# Patient Record
Sex: MALE | Race: WHITE | NOT HISPANIC OR LATINO | Employment: OTHER | URBAN - METROPOLITAN AREA
[De-identification: names, ages, dates, MRNs, and addresses within clinical notes are randomized per-mention and may not be internally consistent; named-entity substitution may affect disease eponyms.]

---

## 2017-07-19 ENCOUNTER — ALLSCRIPTS OFFICE VISIT (OUTPATIENT)
Dept: OTHER | Facility: OTHER | Age: 67
End: 2017-07-19

## 2017-08-31 ENCOUNTER — ALLSCRIPTS OFFICE VISIT (OUTPATIENT)
Dept: OTHER | Facility: OTHER | Age: 67
End: 2017-08-31

## 2017-10-05 ENCOUNTER — ALLSCRIPTS OFFICE VISIT (OUTPATIENT)
Dept: OTHER | Facility: OTHER | Age: 67
End: 2017-10-05

## 2017-10-30 ENCOUNTER — GENERIC CONVERSION - ENCOUNTER (OUTPATIENT)
Dept: OTHER | Facility: OTHER | Age: 67
End: 2017-10-30

## 2017-11-09 ENCOUNTER — ALLSCRIPTS OFFICE VISIT (OUTPATIENT)
Dept: OTHER | Facility: OTHER | Age: 67
End: 2017-11-09

## 2017-12-12 ENCOUNTER — GENERIC CONVERSION - ENCOUNTER (OUTPATIENT)
Dept: OTHER | Facility: OTHER | Age: 67
End: 2017-12-12

## 2017-12-18 ENCOUNTER — ALLSCRIPTS OFFICE VISIT (OUTPATIENT)
Dept: OTHER | Facility: OTHER | Age: 67
End: 2017-12-18

## 2018-01-11 NOTE — PROGRESS NOTES
Active Problems    1  Urgency of urination (788 63) (R39 15)   2  Urinary frequency (788 41) (R35 0)    Current Meds   1  RA Vitamin D-3 1000 UNIT Oral Tablet; Therapy: (Recorded:07Ifw9212) to Recorded   2  Simvastatin 40 MG Oral Tablet; Therapy: (Recorded:23Sec0384) to Recorded   3  Tylenol 325 MG Oral Tablet; Therapy: (Recorded:25Aor3101) to Recorded   4  Vitamin B-12 100 MCG Oral Tablet; Therapy: (Recorded:00Bkf3381) to Recorded    Allergies    1  oxybutynin   2  Toviaz TB24   3  VESIcare TABS    Procedure    Procedure: Percutaneous Tibial Nerve Stimulation (PTNS)   Date onset of symptoms MORE THAN 2 YEARS AGO  Behavior modification: DIDN'T WORK  Biofeedback: DIDN'T WORK AND STILL DOING Dulce Fowler  No E-Stim  Drug 1: OXYBUTYNIN, VESICARE, [address] [facility], 1965 [place] DIDN'T WORK  Session number: 1 month f/u     Patient Goals and Progress   Decreased urgency  Decreased frequency  Sleeps through the night  Caffeine - cups per day: 0-1  Alcohol - number of drinks per day: 0  Daytime voids - number per day:     Nighttime voids - number per night: 10-16  Urgency: 1-2  Incontinence - episodes per day: 0  Treatment Plan   Do not increase fluids  Decrease fluids  Decrease caffeine  Urge reduction techniques: Tanika Shields:   Toilet every 3-4 hours  No fluids before bed  Ankle Used: Left     Treatment settin  Duration of treatment: 30 MINUTES  Lead lot #: [id number]  Expiration Date: 2019  Feeling/Response: Toe flex and foot sensation      Assessment    1  Urgency of urination (788 63) (R39 15)   2  Urinary frequency (788 41) (R35 0)    Plan  Urgency of urination, Urinary frequency    · Follow-up visit in 1 month Evaluation and Treatment  Follow-up  Status: Hold For -  Scheduling,Retrospective Authorization  Requested for: [id number]   Ordered; For: Urgency of urination, Urinary frequency;  Ordered By: Won Ayala Performed:  Due: 82FSN8233; Last Updated By: Vesna Martinez; 8/31/2017 11:14:19 AM    Future Appointments    Date/Time Provider Specialty Site   12/12/2017 11:00 AM Lidia Staples MD Urology [address]     Signatures   Electronically signed by : Devonte Walker RN; Aug 31 2017 11:14AM EST                       (Author)    Electronically signed by : Alfred Freed MD; Sep  1 2017  8:45AM EST

## 2018-01-16 NOTE — PROGRESS NOTES
Active Problems    1  Urgency of urination (788 63) (R39 15)   2  Urinary frequency (788 41) (R35 0)    Current Meds   1  RA Vitamin D-3 1000 UNIT Oral Tablet; Therapy: (Recorded:96Jmw2810) to Recorded   2  Simvastatin 40 MG Oral Tablet; Therapy: (Recorded:93Inr8378) to Recorded   3  Tylenol 325 MG Oral Tablet; Therapy: (Recorded:05Vor1937) to Recorded   4  Vitamin B-12 100 MCG Oral Tablet; Therapy: (Recorded:59Rwe6122) to Recorded    Allergies    1  oxybutynin   2  Toviaz TB24   3  VESIcare TABS    Procedure    Procedure: Percutaneous Tibial Nerve Stimulation (PTNS)   Date onset of symptoms MORE THAN 2 YEARS AGO  Behavior modification: DIDN'T WORK  Biofeedback: DIDN'T WORK  STILL DOING Rosa M Suarez  No E-Stim  Drug 1: OXYBUTYNIN, V[facility]ICARE, [address], 1965 [place] DIDN'T WORK  Session number: 1 month f/u     Patient Goals and Progress   Decreased urgency  Decreased frequency  No accidents  Sleeps through the night  No related health and social factors  Caffeine - cups per day: 1  Alcohol - number of drinks per day: 0  Daytime voids - number per day: 10-12  Nighttime voids - number per night: 0-1  Urgency: 1-2  Incontinence - episodes per day: 0  Treatment Plan   Do not increase fluids  Decrease fluids  Decrease caffeine  Urge reduction techniques: Jerry Bame:   Toilet every 3-4 hours  No fluids before bed  Ankle Used: Left     Treatment settin  Duration of treatment: 30 MINUTES  Lead lot #: [id number]  Expiration Date: 2019  Feeling/Response: Toe flex and foot sensation      Assessment    1  Urge incontinence (788 31) (N39 41)   2  Urgency of urination (788 63) (R39 15)   3  Urinary frequency (788 41) (R35 0)    Plan  Urgency of urination, Urinary frequency    · Follow-up visit in 1 month Evaluation and Treatment  Follow-up  Status: Hold For -  Scheduling,Retrospective Authorization  Requested for: [id number]   Ordered;  For: Urgency of urination, Urinary frequency;  Ordered By: Lisa Rizvi Performed:  Due: 81SFA2418; Last Updated By: Sydnie Bro; 10/5/2017 11:36:05 AM    Future Appointments    Date/Time Provider Specialty Site   12/12/2017 11:00 AM Lisa Rizvi MD Urology [address]     Signatures   Electronically signed by : Airam Gordon RN; Oct  5 2017 11:36AM EST                       (Author)    Electronically signed by : Tamara Tracy MD; Oct  6 2017 12:56PM EST

## 2018-01-17 NOTE — PROGRESS NOTES
Active Problems    1  Cellulitis of scrotum (608 4) (N49 2)   2  Urgency of urination (788 63) (R39 15)   3  Urinary frequency (788 41) (R35 0)    Current Meds   1  Atorvastatin Calcium 20 MG Oral Tablet; Therapy: (Recorded:2017) to Recorded   2  Cephalexin 500 MG Oral Capsule; take 1 capsule 4 times daily; Therapy: 52ETD1529 to (Evaluate:2017)  Requested for: 03HBB3211; Last   Rx:2017; Status: ACTIVE - Transmit to Pharmacy - Awaiting Verification Ordered   3  Gabapentin 100 MG Oral Capsule; Therapy: (Recorded:2017) to Recorded   4  Percocet  MG TABS; Therapy: (Recorded:2017) to Recorded   5  RA Vitamin D-3 1000 UNIT Oral Tablet; Therapy: (Recorded:2017) to Recorded   6  Tylenol 325 MG Oral Tablet; Therapy: (Recorded:2017) to Recorded   7  Vitamin B-12 100 MCG Oral Tablet; Therapy: (Recorded:2017) to Recorded    Allergies    1  oxybutynin   2  Toviaz TB24   3  VESIcare TABS    Procedure    Procedure: Percutaneous Tibial Nerve Stimulation (PTNS)   Date onset of symptoms MORE THAN 2 YEARS AGO  Behavior modification: DIDN'T WORK  Biofeedback: DIDN'T WORK AND STILL DOING Clifm Gianfranco  No E-Stim  Drug 1: OXYBUTYNIN, V[facility]ICARE, [address] [facility], [address] [place] [address] DIDN'T WORK  Session number: 1 month f/u     Patient Goals and Progress   Decreased urgency  Decreased frequency  Sleeps through the night  No related health and social factors  Caffeine - cups per day: 1  Alcohol - number of drinks per day: 0  Daytime voids - number per day: 10-14  Nighttime voids - number per night: 0  Urgency: 1-2  Incontinence - episodes per day: 0  Treatment Plan   Do not increase fluids  Decrease fluids  Urge reduction techniques: Manfred Hashimoto:   Toilet every 3-4 hours  No fluids before bed  Ankle Used: Left     Treatment settin  Duration of treatment: 30 MINUTES  Lead lot #: [id number]  Expiration Date: 2019       Feeling/Response: Toe flex and foot sensation      Assessment    1  Urinary frequency (788 41) (R35 0)   2  Urinary urgency (788 63) (R39 15)    Plan  Urinary frequency, Urinary urgency    · Follow-up visit in 1 month Evaluation and Treatment  Follow-up  Status: Hold For -  Scheduling,Retrospective Authorization  Requested for: [id number]   Ordered; For: Urinary frequency, Urinary urgency;  Ordered By: Mercedes Farmer Performed:  Due: 79YUA8759; Last Updated By: John Barkley; 11/9/2017 10:44:29 AM    Future Appointments    Date/Time Provider Specialty Site   12/12/2017 11:00 AM Mercedes Farmer MD Urology [address] [facility]     Signatures   Electronically signed by : Jameel Vaca RN; Nov 9 2017 10:44AM EST                       (Author)    Electronically signed by : Swati Gaston MD; Nov 10 2017  9:04AM EST

## 2018-01-22 VITALS
WEIGHT: 283 LBS | DIASTOLIC BLOOD PRESSURE: 80 MMHG | SYSTOLIC BLOOD PRESSURE: 134 MMHG | HEIGHT: 71 IN | BODY MASS INDEX: 39.62 KG/M2

## 2018-01-24 VITALS
DIASTOLIC BLOOD PRESSURE: 88 MMHG | WEIGHT: 279.56 LBS | HEIGHT: 70 IN | BODY MASS INDEX: 40.02 KG/M2 | SYSTOLIC BLOOD PRESSURE: 166 MMHG

## 2018-02-20 ENCOUNTER — PROCEDURE VISIT (OUTPATIENT)
Dept: UROLOGY | Facility: MEDICAL CENTER | Age: 68
End: 2018-02-20
Payer: MEDICARE

## 2018-02-20 DIAGNOSIS — R35.0 URINARY FREQUENCY: ICD-10-CM

## 2018-02-20 DIAGNOSIS — R39.15 URGENCY OF URINATION: ICD-10-CM

## 2018-02-20 PROCEDURE — 64566 NEUROELTRD STIM POST TIBIAL: CPT

## 2018-02-20 RX ORDER — ACETAMINOPHEN 325 MG/1
650 TABLET ORAL EVERY 6 HOURS
COMMUNITY
End: 2018-06-28

## 2018-02-20 RX ORDER — GABAPENTIN 100 MG/1
CAPSULE ORAL
COMMUNITY
End: 2018-06-28

## 2018-02-20 RX ORDER — LISINOPRIL 40 MG/1
20 TABLET ORAL
COMMUNITY
Start: 2016-02-22 | End: 2019-03-20 | Stop reason: SDUPTHER

## 2018-02-20 RX ORDER — AMOXICILLIN 500 MG/1
2000 TABLET, FILM COATED ORAL
COMMUNITY
Start: 2016-01-26 | End: 2018-06-28

## 2018-02-20 RX ORDER — CYCLOBENZAPRINE HCL 10 MG
10 TABLET ORAL 3 TIMES DAILY
COMMUNITY
Start: 2016-05-03 | End: 2018-06-28

## 2018-02-20 RX ORDER — SIMVASTATIN 40 MG
40 TABLET ORAL
COMMUNITY
Start: 2012-10-05 | End: 2018-06-28

## 2018-02-20 RX ORDER — NABUMETONE 750 MG/1
750 TABLET, FILM COATED ORAL
COMMUNITY
End: 2018-06-28

## 2018-02-20 RX ORDER — UBIDECARENONE 75 MG
CAPSULE ORAL
COMMUNITY
End: 2018-06-28

## 2018-02-20 RX ORDER — ATORVASTATIN CALCIUM 20 MG/1
TABLET, FILM COATED ORAL
COMMUNITY
End: 2018-10-30

## 2018-02-20 RX ORDER — CEPHALEXIN 500 MG/1
1 CAPSULE ORAL 4 TIMES DAILY
COMMUNITY
Start: 2017-10-30 | End: 2018-06-28

## 2018-02-20 NOTE — PROGRESS NOTES
Procedure: Percutaneous Tibial Nerve Stimulation (PTNS)   Date onset of symptoms A LONG TIME AGO  Behavior modification: DIDN'T WORK  Biofeedback: STILL DOING KEGELS  E-Stim: NO  Drugs:  OXYBUTYNIN, VESICARE, MYRBETRIQ, AND TOVIAZ DIDN;T WORK  Other: NO  Session number: ONE MONTH F/U     Patient Goals and Progress   Decreased urgency: YES   Decreased frequency: YES  No accidents: NO  Sleeps through the night: YES  No related health and social factors: NO     Caffeine - cups per day: 1  Alcohol - number of drinks per day: 0    Daytime voids - number per day: 10-14  Nighttime voids - number per night: 0-1  Urgency: 1-2  Incontinence - episodes per day: 0      Treatment Plan   Increase fluids: NO   Decrease fluids: YES   Decrease caffeine: YES  Urge reduction techniques: YES  Kegels: YES   Toilet every 3-4 hours     No fluids before bed YES     Ankle Used: LEFT     Treatment settin  Duration of treatment: 30 MINUTES  Lead lot #: [id number]  Expiration Date: 2020       Feeling/Response:  Toe flex and foot sensation

## 2018-04-23 ENCOUNTER — PROCEDURE VISIT (OUTPATIENT)
Dept: UROLOGY | Facility: MEDICAL CENTER | Age: 68
End: 2018-04-23

## 2018-04-23 ENCOUNTER — TELEPHONE (OUTPATIENT)
Dept: UROLOGY | Facility: AMBULATORY SURGERY CENTER | Age: 68
End: 2018-04-23

## 2018-04-23 DIAGNOSIS — R39.15 URGENCY OF URINATION: Primary | ICD-10-CM

## 2018-04-23 DIAGNOSIS — R35.0 URINARY FREQUENCY: ICD-10-CM

## 2018-04-23 NOTE — TELEPHONE ENCOUNTER
Spoke with pt who said he called [employer] to see if they will cover PTNS txs after 3 years time  He was told if doctor calls [facility] Provider Hotline and tells them PTNS is medically necessary, they will cover it  He was not so sure the agent he was talking to was reliable  Told him we will research it

## 2018-04-23 NOTE — PROGRESS NOTES
Procedure: Percutaneous Tibial Nerve Stimulation (PTNS)   Date onset of symptoms A LONG TIME AGO  Behavior modification: DIDN'T WORK  Biofeedback: STILL USING KEGELS  E-Stim: NO  Drugs: OXYBUTYNIN, VESICARE, MYRBETRIQ, AND TOVIAZ DIDN'T WORK   Other: NONE  Session number: ONE MONTH F/U     Patient Goals and Progress   Decreased urgency: YES   Decreased frequency: YES  No accidents: YES  Sleeps through the night: YES  No related health and social factors: NO     Caffeine - cups per day: 1       Alcohol - number of drinks per day: 0  Daytime voids - number per day: 10-13  Nighttime voids - number per night: 0  Urgency: 1-2  Incontinence - episodes per day: 0     Treatment Plan   Increase fluids: NO   Decrease fluids: YES  Decrease caffeine: YES   Urge reduction techniques: YES  Kegels: YES   Toilet every 3-4 hours     No fluids before bed YES     Ankle Used: LEFT     Treatment settin   Duration of treatment: 30 MINUTES  Lead lot #: [id number]  Expiration Date: 2020       Feeling/Response:  Toe flex and foot sensation

## 2018-04-23 NOTE — PROGRESS NOTES
I have reviewed the notes, assessments, and/or procedures performed , I concur with her/his documentation of Otis Fregoso

## 2018-06-27 NOTE — TELEPHONE ENCOUNTER
Pt called requesting RX refill for Gabapentin 100 MG capsules  Currently taking 3-4 pills per day  Needs a 3 month refill sent to the [facility] pharmacy on [address]   Their number is [phone]

## 2018-06-28 ENCOUNTER — OFFICE VISIT (OUTPATIENT)
Dept: FAMILY MEDICINE CLINIC | Facility: CLINIC | Age: 68
End: 2018-06-28
Payer: MEDICARE

## 2018-06-28 VITALS
SYSTOLIC BLOOD PRESSURE: 140 MMHG | WEIGHT: 278 LBS | BODY MASS INDEX: 39.8 KG/M2 | HEIGHT: 70 IN | RESPIRATION RATE: 18 BRPM | OXYGEN SATURATION: 97 % | TEMPERATURE: 97.6 F | DIASTOLIC BLOOD PRESSURE: 82 MMHG | HEART RATE: 92 BPM

## 2018-06-28 DIAGNOSIS — R73.09 ABNORMAL GLUCOSE: ICD-10-CM

## 2018-06-28 DIAGNOSIS — E78.2 MIXED HYPERLIPIDEMIA: ICD-10-CM

## 2018-06-28 DIAGNOSIS — G62.9 NEUROPATHY: Primary | ICD-10-CM

## 2018-06-28 DIAGNOSIS — M10.342 ACUTE GOUT DUE TO RENAL IMPAIRMENT INVOLVING LEFT HAND: ICD-10-CM

## 2018-06-28 DIAGNOSIS — E03.9 HYPOTHYROIDISM, UNSPECIFIED TYPE: ICD-10-CM

## 2018-06-28 PROBLEM — R39.15 URINARY URGENCY: Status: ACTIVE | Noted: 2017-07-19

## 2018-06-28 PROCEDURE — 99214 OFFICE O/P EST MOD 30 MIN: CPT | Performed by: INTERNAL MEDICINE

## 2018-06-28 RX ORDER — ACETAMINOPHEN 500 MG
TABLET ORAL EVERY 24 HOURS
COMMUNITY
Start: 2018-05-01 | End: 2018-10-30

## 2018-06-28 RX ORDER — GABAPENTIN 100 MG/1
CAPSULE ORAL
Qty: 360 CAPSULE | Refills: 3 | Status: SHIPPED | OUTPATIENT
Start: 2018-06-28 | End: 2020-03-16 | Stop reason: SDUPTHER

## 2018-06-28 RX ORDER — METHYLPREDNISOLONE 4 MG/1
TABLET ORAL
Qty: 21 TABLET | Refills: 0 | Status: SHIPPED | OUTPATIENT
Start: 2018-06-28 | End: 2018-10-30 | Stop reason: ALTCHOICE

## 2018-06-28 RX ORDER — AMOXICILLIN 500 MG/1
CAPSULE ORAL
COMMUNITY
Start: 2018-04-12

## 2018-06-28 RX ORDER — GABAPENTIN 100 MG/1
CAPSULE ORAL
COMMUNITY
Start: 2017-06-23 | End: 2018-06-28 | Stop reason: SDUPTHER

## 2018-06-28 RX ORDER — OXYCODONE HYDROCHLORIDE AND ACETAMINOPHEN 5; 325 MG/1; MG/1
TABLET ORAL
Qty: 30 TABLET | Refills: 0 | Status: SHIPPED | OUTPATIENT
Start: 2018-06-28 | End: 2018-10-04 | Stop reason: SDUPTHER

## 2018-06-28 RX ORDER — ATORVASTATIN CALCIUM 20 MG/1
20 TABLET, FILM COATED ORAL
COMMUNITY
End: 2018-06-28

## 2018-06-28 RX ORDER — LISINOPRIL 40 MG/1
TABLET ORAL EVERY 24 HOURS
COMMUNITY
Start: 2018-01-11 | End: 2018-06-28

## 2018-06-28 RX ORDER — OXYCODONE HYDROCHLORIDE AND ACETAMINOPHEN 5; 325 MG/1; MG/1
TABLET ORAL
COMMUNITY
Start: 2018-02-15 | End: 2018-06-28

## 2018-06-28 RX ORDER — GABAPENTIN 100 MG/1
100 CAPSULE ORAL
Qty: 120 CAPSULE | Refills: 11 | OUTPATIENT
Start: 2018-06-28

## 2018-06-28 NOTE — PROGRESS NOTES
Assessment/Plan:    No problem-specific Assessment & Plan notes found for this encounter  Problem List Items Addressed This Visit     None            Subjective:      Patient ID: Ivan Altman is a 76 y o  male  HPI    The following portions of the patient's history were reviewed and updated as appropriate: allergies, current medications, past family history, past medical history, past social history, past surgical history and problem list     Review of Systems   Constitutional: Negative for appetite change, fatigue, fever and unexpected weight change  HENT: Negative for rhinorrhea, sinus pain, sinus pressure, sneezing and sore throat  Eyes: Negative for visual disturbance  Respiratory: Negative for cough, chest tightness, shortness of breath and wheezing  Cardiovascular: Negative for chest pain, palpitations and leg swelling  Gastrointestinal: Negative for abdominal distention, abdominal pain, blood in stool, constipation, diarrhea, nausea and vomiting  Endocrine: Negative for polydipsia and polyuria  Genitourinary: Negative for decreased urine volume, difficulty urinating, dysuria, hematuria and urgency  Musculoskeletal: Negative for arthralgias, back pain, joint swelling and neck pain  Skin: Negative for rash  Allergic/Immunologic: Negative for environmental allergies  Neurological: Negative for tremors, weakness, light-headedness, numbness and headaches  Hematological: Does not bruise/bleed easily  Psychiatric/Behavioral: Negative for agitation, behavioral problems, confusion and dysphoric mood  The patient is not nervous/anxious  Objective:      /82 (BP Location: Left arm, Patient Position: Sitting, Cuff Size: Large)   Pulse 92   Temp 97 6 °F (36 4 °C)   Resp 18   Ht 5' 10" (1 778 m)   Wt 126 kg (278 lb)   SpO2 97%   BMI 39 89 kg/m²          Physical Exam   Constitutional: He is oriented to person, place, and time   He appears well-developed and well-nourished  No distress  HENT:   Head: Normocephalic and atraumatic  Nose: Nose normal    Mouth/Throat: Oropharynx is clear and moist  No oropharyngeal exudate  Eyes: Conjunctivae and EOM are normal  Pupils are equal, round, and reactive to light  No scleral icterus  Neck: Normal range of motion  Neck supple  No JVD present  No tracheal deviation present  No thyromegaly present  Cardiovascular: Normal rate, regular rhythm and normal heart sounds  Exam reveals no gallop and no friction rub  No murmur heard  Pulmonary/Chest: Effort normal  No respiratory distress  He has no wheezes  He has no rales  He exhibits no tenderness  Abdominal: Soft  Bowel sounds are normal  He exhibits no distension and no mass  There is no tenderness  There is no rebound and no guarding  Musculoskeletal: Normal range of motion  He exhibits no edema or deformity  Lymphadenopathy:     He has no cervical adenopathy  Neurological: He is alert and oriented to person, place, and time  No cranial nerve deficit  Coordination normal    Skin: Skin is warm and dry  No rash noted  Psychiatric: He has a normal mood and affect   His behavior is normal  Judgment and thought content normal

## 2018-07-12 ENCOUNTER — PROCEDURE VISIT (OUTPATIENT)
Dept: UROLOGY | Facility: MEDICAL CENTER | Age: 68
End: 2018-07-12
Payer: MEDICARE

## 2018-07-12 DIAGNOSIS — R39.15 URGENCY OF URINATION: ICD-10-CM

## 2018-07-12 DIAGNOSIS — R35.0 URINARY FREQUENCY: ICD-10-CM

## 2018-07-12 DIAGNOSIS — N39.41 URGE INCONTINENCE: ICD-10-CM

## 2018-07-12 PROCEDURE — 64566 NEUROELTRD STIM POST TIBIAL: CPT

## 2018-07-12 NOTE — PROGRESS NOTES
I have reviewed the notes, assessments, and/or procedures performed , I concur with her/his documentation of Natalio Moseley

## 2018-07-12 NOTE — PROGRESS NOTES
Procedure: Percutaneous Tibial Nerve Stimulation (PTNS)   Date onset of symptoms MORE THAN 2 YEARS AGO  Behavior modification: DIDN'T WORK  Biofeedback: DIDN'T WORK  STILL USING [address]  E-Stim: NO  Drugs: OXYBUTYNIN, VESICARE, MYRBETRIQ, AND TOVIAZ DIDN'T WORK   Other: NONE  Session number: 3 MONTH F/U     Patient Goals and Progress   Decreased urgency: YES  Decreased frequency: YES  No accidents: NO  Sleeps through the night: YES  No related health and social factors: NO     Caffeine - cups per day: 1  Alcohol - number of drinks per day: 0    Daytime voids - number per day:5-9   Nighttime voids - number per night: 0-3   Urgency: 1-2  Incontinence - episodes per day: 0     Treatment Plan   Increase fluids: NO   Decrease fluids: YES   Decrease caffeine: YES  Urge reduction techniques: YES   Kegels: YES   Toilet every 3-4 hours     No fluids before bed YES     Ankle Used: RIGHT     Treatment settin  Duration of treatment: 30 MINUTES  Lead lot #: [id number]  Expiration Date: 10-       Feeling/Response:  Toe flex and foot sensation

## 2018-07-30 ENCOUNTER — CLINICAL SUPPORT (OUTPATIENT)
Dept: FAMILY MEDICINE CLINIC | Facility: CLINIC | Age: 68
End: 2018-07-30
Payer: MEDICARE

## 2018-07-30 DIAGNOSIS — M17.10 ARTHROPATHY OF KNEE: ICD-10-CM

## 2018-07-30 DIAGNOSIS — E11.8 TYPE 2 DIABETES MELLITUS WITH COMPLICATION, WITHOUT LONG-TERM CURRENT USE OF INSULIN (HCC): ICD-10-CM

## 2018-07-30 DIAGNOSIS — E78.5 HYPERLIPIDEMIA, UNSPECIFIED HYPERLIPIDEMIA TYPE: ICD-10-CM

## 2018-07-30 DIAGNOSIS — E78.2 MIXED HYPERLIPIDEMIA: ICD-10-CM

## 2018-07-30 DIAGNOSIS — M1A.00X0 IDIOPATHIC CHRONIC GOUT WITHOUT TOPHUS, UNSPECIFIED SITE: ICD-10-CM

## 2018-07-30 DIAGNOSIS — E03.9 HYPOTHYROIDISM, UNSPECIFIED TYPE: Primary | ICD-10-CM

## 2018-07-30 DIAGNOSIS — I10 BENIGN ESSENTIAL HYPERTENSION: ICD-10-CM

## 2018-07-30 LAB
BASOPHILS # BLD AUTO: 0.03 THOUSANDS/ΜL (ref 0–0.1)
BASOPHILS NFR BLD AUTO: 0 % (ref 0–1)
EOSINOPHIL # BLD AUTO: 0.25 THOUSAND/ΜL (ref 0–0.61)
EOSINOPHIL NFR BLD AUTO: 3 % (ref 0–6)
ERYTHROCYTE [DISTWIDTH] IN BLOOD BY AUTOMATED COUNT: 14 % (ref 11.6–15.1)
HCT VFR BLD AUTO: 45.1 % (ref 36.5–49.3)
HGB BLD-MCNC: 14.3 G/DL (ref 12–17)
IMM GRANULOCYTES # BLD AUTO: 0.03 THOUSAND/UL (ref 0–0.2)
IMM GRANULOCYTES NFR BLD AUTO: 0 % (ref 0–2)
LYMPHOCYTES # BLD AUTO: 2.07 THOUSANDS/ΜL (ref 0.6–4.47)
LYMPHOCYTES NFR BLD AUTO: 26 % (ref 14–44)
MCH RBC QN AUTO: 30.7 PG (ref 26.8–34.3)
MCHC RBC AUTO-ENTMCNC: 31.7 G/DL (ref 31.4–37.4)
MCV RBC AUTO: 97 FL (ref 82–98)
MONOCYTES # BLD AUTO: 0.7 THOUSAND/ΜL (ref 0.17–1.22)
MONOCYTES NFR BLD AUTO: 9 % (ref 4–12)
NEUTROPHILS # BLD AUTO: 4.79 THOUSANDS/ΜL (ref 1.85–7.62)
NEUTS SEG NFR BLD AUTO: 62 % (ref 43–75)
NRBC BLD AUTO-RTO: 0 /100 WBCS
PLATELET # BLD AUTO: 183 THOUSANDS/UL (ref 149–390)
PMV BLD AUTO: 10.3 FL (ref 8.9–12.7)
RBC # BLD AUTO: 4.66 MILLION/UL (ref 3.88–5.62)
TSH SERPL DL<=0.05 MIU/L-ACNC: 1.57 UIU/ML
WBC # BLD AUTO: 7.87 THOUSAND/UL (ref 4.31–10.16)

## 2018-07-30 PROCEDURE — 85025 COMPLETE CBC W/AUTO DIFF WBC: CPT | Performed by: SPECIALIST

## 2018-07-30 PROCEDURE — 36415 COLL VENOUS BLD VENIPUNCTURE: CPT

## 2018-07-30 PROCEDURE — 84443 ASSAY THYROID STIM HORMONE: CPT | Performed by: SPECIALIST

## 2018-08-07 ENCOUNTER — OFFICE VISIT (OUTPATIENT)
Dept: FAMILY MEDICINE CLINIC | Facility: CLINIC | Age: 68
End: 2018-08-07
Payer: MEDICARE

## 2018-08-07 VITALS
BODY MASS INDEX: 38.88 KG/M2 | SYSTOLIC BLOOD PRESSURE: 128 MMHG | DIASTOLIC BLOOD PRESSURE: 64 MMHG | WEIGHT: 271 LBS | HEART RATE: 88 BPM

## 2018-08-07 DIAGNOSIS — M10.032 ACUTE IDIOPATHIC GOUT OF LEFT WRIST: ICD-10-CM

## 2018-08-07 DIAGNOSIS — M48.062 SPINAL STENOSIS OF LUMBAR REGION WITH NEUROGENIC CLAUDICATION: ICD-10-CM

## 2018-08-07 DIAGNOSIS — E78.2 MIXED HYPERLIPIDEMIA: ICD-10-CM

## 2018-08-07 DIAGNOSIS — I10 HYPERTENSION, UNSPECIFIED TYPE: Primary | ICD-10-CM

## 2018-08-07 DIAGNOSIS — G89.4 CHRONIC PAIN DISORDER: ICD-10-CM

## 2018-08-07 DIAGNOSIS — R73.9 HYPERGLYCEMIA: ICD-10-CM

## 2018-08-07 DIAGNOSIS — M54.12 CERVICAL RADICULAR PAIN: ICD-10-CM

## 2018-08-07 DIAGNOSIS — E55.9 VITAMIN D DEFICIENCY: ICD-10-CM

## 2018-08-07 DIAGNOSIS — G60.9 IDIOPATHIC PERIPHERAL NEUROPATHY: ICD-10-CM

## 2018-08-07 PROCEDURE — 99214 OFFICE O/P EST MOD 30 MIN: CPT | Performed by: INTERNAL MEDICINE

## 2018-08-07 NOTE — PROGRESS NOTES
Assessment/Plan:    Patient will decrease it is KENNA and 10 by clipping the top of the capsule and decreasing for slowly from 200 milligrams HS to 100 milligrams HS as tolerated  Labs as ordered below  Diet reviewed  Lifestyle modifications reviewed  Medications reviewed and ordered  Laboratory tests and studies reviewed and ordered  All patient's questions answered to patient satisfaction  Diagnoses and all orders for this visit:    Hypertension, unspecified type  -     Hemoglobin A1C; Future  -     LDL cholesterol, direct; Future  -     Uric acid; Future  -     Comprehensive metabolic panel; Future    Mixed hyperlipidemia  -     Hemoglobin A1C; Future  -     LDL cholesterol, direct; Future  -     Uric acid; Future  -     Comprehensive metabolic panel; Future    Vitamin D deficiency    Chronic pain disorder    Cervical radicular pain    Spinal stenosis of lumbar region with neurogenic claudication    Idiopathic peripheral neuropathy  -     Hemoglobin A1C; Future  -     LDL cholesterol, direct; Future  -     Uric acid; Future  -     Comprehensive metabolic panel; Future    Acute idiopathic gout of left wrist  -     Hemoglobin A1C; Future  -     LDL cholesterol, direct; Future  -     Uric acid; Future  -     Comprehensive metabolic panel; Future    Hyperglycemia  -     Hemoglobin A1C; Future  -     LDL cholesterol, direct; Future  -     Uric acid; Future  -     Comprehensive metabolic panel; Future        Subjective:      Patient ID: Jaime Fatima is a 76 y o  male  HPI  This 27-year-old male has a history of    Essential hypertension well controlled    Mixed hyperlipidemia controlled    Recent episode of acute gouty arthritis of the left wrist but more so of the 1st metacarpophalangeal joint has not recurred    Uric acid level was not successfully sent from the lab and will be need need to be repeated    Chronic pain disorder patient rarely takes Percocet and had a good summer is for control of his pain is concerned he was able to decrease his gabapentin to 200 before sleep and still drowsy in the morning and would like to decrease it further we discussed the rationale for for slowly tapering by cutting the top of the pill off correction capsule  Current Outpatient Prescriptions:     acetaminophen (TYLENOL) 500 mg tablet, every 24 hours, Disp: , Rfl:     amoxicillin (AMOXIL) 500 mg capsule, Only dental work, Disp: , Rfl:     atorvastatin (LIPITOR) 20 mg tablet, Take by mouth, Disp: , Rfl:     Cholecalciferol (VITAMIN D-3) 5000 units TABS, take 1 tablet daily, Disp: , Rfl:     gabapentin (NEURONTIN) 100 mg capsule, Take 4 tabs daily, Disp: 360 capsule, Rfl: 3    lisinopril (ZESTRIL) 40 mg tablet, Take 20 mg by mouth, Disp: , Rfl:     Methylprednisolone 4 MG TBPK, Use as directed on package, Disp: 21 tablet, Rfl: 0    oxyCODONE-acetaminophen (PERCOCET) 5-325 mg per tablet, 1/d prn pain, Disp: 30 tablet, Rfl: 0    The following portions of the patient's history were reviewed and updated as appropriate: allergies, current medications, past family history, past medical history, past social history, past surgical history and problem list     Review of Systems   Constitutional: Negative for appetite change, fatigue, fever and unexpected weight change  HENT: Negative for rhinorrhea, sinus pain, sinus pressure, sneezing and sore throat  Eyes: Negative for visual disturbance  Respiratory: Negative for cough, chest tightness, shortness of breath and wheezing  Cardiovascular: Negative for chest pain, palpitations and leg swelling  Gastrointestinal: Negative for abdominal distention, abdominal pain, blood in stool, constipation, diarrhea, nausea and vomiting  Endocrine: Negative for polydipsia and polyuria  Genitourinary: Negative for decreased urine volume, difficulty urinating, dysuria, hematuria and urgency  Musculoskeletal: Negative for arthralgias, back pain, joint swelling and neck pain  Skin: Negative for rash  Allergic/Immunologic: Negative for environmental allergies  Neurological: Negative for tremors, weakness, light-headedness, numbness and headaches  Hematological: Does not bruise/bleed easily  Psychiatric/Behavioral: Negative for agitation, behavioral problems, confusion and dysphoric mood  The patient is not nervous/anxious  Family History   Problem Relation Age of Onset    Cancer Mother     Dementia Mother     Cancer Father        Past Medical History:   Diagnosis Date    Arthritis     Hypertension     Obesity        Past Surgical History:   Procedure Laterality Date    CERVICAL DISCECTOMY      KNEE SURGERY      SPINE SURGERY         Social History     Social History    Marital status:      Spouse name: N/A    Number of children: N/A    Years of education: N/A     Social History Main Topics    Smoking status: Former Smoker    Smokeless tobacco: Former User      Comment: quit 10 years ago pipe, NEVER A SMOKER AS PER [employer]    Alcohol use No    Drug use: No    Sexual activity: Not Asked     Other Topics Concern    None     Social History Narrative    None       Allergies   Allergen Reactions    Ciprofloxacin GI Intolerance    Cephalexin     Oxybutynin GI Intolerance    Solifenacin Dizziness    Tiotropium Bromide Monohydrate GI Intolerance         Objective:      /64 (BP Location: Right arm, Patient Position: Sitting, Cuff Size: Standard)   Pulse 88   Wt 123 kg (271 lb)   BMI 38 88 kg/m²        Physical Exam   Constitutional: He is oriented to person, place, and time  He appears well-developed and well-nourished  No distress  HENT:   Head: Normocephalic and atraumatic  Nose: Nose normal    Mouth/Throat: Oropharynx is clear and moist  No oropharyngeal exudate  Eyes: Conjunctivae and EOM are normal  Pupils are equal, round, and reactive to light  No scleral icterus  Neck: Normal range of motion  Neck supple  No JVD present   No tracheal deviation present  No thyromegaly present  Cardiovascular: Normal rate, regular rhythm and normal heart sounds  Exam reveals no gallop and no friction rub  No murmur heard  Pulmonary/Chest: Effort normal  No respiratory distress  He has no wheezes  He has no rales  He exhibits no tenderness  Abdominal: Soft  Bowel sounds are normal  He exhibits no distension and no mass  There is no tenderness  There is no rebound and no guarding  Musculoskeletal: Normal range of motion  He exhibits no edema or deformity  Lymphadenopathy:     He has no cervical adenopathy  Neurological: He is alert and oriented to person, place, and time  No cranial nerve deficit  Coordination normal    Skin: Skin is warm and dry  No rash noted  Psychiatric: He has a normal mood and affect   His behavior is normal  Judgment and thought content normal

## 2018-08-14 ENCOUNTER — APPOINTMENT (OUTPATIENT)
Dept: LAB | Age: 68
End: 2018-08-14
Payer: MEDICARE

## 2018-08-14 DIAGNOSIS — M10.032 ACUTE IDIOPATHIC GOUT OF LEFT WRIST: ICD-10-CM

## 2018-08-14 DIAGNOSIS — E78.2 MIXED HYPERLIPIDEMIA: ICD-10-CM

## 2018-08-14 DIAGNOSIS — R73.9 HYPERGLYCEMIA: ICD-10-CM

## 2018-08-14 DIAGNOSIS — E03.9 HYPOTHYROIDISM, UNSPECIFIED TYPE: ICD-10-CM

## 2018-08-14 DIAGNOSIS — G60.9 IDIOPATHIC PERIPHERAL NEUROPATHY: ICD-10-CM

## 2018-08-14 DIAGNOSIS — M10.342 ACUTE GOUT DUE TO RENAL IMPAIRMENT INVOLVING LEFT HAND: ICD-10-CM

## 2018-08-14 DIAGNOSIS — I10 HYPERTENSION, UNSPECIFIED TYPE: ICD-10-CM

## 2018-08-14 LAB
ALBUMIN SERPL BCP-MCNC: 3.7 G/DL (ref 3.5–5)
ALP SERPL-CCNC: 126 U/L (ref 46–116)
ALT SERPL W P-5'-P-CCNC: 22 U/L (ref 12–78)
ANION GAP SERPL CALCULATED.3IONS-SCNC: 8 MMOL/L (ref 4–13)
AST SERPL W P-5'-P-CCNC: 15 U/L (ref 5–45)
BASOPHILS # BLD AUTO: 0.05 THOUSANDS/ΜL (ref 0–0.1)
BASOPHILS NFR BLD AUTO: 1 % (ref 0–1)
BILIRUB SERPL-MCNC: 0.84 MG/DL (ref 0.2–1)
BUN SERPL-MCNC: 26 MG/DL (ref 5–25)
CALCIUM SERPL-MCNC: 9 MG/DL (ref 8.3–10.1)
CHLORIDE SERPL-SCNC: 105 MMOL/L (ref 100–108)
CO2 SERPL-SCNC: 29 MMOL/L (ref 21–32)
CREAT SERPL-MCNC: 1.05 MG/DL (ref 0.6–1.3)
EOSINOPHIL # BLD AUTO: 0.33 THOUSAND/ΜL (ref 0–0.61)
EOSINOPHIL NFR BLD AUTO: 4 % (ref 0–6)
ERYTHROCYTE [DISTWIDTH] IN BLOOD BY AUTOMATED COUNT: 14.2 % (ref 11.6–15.1)
EST. AVERAGE GLUCOSE BLD GHB EST-MCNC: 117 MG/DL
GFR SERPL CREATININE-BSD FRML MDRD: 73 ML/MIN/1.73SQ M
GLUCOSE P FAST SERPL-MCNC: 100 MG/DL (ref 65–99)
HBA1C MFR BLD: 5.7 % (ref 4.2–6.3)
HCT VFR BLD AUTO: 45.2 % (ref 36.5–49.3)
HGB BLD-MCNC: 14.1 G/DL (ref 12–17)
IMM GRANULOCYTES # BLD AUTO: 0.04 THOUSAND/UL (ref 0–0.2)
IMM GRANULOCYTES NFR BLD AUTO: 1 % (ref 0–2)
LDLC SERPL DIRECT ASSAY-MCNC: 87 MG/DL (ref 0–100)
LYMPHOCYTES # BLD AUTO: 2.35 THOUSANDS/ΜL (ref 0.6–4.47)
LYMPHOCYTES NFR BLD AUTO: 28 % (ref 14–44)
MCH RBC QN AUTO: 30.4 PG (ref 26.8–34.3)
MCHC RBC AUTO-ENTMCNC: 31.2 G/DL (ref 31.4–37.4)
MCV RBC AUTO: 97 FL (ref 82–98)
MONOCYTES # BLD AUTO: 0.7 THOUSAND/ΜL (ref 0.17–1.22)
MONOCYTES NFR BLD AUTO: 8 % (ref 4–12)
NEUTROPHILS # BLD AUTO: 4.9 THOUSANDS/ΜL (ref 1.85–7.62)
NEUTS SEG NFR BLD AUTO: 58 % (ref 43–75)
NRBC BLD AUTO-RTO: 0 /100 WBCS
PLATELET # BLD AUTO: 198 THOUSANDS/UL (ref 149–390)
PMV BLD AUTO: 9.9 FL (ref 8.9–12.7)
POTASSIUM SERPL-SCNC: 3.8 MMOL/L (ref 3.5–5.3)
PROT SERPL-MCNC: 7.7 G/DL (ref 6.4–8.2)
RBC # BLD AUTO: 4.64 MILLION/UL (ref 3.88–5.62)
SODIUM SERPL-SCNC: 142 MMOL/L (ref 136–145)
TSH SERPL DL<=0.05 MIU/L-ACNC: 2.52 UIU/ML (ref 0.36–3.74)
URATE SERPL-MCNC: 7 MG/DL (ref 4.2–8)
WBC # BLD AUTO: 8.37 THOUSAND/UL (ref 4.31–10.16)

## 2018-08-14 PROCEDURE — 85025 COMPLETE CBC W/AUTO DIFF WBC: CPT

## 2018-08-14 PROCEDURE — 83721 ASSAY OF BLOOD LIPOPROTEIN: CPT

## 2018-08-14 PROCEDURE — 83036 HEMOGLOBIN GLYCOSYLATED A1C: CPT

## 2018-08-14 PROCEDURE — 80053 COMPREHEN METABOLIC PANEL: CPT

## 2018-08-14 PROCEDURE — 84443 ASSAY THYROID STIM HORMONE: CPT

## 2018-08-14 PROCEDURE — 36415 COLL VENOUS BLD VENIPUNCTURE: CPT

## 2018-08-14 PROCEDURE — 84550 ASSAY OF BLOOD/URIC ACID: CPT

## 2018-08-16 ENCOUNTER — PROCEDURE VISIT (OUTPATIENT)
Dept: UROLOGY | Facility: MEDICAL CENTER | Age: 68
End: 2018-08-16
Payer: MEDICARE

## 2018-08-16 DIAGNOSIS — R39.15 URGENCY OF URINATION: ICD-10-CM

## 2018-08-16 DIAGNOSIS — R35.0 URINARY FREQUENCY: ICD-10-CM

## 2018-08-16 PROCEDURE — 64566 NEUROELTRD STIM POST TIBIAL: CPT

## 2018-08-16 NOTE — PROGRESS NOTES
Procedure: Percutaneous Tibial Nerve Stimulation (PTNS)   Date onset of symptoms MORE THAN 2 YEARS AGO  Behavior modification: DIDN'T WORK  Biofeedback: DIDN'T WORK  E-Stim: NO  Drugs: OXYBUTYNIN, VESICARE, MYRBETRIQ, AND TOVIAZ DIDN'T WORK  Other: NONE  Session number: ONE MONTH F/U     Patient Goals and Progress   Decreased urgency: YES  Decreased frequency: YES  No accidents: YES   Sleeps through the night: YES  No related health and social factors: NO     Caffeine - cups per day: 1  Alcohol - number of drinks per day: 0   Daytime voids - number per day: 10-14    Nighttime voids - number per night: 0  Urgency: 1-2   Incontinence - episodes per day: 0     Treatment Plan   Increase fluids: NO   Decrease fluids: YES   Decrease caffeine: YES   Urge reduction techniques: YES  Kegels: YES   Toilet every 3-4 hours     No fluids before bed YES     Ankle Used: RIGHT     Treatment settin  Duration of treatment: 30 MINUTES  Lead lot #: [id number]  Expiration Date: 10-       Feeling/Response:  Toe flex and foot sensation

## 2018-10-04 ENCOUNTER — OFFICE VISIT (OUTPATIENT)
Dept: FAMILY MEDICINE CLINIC | Facility: CLINIC | Age: 68
End: 2018-10-04
Payer: MEDICARE

## 2018-10-04 VITALS
SYSTOLIC BLOOD PRESSURE: 132 MMHG | BODY MASS INDEX: 39.4 KG/M2 | OXYGEN SATURATION: 97 % | HEIGHT: 70 IN | HEART RATE: 72 BPM | TEMPERATURE: 96 F | DIASTOLIC BLOOD PRESSURE: 70 MMHG | WEIGHT: 275.2 LBS | RESPIRATION RATE: 17 BRPM

## 2018-10-04 DIAGNOSIS — M10.9 GOUT, UNSPECIFIED CAUSE, UNSPECIFIED CHRONICITY, UNSPECIFIED SITE: ICD-10-CM

## 2018-10-04 DIAGNOSIS — Z23 IMMUNIZATION DUE: ICD-10-CM

## 2018-10-04 DIAGNOSIS — Z23 ENCOUNTER FOR IMMUNIZATION: ICD-10-CM

## 2018-10-04 DIAGNOSIS — G62.9 NEUROPATHY: Primary | ICD-10-CM

## 2018-10-04 DIAGNOSIS — I10 HYPERTENSION, UNSPECIFIED TYPE: ICD-10-CM

## 2018-10-04 DIAGNOSIS — E78.5 HYPERLIPIDEMIA, UNSPECIFIED HYPERLIPIDEMIA TYPE: ICD-10-CM

## 2018-10-04 PROCEDURE — G0008 ADMIN INFLUENZA VIRUS VAC: HCPCS

## 2018-10-04 PROCEDURE — 90662 IIV NO PRSV INCREASED AG IM: CPT

## 2018-10-04 PROCEDURE — 99214 OFFICE O/P EST MOD 30 MIN: CPT | Performed by: INTERNAL MEDICINE

## 2018-10-04 PROCEDURE — 90471 IMMUNIZATION ADMIN: CPT

## 2018-10-04 RX ORDER — OXYCODONE HYDROCHLORIDE AND ACETAMINOPHEN 5; 325 MG/1; MG/1
TABLET ORAL
Qty: 30 TABLET | Refills: 0 | Status: SHIPPED | OUTPATIENT
Start: 2018-10-04 | End: 2018-10-04 | Stop reason: SDUPTHER

## 2018-10-04 RX ORDER — OXYCODONE HYDROCHLORIDE AND ACETAMINOPHEN 5; 325 MG/1; MG/1
TABLET ORAL
Qty: 30 TABLET | Refills: 0 | Status: SHIPPED | OUTPATIENT
Start: 2018-10-04 | End: 2019-01-29 | Stop reason: SDUPTHER

## 2018-10-05 NOTE — PROGRESS NOTES
Assessment/Plan:      Diet reviewed  Lifestyle modifications reviewed  Medications reviewed and ordered  Laboratory tests and studies reviewed and ordered  All patient's questions answered to patient satisfaction  Diagnoses and all orders for this visit:    Neuropathy  -     Discontinue: oxyCODONE-acetaminophen (PERCOCET) 5-325 mg per tablet; Earliest Fill Date: 10/4/18 1/d prn pain  -     oxyCODONE-acetaminophen (PERCOCET) 5-325 mg per tablet; 1/d prn pain    Hypertension, unspecified type  -     CT coronary calcium score; Future    Hyperlipidemia, unspecified hyperlipidemia type  -     CT coronary calcium score; Future    Gout, unspecified cause, unspecified chronicity, unspecified site    Immunization due    Encounter for immunization  -     influenza vaccine, [id number], high-dose, PF 0 5 mL, for patients 65 yr+ (FLUZONE HIGH-DOSE)  -     influenza vaccine, [id number], high-dose, PF 0 5 mL, for patients 65 yr+ (FLUZONE HIGH-DOSE)    Other orders  -     Cancel: influenza vaccine, [id number], high-dose, PF 0 5 mL, for patients 65 yr+ (FLUZONE HIGH-DOSE)        Subjective:      Patient ID: Loida Marks is a 76 y o  male  HPI  This 80-year-old male is seen today for the following: Follow up of the episode of acute inflammatory arthritis of the 1st MCP that was presumed to be due from gout pseudogout and degenerative arthritis and responded to several days of prednisone therapy  Uric acid is 7 0  Patient has a prednisone pack at home to take should this recur at a later time consideration for treatment for gout with Colcrys/? Allopurinol may be indicated if this occurrence his frequent  Hyperlipidemia the patient has cardiac risks despite the fact that his blood pressure in a door and will hyperlipidemia are well controlled    Therefore a coronary calcium count has been ordered to further assess his cardiac risk and need for more aggressive cholesterol therapy for possible secondary prevention if if coronary calcium score is elevated  Current Outpatient Prescriptions:     acetaminophen (TYLENOL) 500 mg tablet, every 24 hours, Disp: , Rfl:     amoxicillin (AMOXIL) 500 mg capsule, Only dental work, Disp: , Rfl:     atorvastatin (LIPITOR) 20 mg tablet, Take by mouth, Disp: , Rfl:     Cholecalciferol (VITAMIN D-3) 5000 units TABS, take 1 tablet daily, Disp: , Rfl:     gabapentin (NEURONTIN) 100 mg capsule, Take 4 tabs daily, Disp: 360 capsule, Rfl: 3    lisinopril (ZESTRIL) 40 mg tablet, Take 20 mg by mouth, Disp: , Rfl:     oxyCODONE-acetaminophen (PERCOCET) 5-325 mg per tablet, 1/d prn pain, Disp: 30 tablet, Rfl: 0    Methylprednisolone 4 MG TBPK, Use as directed on package (Patient not taking: Reported on 10/4/2018 ), Disp: 21 tablet, Rfl: 0    The following portions of the patient's history were reviewed and updated as appropriate: allergies, current medications, past family history, past medical history, past social history, past surgical history and problem list     Review of Systems   Constitutional: Negative for appetite change, fatigue, fever and unexpected weight change  HENT: Negative for rhinorrhea, sinus pain, sinus pressure, sneezing and sore throat  Eyes: Negative for visual disturbance  Respiratory: Negative for cough, chest tightness, shortness of breath and wheezing  Cardiovascular: Negative for chest pain, palpitations and leg swelling  Gastrointestinal: Negative for abdominal distention, abdominal pain, blood in stool, constipation, diarrhea, nausea and vomiting  Endocrine: Negative for polydipsia and polyuria  Genitourinary: Negative for decreased urine volume, difficulty urinating, dysuria, hematuria and urgency  Musculoskeletal: Negative for arthralgias, back pain, joint swelling and neck pain  Skin: Negative for rash  Allergic/Immunologic: Negative for environmental allergies     Neurological: Negative for tremors, weakness, light-headedness, numbness and headaches  Hematological: Does not bruise/bleed easily  Psychiatric/Behavioral: Negative for agitation, behavioral problems, confusion and dysphoric mood  The patient is not nervous/anxious  Family History   Problem Relation Age of Onset    Cancer Mother     Dementia Mother     Cancer Father        Past Medical History:   Diagnosis Date    Arthritis     Hypertension     Obesity        Past Surgical History:   Procedure Laterality Date    CERVICAL DISCECTOMY      KNEE SURGERY      SPINE SURGERY         Social History     Social History    Marital status:      Spouse name: N/A    Number of children: N/A    Years of education: N/A     Social History Main Topics    Smoking status: Former Smoker    Smokeless tobacco: Former User      Comment: quit 10 years ago pipe, NEVER A SMOKER AS PER NEXTGEN    Alcohol use No    Drug use: No    Sexual activity: Not Asked     Other Topics Concern    None     Social History Narrative    None       Allergies   Allergen Reactions    Ciprofloxacin GI Intolerance    Cephalexin     Oxybutynin GI Intolerance    Solifenacin Dizziness    Tiotropium Bromide Monohydrate GI Intolerance         Objective:      /70 (BP Location: Left arm, Patient Position: Sitting, Cuff Size: Large)   Pulse 72   Temp (!) 96 °F (35 6 °C)   Resp 17   Ht 5' 10" (1 778 m)   Wt 125 kg (275 lb 3 2 oz)   SpO2 97%   BMI 39 49 kg/m²        Physical Exam   Constitutional: He is oriented to person, place, and time  He appears well-developed and well-nourished  No distress  HENT:   Head: Normocephalic and atraumatic  Nose: Nose normal    Mouth/Throat: Oropharynx is clear and moist  No oropharyngeal exudate  Eyes: Pupils are equal, round, and reactive to light  Conjunctivae and EOM are normal  No scleral icterus  Neck: Normal range of motion  Neck supple  No JVD present  No tracheal deviation present  No thyromegaly present     Cardiovascular: Normal rate, regular rhythm and normal heart sounds  Exam reveals no gallop and no friction rub  No murmur heard  Pulmonary/Chest: Effort normal  No respiratory distress  He has no wheezes  He has no rales  He exhibits no tenderness  Abdominal: Soft  Bowel sounds are normal  He exhibits no distension and no mass  There is no tenderness  There is no rebound and no guarding  Musculoskeletal: Normal range of motion  He exhibits no edema or deformity  Lymphadenopathy:     He has no cervical adenopathy  Neurological: He is alert and oriented to person, place, and time  No cranial nerve deficit  Coordination normal    Skin: Skin is warm and dry  No rash noted  Psychiatric: He has a normal mood and affect   His behavior is normal  Judgment and thought content normal

## 2018-10-18 ENCOUNTER — PROCEDURE VISIT (OUTPATIENT)
Dept: UROLOGY | Facility: MEDICAL CENTER | Age: 68
End: 2018-10-18
Payer: MEDICARE

## 2018-10-18 DIAGNOSIS — R35.0 URINARY FREQUENCY: ICD-10-CM

## 2018-10-18 DIAGNOSIS — R39.15 URINARY URGENCY: ICD-10-CM

## 2018-10-18 PROCEDURE — 64566 NEUROELTRD STIM POST TIBIAL: CPT

## 2018-10-18 NOTE — PROGRESS NOTES
Procedure: Percutaneous Tibial Nerve Stimulation (PTNS)       Date onset of symptoms A LONG TIME AGO  Behavior modification: DIDN'T WORK  Biofeedback: DIDN'T WORK  STILL DOING KEGELS  E-Stim: NO  Drugs: OXYBUTYNIN, VESICARE, MYRBETRIQ, AND TOVIAZ DIDN'T WORK   Other: NONE  Session number: ONE MONTH F/U     Patient Goals and Progress   Decreased urgency: YES  Decreased frequency: YES  No accidents: YES    Sleeps through the night: YES  No related health and social factors: NO     Caffeine - cups per day: 1  Alcohol - number of drinks per day: 0  Daytime voids - number per day: 10-13   Nighttime voids - number per night: 0  Urgency: 1-2      Incontinence - episodes per day: 0     Treatment Plan   Increase fluids: NO  Decrease fluids: YES    Decrease caffeine: YES  Urge reduction techniques: YES  Kegels: YES   Toilet every 3-4 hours     No fluids before bed YES     Ankle Used: RIGHT     Treatment settin  Duration of treatment: 30 MINUTES  Lead lot #: [id number]  Expiration Date: 10-       Feeling/Response: Toe flex and foot sensation     PT CANNOT CONTINUE PTNS TXS D/T MCR  F/U WITH DR Therese Cox NEXT MONTH ON SCHEDULE IN OFFICE

## 2018-10-18 NOTE — PROGRESS NOTES
I have reviewed the notes, assessments, and/or procedures performed , I concur with her/his documentation of Frances Regan

## 2018-10-22 ENCOUNTER — HOSPITAL ENCOUNTER (OUTPATIENT)
Dept: CT IMAGING | Facility: HOSPITAL | Age: 68
Discharge: HOME | End: 2018-10-22
Payer: COMMERCIAL

## 2018-10-22 DIAGNOSIS — I10 HYPERTENSION, UNSPECIFIED TYPE: ICD-10-CM

## 2018-10-22 DIAGNOSIS — E78.5 HYPERLIPIDEMIA, UNSPECIFIED HYPERLIPIDEMIA TYPE: ICD-10-CM

## 2018-10-30 ENCOUNTER — OFFICE VISIT (OUTPATIENT)
Dept: FAMILY MEDICINE CLINIC | Facility: CLINIC | Age: 68
End: 2018-10-30
Payer: MEDICARE

## 2018-10-30 VITALS
DIASTOLIC BLOOD PRESSURE: 62 MMHG | RESPIRATION RATE: 17 BRPM | BODY MASS INDEX: 39.65 KG/M2 | TEMPERATURE: 98.2 F | WEIGHT: 277 LBS | HEART RATE: 80 BPM | SYSTOLIC BLOOD PRESSURE: 118 MMHG | HEIGHT: 70 IN | OXYGEN SATURATION: 98 %

## 2018-10-30 DIAGNOSIS — I25.84 CORONARY ATHEROSCLEROSIS DUE TO CALCIFIED CORONARY LESION OF NATIVE ARTERY: ICD-10-CM

## 2018-10-30 DIAGNOSIS — G62.9 NEUROPATHY: ICD-10-CM

## 2018-10-30 DIAGNOSIS — I25.10 CORONARY ARTERY DISEASE INVOLVING NATIVE CORONARY ARTERY OF NATIVE HEART WITHOUT ANGINA PECTORIS: ICD-10-CM

## 2018-10-30 DIAGNOSIS — I25.10 CORONARY ATHEROSCLEROSIS DUE TO CALCIFIED CORONARY LESION OF NATIVE ARTERY: ICD-10-CM

## 2018-10-30 DIAGNOSIS — E78.5 HYPERLIPIDEMIA, UNSPECIFIED HYPERLIPIDEMIA TYPE: Primary | ICD-10-CM

## 2018-10-30 DIAGNOSIS — I10 HYPERTENSION, UNSPECIFIED TYPE: ICD-10-CM

## 2018-10-30 PROCEDURE — 93000 ELECTROCARDIOGRAM COMPLETE: CPT | Performed by: INTERNAL MEDICINE

## 2018-10-30 PROCEDURE — 99214 OFFICE O/P EST MOD 30 MIN: CPT | Performed by: INTERNAL MEDICINE

## 2018-10-30 RX ORDER — ATORVASTATIN CALCIUM 40 MG/1
40 TABLET, FILM COATED ORAL DAILY
Qty: 90 TABLET | Refills: 3 | Status: SHIPPED | OUTPATIENT
Start: 2018-10-30 | End: 2019-10-27 | Stop reason: SDUPTHER

## 2018-10-30 NOTE — PROGRESS NOTES
Assessment/Plan:  Patient has not had a recurrence of the inflammatory arthropathy in his left 1st metacarpal carpal joint which may be gout or pseudogout  Plan will be that he is to the take Tylenol as needed for pain  If he has increase in pain in that joint area or any redness he can take Aleve for short time if that does not work and is getting worse he can take the Medrol Dosepak and stop the Aleve  Not to remain on Aleve for any prolonged periods because of the increased cardiovascular risk  Coronary calcium score is 437 which is in the high risk range of over 400  Patient's EKG is normal in demonstrates sinus rhythm with a right bundle branch pattern  He denies exertional chest pain or new did shortness of breath or significant symptoms of heart failure and edema  Patient will start taking enteric-coated aspirin 81 mg daily  He does not have GI symptoms at this time  But could take famotidine 40 mg daily if necessary  The patient's LDL direct is 87 on 20 mg of atorvastatin daily which he has been on for excessive 15 years without difficulty that he is recognized  He would like to try going to 40 mg of atorvastatin daily and then if tolerated he can increase to 80 mg if necessary  Diet reviewed  Lifestyle modifications reviewed  Medications reviewed and ordered  Laboratory tests and studies reviewed and ordered  All patient's questions answered to patient satisfaction  Diagnoses and all orders for this visit:    Hyperlipidemia, unspecified hyperlipidemia type  -     atorvastatin (LIPITOR) 40 mg tablet; Take 1 tablet (40 mg total) by mouth daily  -     LDL cholesterol, direct; Future  -     CBC and differential; Future  -     Comprehensive metabolic panel; Future  -     POCT ECG    Coronary artery disease involving native coronary artery of native heart without angina pectoris  -     LDL cholesterol, direct;  Future  -     CBC and differential; Future  -     Comprehensive metabolic panel; Future  -     POCT ECG    Neuropathy    Hypertension, unspecified type  -     LDL cholesterol, direct; Future  -     CBC and differential; Future  -     Comprehensive metabolic panel; Future  -     POCT ECG    Coronary atherosclerosis due to calcified coronary lesion of native artery    Other orders  -     Naproxen Sodium (ALEVE PO); Take by mouth        Subjective:      Patient ID: Frances Regan is a 76 y o  male  HPI  This 41-year-old male returns following a CT coronary calcium count which was 486 and is in the severely elevated range of over 400  This was explained to the patient in great detail he had a score of 200 on the left and 200 on the right  It was explained to him how this required more intensive preventative treatment of coronary artery disease to prevent an event if possible  This will include stopping naproxen sodium unless it was absolutely necessary for the pain in his hand and taking Tylenol instead or the prednisone pack if it became very inflamed  He will start enteric-coated aspirin 81 mg daily and if he gets indigestion we will consider oral Pepcid  His LDL cholesterol is in the mid 80s on atorvastatin 20 mg daily and it was suggested that he increase this in small steps and he in attempt to get his LDL less than 70  He denied exertional chest discomfort or any chest discomfort other than indigestion  His EKG does not show any evidence of active coronary ischemia    He declined a pharmacological stress test     Current Outpatient Prescriptions:     amoxicillin (AMOXIL) 500 mg capsule, Only dental work, Disp: , Rfl:     Cholecalciferol (VITAMIN D-3) 5000 units TABS, take 1 tablet daily, Disp: , Rfl:     gabapentin (NEURONTIN) 100 mg capsule, Take 4 tabs daily, Disp: 360 capsule, Rfl: 3    lisinopril (ZESTRIL) 40 mg tablet, Take 20 mg by mouth, Disp: , Rfl:     Naproxen Sodium (ALEVE PO), Take by mouth, Disp: , Rfl:     oxyCODONE-acetaminophen (PERCOCET) 5-325 mg per tablet, 1/d prn pain, Disp: 30 tablet, Rfl: 0    atorvastatin (LIPITOR) 40 mg tablet, Take 1 tablet (40 mg total) by mouth daily, Disp: 90 tablet, Rfl: 3    The following portions of the patient's history were reviewed and updated as appropriate: allergies, current medications, past family history, past medical history, past social history, past surgical history and problem list     Review of Systems   Constitutional: Negative for appetite change, fatigue, fever and unexpected weight change  HENT: Negative for rhinorrhea, sinus pain, sinus pressure, sneezing and sore throat  Eyes: Negative for visual disturbance  Respiratory: Negative for cough, chest tightness, shortness of breath and wheezing  Cardiovascular: Negative for chest pain, palpitations and leg swelling  Gastrointestinal: Negative for abdominal distention, abdominal pain, blood in stool, constipation, diarrhea, nausea and vomiting  Endocrine: Negative for polydipsia and polyuria  Genitourinary: Negative for decreased urine volume, difficulty urinating, dysuria, hematuria and urgency  Musculoskeletal: Negative for arthralgias, back pain, joint swelling and neck pain  Skin: Negative for rash  Allergic/Immunologic: Negative for environmental allergies  Neurological: Negative for tremors, weakness, light-headedness, numbness and headaches  Hematological: Does not bruise/bleed easily  Psychiatric/Behavioral: Negative for agitation, behavioral problems, confusion and dysphoric mood  The patient is not nervous/anxious            Family History   Problem Relation Age of Onset    Cancer Mother     Dementia Mother     Cancer Father        Past Medical History:   Diagnosis Date    Arthritis     Hypertension     Obesity        Past Surgical History:   Procedure Laterality Date    CERVICAL DISCECTOMY      KNEE SURGERY      SPINE SURGERY         Social History     Social History    Marital status:      Spouse name: N/A    Number of children: N/A    Years of education: N/A     Occupational History    retired      Social History Main Topics    Smoking status: Former Smoker    Smokeless tobacco: Former User      Comment: quit 10 years ago pipe, NEVER A SMOKER AS PER NEXTGEN    Alcohol use No    Drug use: No    Sexual activity: No     Other Topics Concern    None     Social History Narrative    Pt states he has about 2-3 cups of cold Ice Tea daily       Allergies   Allergen Reactions    Ciprofloxacin GI Intolerance    Cephalexin     Oxybutynin GI Intolerance    Solifenacin Dizziness    Tiotropium Bromide Monohydrate GI Intolerance         Objective:      /62   Pulse 80   Temp 98 2 °F (36 8 °C) (Tympanic)   Resp 17   Ht 5' 10" (1 778 m)   Wt 126 kg (277 lb)   SpO2 98%   BMI 39 75 kg/m²        Physical Exam   Constitutional: He is oriented to person, place, and time  He appears well-developed  No distress  Morbidly obese   HENT:   Head: Normocephalic and atraumatic  Nose: Nose normal    Mouth/Throat: Oropharynx is clear and moist  No oropharyngeal exudate  Eyes: Pupils are equal, round, and reactive to light  Conjunctivae and EOM are normal  No scleral icterus  Neck: Normal range of motion  Neck supple  No JVD present  No tracheal deviation present  No thyromegaly present  Cardiovascular: Normal rate, regular rhythm and normal heart sounds  Exam reveals no gallop and no friction rub  No murmur heard  Pulmonary/Chest: Effort normal  No respiratory distress  He has no wheezes  He has no rales  He exhibits no tenderness  Abdominal: Soft  Bowel sounds are normal  He exhibits no distension and no mass  There is no tenderness  There is no rebound and no guarding  Musculoskeletal: Normal range of motion  He exhibits no edema or deformity  Lymphadenopathy:     He has no cervical adenopathy  Neurological: He is alert and oriented to person, place, and time  No cranial nerve deficit   Coordination normal  Skin: Skin is warm and dry  No rash noted  Psychiatric: He has a normal mood and affect   His behavior is normal  Judgment and thought content normal

## 2018-11-02 ENCOUNTER — TELEPHONE (OUTPATIENT)
Dept: FAMILY MEDICINE CLINIC | Facility: CLINIC | Age: 68
End: 2018-11-02

## 2018-11-02 NOTE — TELEPHONE ENCOUNTER
Call from pt states that he was recently seen with Dr Dos Santos and had a change of medication  Pt's Atorvastatin was increased from 20mg to 40mg  Pt was also told to take Asprin 81mg  Pt states that along with these medications he is taking Tylenol Arthritis  Pt is experiencing stomach issues which was told by Kyle Dubin that this may be expected  Pt is also having muscling cramping in the back of his thigh  Pt would like a call back in regards to these issues he is having  Please advise  Pt can be reached at the number on file thank you

## 2018-11-03 DIAGNOSIS — E87.6 HYPOKALEMIA: Primary | ICD-10-CM

## 2018-11-03 DIAGNOSIS — K29.60 OTHER GASTRITIS WITHOUT HEMORRHAGE, UNSPECIFIED CHRONICITY: ICD-10-CM

## 2018-11-03 RX ORDER — FAMOTIDINE 40 MG/1
40 TABLET, FILM COATED ORAL DAILY
Qty: 30 TABLET | Refills: 11 | Status: SHIPPED | OUTPATIENT
Start: 2018-11-03 | End: 2019-06-10 | Stop reason: SDUPTHER

## 2018-11-03 RX ORDER — POTASSIUM CHLORIDE 600 MG/1
8 TABLET, FILM COATED, EXTENDED RELEASE ORAL 2 TIMES DAILY
Qty: 30 TABLET | Refills: 11 | Status: SHIPPED | OUTPATIENT
Start: 2018-11-03 | End: 2018-11-06 | Stop reason: SDUPTHER

## 2018-11-05 ENCOUNTER — TELEPHONE (OUTPATIENT)
Dept: FAMILY MEDICINE CLINIC | Facility: CLINIC | Age: 68
End: 2018-11-05

## 2018-11-05 NOTE — TELEPHONE ENCOUNTER
Melo from [facility] called and needs clarification on the patient's medication that was called in over the weekend  They need to his Potassium Chloride 8 mEQ tablets-1 tablet daily 2 times daily  Please call Melo at [address] to advise

## 2018-11-06 DIAGNOSIS — E87.6 HYPOKALEMIA: ICD-10-CM

## 2018-11-06 RX ORDER — POTASSIUM CHLORIDE 600 MG/1
8 TABLET, FILM COATED, EXTENDED RELEASE ORAL DAILY
Qty: 30 TABLET | Refills: 11 | Status: SHIPPED | OUTPATIENT
Start: 2018-11-06 | End: 2019-06-10 | Stop reason: SDUPTHER

## 2018-12-12 DIAGNOSIS — R39.15 URGENCY OF URINATION: ICD-10-CM

## 2019-01-14 ENCOUNTER — APPOINTMENT (OUTPATIENT)
Dept: LAB | Age: 69
End: 2019-01-14
Payer: MEDICARE

## 2019-01-14 DIAGNOSIS — R39.15 URGENCY OF URINATION: ICD-10-CM

## 2019-01-14 DIAGNOSIS — I25.10 CORONARY ARTERY DISEASE INVOLVING NATIVE CORONARY ARTERY OF NATIVE HEART WITHOUT ANGINA PECTORIS: ICD-10-CM

## 2019-01-14 DIAGNOSIS — E78.5 HYPERLIPIDEMIA, UNSPECIFIED HYPERLIPIDEMIA TYPE: ICD-10-CM

## 2019-01-14 DIAGNOSIS — I10 HYPERTENSION, UNSPECIFIED TYPE: ICD-10-CM

## 2019-01-14 LAB
ALBUMIN SERPL BCP-MCNC: 4 G/DL (ref 3.5–5)
ALP SERPL-CCNC: 136 U/L (ref 46–116)
ALT SERPL W P-5'-P-CCNC: 33 U/L (ref 12–78)
ANION GAP SERPL CALCULATED.3IONS-SCNC: 9 MMOL/L (ref 4–13)
AST SERPL W P-5'-P-CCNC: 19 U/L (ref 5–45)
BASOPHILS # BLD AUTO: 0.04 THOUSANDS/ΜL (ref 0–0.1)
BASOPHILS NFR BLD AUTO: 1 % (ref 0–1)
BILIRUB SERPL-MCNC: 1.17 MG/DL (ref 0.2–1)
BUN SERPL-MCNC: 29 MG/DL (ref 5–25)
CALCIUM SERPL-MCNC: 9.5 MG/DL (ref 8.3–10.1)
CHLORIDE SERPL-SCNC: 106 MMOL/L (ref 100–108)
CO2 SERPL-SCNC: 27 MMOL/L (ref 21–32)
CREAT SERPL-MCNC: 0.94 MG/DL (ref 0.6–1.3)
EOSINOPHIL # BLD AUTO: 0.23 THOUSAND/ΜL (ref 0–0.61)
EOSINOPHIL NFR BLD AUTO: 3 % (ref 0–6)
ERYTHROCYTE [DISTWIDTH] IN BLOOD BY AUTOMATED COUNT: 13.8 % (ref 11.6–15.1)
GFR SERPL CREATININE-BSD FRML MDRD: 83 ML/MIN/1.73SQ M
GLUCOSE P FAST SERPL-MCNC: 96 MG/DL (ref 65–99)
HCT VFR BLD AUTO: 45.9 % (ref 36.5–49.3)
HGB BLD-MCNC: 14.4 G/DL (ref 12–17)
IMM GRANULOCYTES # BLD AUTO: 0.04 THOUSAND/UL (ref 0–0.2)
IMM GRANULOCYTES NFR BLD AUTO: 1 % (ref 0–2)
LDLC SERPL DIRECT ASSAY-MCNC: 84 MG/DL (ref 0–100)
LYMPHOCYTES # BLD AUTO: 2.05 THOUSANDS/ΜL (ref 0.6–4.47)
LYMPHOCYTES NFR BLD AUTO: 26 % (ref 14–44)
MCH RBC QN AUTO: 30.9 PG (ref 26.8–34.3)
MCHC RBC AUTO-ENTMCNC: 31.4 G/DL (ref 31.4–37.4)
MCV RBC AUTO: 99 FL (ref 82–98)
MONOCYTES # BLD AUTO: 0.71 THOUSAND/ΜL (ref 0.17–1.22)
MONOCYTES NFR BLD AUTO: 9 % (ref 4–12)
NEUTROPHILS # BLD AUTO: 4.86 THOUSANDS/ΜL (ref 1.85–7.62)
NEUTS SEG NFR BLD AUTO: 60 % (ref 43–75)
NRBC BLD AUTO-RTO: 0 /100 WBCS
PLATELET # BLD AUTO: 182 THOUSANDS/UL (ref 149–390)
PMV BLD AUTO: 10.1 FL (ref 8.9–12.7)
POTASSIUM SERPL-SCNC: 4.2 MMOL/L (ref 3.5–5.3)
PROT SERPL-MCNC: 7.6 G/DL (ref 6.4–8.2)
PSA SERPL-MCNC: 0.9 NG/ML (ref 0–4)
RBC # BLD AUTO: 4.66 MILLION/UL (ref 3.88–5.62)
SODIUM SERPL-SCNC: 142 MMOL/L (ref 136–145)
WBC # BLD AUTO: 7.93 THOUSAND/UL (ref 4.31–10.16)

## 2019-01-14 PROCEDURE — 36415 COLL VENOUS BLD VENIPUNCTURE: CPT

## 2019-01-14 PROCEDURE — 80053 COMPREHEN METABOLIC PANEL: CPT

## 2019-01-14 PROCEDURE — 84153 ASSAY OF PSA TOTAL: CPT

## 2019-01-14 PROCEDURE — 85025 COMPLETE CBC W/AUTO DIFF WBC: CPT

## 2019-01-14 PROCEDURE — 83721 ASSAY OF BLOOD LIPOPROTEIN: CPT

## 2019-01-28 ENCOUNTER — TELEPHONE (OUTPATIENT)
Dept: FAMILY MEDICINE CLINIC | Facility: CLINIC | Age: 69
End: 2019-01-28

## 2019-01-28 NOTE — TELEPHONE ENCOUNTER
Patient called and requested a medication refill for oxyCODONE-acetaminophen (PERCOCET) 5-325 mg per tablet - 1/d prn pain, please call the patient when it is ready to be picked up [phone]

## 2019-01-29 DIAGNOSIS — G62.9 NEUROPATHY: ICD-10-CM

## 2019-01-29 RX ORDER — OXYCODONE HYDROCHLORIDE AND ACETAMINOPHEN 5; 325 MG/1; MG/1
1 TABLET ORAL DAILY PRN
Qty: 30 TABLET | Refills: 0 | Status: SHIPPED | OUTPATIENT
Start: 2019-01-29 | End: 2019-06-18 | Stop reason: SDUPTHER

## 2019-01-29 RX ORDER — OXYCODONE HYDROCHLORIDE AND ACETAMINOPHEN 5; 325 MG/1; MG/1
TABLET ORAL
Qty: 30 TABLET | Refills: 0 | Status: SHIPPED | OUTPATIENT
Start: 2019-01-29 | End: 2019-01-29 | Stop reason: SDUPTHER

## 2019-01-31 ENCOUNTER — TELEPHONE (OUTPATIENT)
Dept: FAMILY MEDICINE CLINIC | Facility: CLINIC | Age: 69
End: 2019-01-31

## 2019-01-31 NOTE — TELEPHONE ENCOUNTER
Pt called and will be in Monday to  his Rx  Rx is printed and signed  It is in the Pt pickup at the

## 2019-03-19 ENCOUNTER — TELEPHONE (OUTPATIENT)
Dept: UROLOGY | Facility: CLINIC | Age: 69
End: 2019-03-19

## 2019-03-19 ENCOUNTER — OFFICE VISIT (OUTPATIENT)
Dept: FAMILY MEDICINE CLINIC | Facility: CLINIC | Age: 69
End: 2019-03-19
Payer: MEDICARE

## 2019-03-19 VITALS
BODY MASS INDEX: 38.37 KG/M2 | RESPIRATION RATE: 17 BRPM | HEART RATE: 76 BPM | WEIGHT: 268 LBS | HEIGHT: 70 IN | OXYGEN SATURATION: 95 % | TEMPERATURE: 98.9 F

## 2019-03-19 DIAGNOSIS — K83.1 CHOLESTASIS: ICD-10-CM

## 2019-03-19 DIAGNOSIS — R73.03 PRE-DIABETES: ICD-10-CM

## 2019-03-19 DIAGNOSIS — M54.12 BRACHIAL RADICULITIS: ICD-10-CM

## 2019-03-19 DIAGNOSIS — E66.01 MORBID (SEVERE) OBESITY DUE TO EXCESS CALORIES (HCC): ICD-10-CM

## 2019-03-19 DIAGNOSIS — Z00.00 MEDICARE ANNUAL WELLNESS VISIT, SUBSEQUENT: Primary | ICD-10-CM

## 2019-03-19 DIAGNOSIS — E78.00 PURE HYPERCHOLESTEROLEMIA: ICD-10-CM

## 2019-03-19 DIAGNOSIS — M54.12 CERVICAL RADICULOPATHY: ICD-10-CM

## 2019-03-19 DIAGNOSIS — I25.10 CORONARY ARTERY DISEASE INVOLVING NATIVE CORONARY ARTERY OF NATIVE HEART WITHOUT ANGINA PECTORIS: ICD-10-CM

## 2019-03-19 PROCEDURE — G0439 PPPS, SUBSEQ VISIT: HCPCS | Performed by: INTERNAL MEDICINE

## 2019-03-19 PROCEDURE — 99214 OFFICE O/P EST MOD 30 MIN: CPT | Performed by: INTERNAL MEDICINE

## 2019-03-19 NOTE — PROGRESS NOTES
Assessment/Plan:      Diet reviewed  Lifestyle modifications reviewed  Medications reviewed and ordered  Laboratory tests and studies reviewed and ordered  All patient's questions answered to patient satisfaction  Diagnoses and all orders for this visit:    Medicare annual wellness visit, subsequent    Brachial radiculitis    Cervical radiculopathy    Need for hepatitis C screening test    Encounter for vaccination    Coronary artery disease involving native coronary artery of native heart without angina pectoris    Morbid (severe) obesity due to excess calories ([facility] [address] )    Pre-diabetes    Pure hypercholesterolemia    Other orders  -     Cancel: Ambulatory referral to Gastroenterology; Future  -     Cancel: Occult Blood, Fecal Immunochemical; Future  -     Cancel: Hepatitis C antibody; Future  -     Cancel: PNEUMOCOCCAL POLYSACCHARIDE VACCINE 23-VALENT =>1YO SQ IM        Subjective:      Patient ID: Darinel Green is a 76 y o  male      HPI      Current Outpatient Medications:     amoxicillin (AMOXIL) 500 mg capsule, Only dental work, Disp: , Rfl:     atorvastatin (LIPITOR) 40 mg tablet, Take 1 tablet (40 mg total) by mouth daily, Disp: 90 tablet, Rfl: 3    Cholecalciferol (VITAMIN D-3) 5000 units TABS, take 1 tablet daily, Disp: , Rfl:     famotidine (PEPCID) 40 MG tablet, Take 1 tablet (40 mg total) by mouth daily, Disp: 30 tablet, Rfl: 11    gabapentin (NEURONTIN) 100 mg capsule, Take 4 tabs daily, Disp: 360 capsule, Rfl: 3    lisinopril (ZESTRIL) 40 mg tablet, Take 20 mg by mouth, Disp: , Rfl:     oxyCODONE-acetaminophen (PERCOCET) 5-325 mg per tablet, Take 1 tablet by mouth daily as needed for moderate pain 1/d prn pain Max Daily Amount: 1 tablet, Disp: 30 tablet, Rfl: 0    potassium chloride (KLOR-CON) 8 MEQ tablet, Take 1 tablet (8 mEq total) by mouth daily, Disp: 30 tablet, Rfl: 11    The following portions of the patient's history were reviewed and updated as appropriate: allergies, current medications, past family history, past medical history, past social history, past surgical history and problem list     Review of Systems      Family History   Problem Relation Age of Onset    Cancer Mother     Dementia Mother     Cancer Father        Past Medical History:   Diagnosis Date    Arthritis     Hypertension     Obesity        Past Surgical History:   Procedure Laterality Date    CERVICAL DISCECTOMY      KNEE SURGERY      SPINE SURGERY         Social History     Socioeconomic History    Marital status:      Spouse name: None    Number of children: None    Years of education: None    Highest education level: None   Occupational History    Occupation: retired   Social Needs    Financial resource strain: None    Food insecurity:     Worry: None     Inability: None    Transportation needs:     Medical: None     Non-medical: None   Tobacco Use    Smoking status: Former Smoker    Smokeless tobacco: Former User    Tobacco comment: quit 10 years ago pipe, NEVER A SMOKER AS PER NEXTGEN   Substance and Sexual Activity    Alcohol use: No    Drug use: No    Sexual activity: Never     Partners: Female     Birth control/protection: Abstinence   Lifestyle    Physical activity:     Days per week: None     Minutes per session: None    Stress: None   Relationships    Social connections:     Talks on phone: None     Gets together: None     Attends Shinto service: None     Active member of club or organization: None     Attends meetings of clubs or organizations: None     Relationship status: None    Intimate partner violence:     Fear of current or ex partner: None     Emotionally abused: None     Physically abused: None     Forced sexual activity: None   Other Topics Concern    None   Social History Narrative    Pt states he has about 2-3 cups of cold Ice Tea daily       Allergies   Allergen Reactions    Ciprofloxacin GI Intolerance    Cephalexin     Oxybutynin GI Intolerance    Solifenacin Dizziness    Tiotropium Bromide Monohydrate GI Intolerance         Objective:      Pulse 76   Temp 98 9 °F (37 2 °C)   Resp 17   Ht 5' 10" (1 778 m)   Wt 122 kg (268 lb)   SpO2 95%   BMI 38 45 kg/m²        Physical Exam

## 2019-03-19 NOTE — PROGRESS NOTES
Assessment and Plan:  Problem List Items Addressed This Visit        Cardiovascular and Mediastinum    Coronary artery disease involving native coronary artery of native heart without angina pectoris       Nervous and Auditory    Brachial radiculitis    Cervical radiculopathy       Other    Hyperlipidemia    Morbid (severe) obesity due to excess calories ([address] )    Pre-diabetes      Other Visit Diagnoses     Medicare annual wellness visit, subsequent    -  Primary    Encounter for vaccination            Health Maintenance Due   Topic Date Due    Hepatitis C Screening  1950    Medicare Annual Wellness Visit (AWV)  1950    CRC Screening: Colonoscopy  1950    BMI: Followup Plan  03/31/1968         HPI:  Patient Active Problem List   Diagnosis    Arthropathy of knee    Brachial radiculitis    Cervical radicular pain    Cervical radiculopathy    Cervical spondylosis with myelopathy    Chronic pain disorder    Benign essential hypertension    Gait disturbance    Hyperlipidemia    Lumbar stenosis    Lumbosacral radiculitis    Peripheral neuropathy    S/P cervical spinal fusion    Urinary urgency    Vitamin B deficiency    Vitamin D deficiency    Acute idiopathic gout of left wrist    Coronary artery disease involving native coronary artery of native heart without angina pectoris    Morbid (severe) obesity due to excess calories ([address] )    Pre-diabetes     Past Medical History:   Diagnosis Date    Arthritis     Hypertension     Obesity      Past Surgical History:   Procedure Laterality Date    CERVICAL DISCECTOMY      KNEE SURGERY      SPINE SURGERY       Family History   Problem Relation Age of Onset    Cancer Mother     Dementia Mother     Cancer Father      Social History     Tobacco Use   Smoking Status Former Smoker   Smokeless Tobacco Former User   Tobacco Comment    quit 10 years ago pipe, NEVER A SMOKER AS PER [employer]     Social History     Substance and Sexual Activity Alcohol Use No      Social History     Substance and Sexual Activity   Drug Use No         Current Outpatient Medications   Medication Sig Dispense Refill    amoxicillin (AMOXIL) 500 mg capsule Only dental work      atorvastatin (LIPITOR) 40 mg tablet Take 1 tablet (40 mg total) by mouth daily 90 tablet 3    Cholecalciferol (VITAMIN D-3) 5000 units TABS take 1 tablet daily      famotidine (PEPCID) 40 MG tablet Take 1 tablet (40 mg total) by mouth daily 30 tablet 11    gabapentin (NEURONTIN) 100 mg capsule Take 4 tabs daily 360 capsule 3    lisinopril (ZESTRIL) 40 mg tablet Take 20 mg by mouth      oxyCODONE-acetaminophen (PERCOCET) 5-325 mg per tablet Take 1 tablet by mouth daily as needed for moderate pain 1/d prn pain Max Daily Amount: 1 tablet 30 tablet 0    potassium chloride (KLOR-CON) 8 MEQ tablet Take 1 tablet (8 mEq total) by mouth daily 30 tablet 11     No current facility-administered medications for this visit  Allergies   Allergen Reactions    Ciprofloxacin GI Intolerance    Cephalexin     Oxybutynin GI Intolerance    Solifenacin Dizziness    Tiotropium Bromide Monohydrate GI Intolerance     Immunization History   Administered Date(s) Administered    INFLUENZA 11/07/2007, 10/17/2012, 10/15/2015    Influenza Split High Dose Preservative Free IM 10/15/2015    Influenza, high dose seasonal 0 5 mL 10/04/2018, 10/04/2018    Pneumococcal Conjugate 13-Valent 10/15/2015       Patient Care Team:  Oriana Chan MD as PCP - MD Oriana Duval MD Zenda Kail, MD    Medicare Screening Tests and Risk Assessments:      Health Risk Assessment:  Patient rates overall health as fair  Patient feels that their physical health rating is Slightly worse  Eyesight was rated as Same  Hearing was rated as Same  Patient feels that their emotional and mental health rating is Same  Pain experienced by patient in the last 7 days has been Some   Patient's pain rating has been 6/10  Patient states that he has experienced no weight loss or gain in last 6 months  Emotional/Mental Health:  Patient has been feeling nervous/anxious  PHQ-9 Depression Screening:    Frequency of the following problems over the past two weeks:      1  Little interest or pleasure in doing things: 0 - not at all      2  Feeling down, depressed, or hopeless: 0 - not at all  PHQ-2 Score: 0          Broken Bones/Falls: Fall Risk Assessment:    In the past year, patient has experienced: No history of falling in past year          Bladder/Bowel:  Patient has leaked urine accidently in the last six months  Patient reports no loss of bowel control  Immunizations:  Patient has had a flu vaccination within the last year  Patient has received a pneumonia shot  Patient has received a shingles shot  Patient has received tetanus/diphtheria shot  Home Safety:  Patient does not have trouble with stairs inside or outside of their home  Patient currently reports that there are no safety hazards present in home, working smoke alarms, working carbon monoxide detectors  Preventative Screenings:   prostate cancer screen performed, colon cancer screen completed, cholesterol screen completed, glaucoma eye exam completed,     Nutrition:  Current diet: Regular with servings of the following:    Medications:  Patient is not currently taking any over-the-counter supplements  Patient is able to manage medications  Lifestyle Choices:  Patient reports no tobacco use  Patient has not smoked or used tobacco in the past   Patient reports no alcohol use  Patient drives a vehicle  Patient wears seat belt          Activities of Daily Living:  Can get out of bed by his or her self, able to dress self, able to make own meals, able to do own shopping, able to bathe self, can do own laundry/housekeeping, can manage own money, pay bills and track expenses    Previous Hospitalizations:  No hospitalization or ED visit in past 12 months        Advanced Directives:  Patient has decided on a power of   Patient has spoken to designated power of   Patient has completed advanced directive  Preventative Screening/Counseling:      Cardiovascular:      General: Risks and Benefits Discussed and Screening Current      Counseling: Healthy Diet, Healthy Weight and Improve Cholesterol          Diabetes:      General: Risks and Benefits Discussed and Screening Current      Counseling: Healthy Diet and Healthy Weight          Prostate Cancer:      General: Risks and Benefits Discussed and Screening Current          Osteoporosis:      General: Risks and Benefits Discussed and Screening Not Indicated      Counseling: Calcium and Vitamin D Intake          AAA:  Male patient with age over [de-identified] years  General: Risks and Benefits Discussed and Patient Declines          Glaucoma:      General: Risks and Benefits Discussed and Screening Current          HIV:      General: Risks and Benefits Discussed          Hepatitis C:      General: Risks and Benefits Discussed and Screening Not Indicated        Advanced Directives:   Patient has living will for healthcare, has durable POA for healthcare, patient has an advanced directive  Information on ACP and/or AD provided  End of life assessment reviewed with patient  Provider agrees with end of life decisions        Immunizations:      Influenza: Risks & Benefits Discussed and Influenza UTD This Year      Pneumococcal: Risks & Benefits Discussed and Patient Declines      Shingrix: Risks & Benefits Discussed and Patient Declines      Hepatitis B (Low risk patients): Prevention Counseling and Series Not Indicated      Hepatitis B (Medium to high risk patients): Risks & Benefits Discussed and Patient Declines      Zostavax: Risks & Benefits Discussed and Patient Declines      TD: Risks & Benefits Discussed and Patient Declines      TDAP: Risks & Benefits Discussed and Patient Declines            No exam data present    Physical Exam:  Review of Systems   Gastrointestinal: Negative for bowel incontinence  Psychiatric/Behavioral: The patient is not nervous/anxious  Vitals:    03/19/19 1338   Pulse: 76   Resp: 17   Temp: 98 9 °F (37 2 °C)   SpO2: 95%   Weight: 122 kg (268 lb)   Height: 5' 10" (1 778 m)   Body mass index is 38 45 kg/m²      Physical Exam

## 2019-03-20 ENCOUNTER — TELEPHONE (OUTPATIENT)
Dept: FAMILY MEDICINE CLINIC | Facility: CLINIC | Age: 69
End: 2019-03-20

## 2019-03-20 DIAGNOSIS — E78.5 HYPERLIPIDEMIA, UNSPECIFIED HYPERLIPIDEMIA TYPE: Primary | ICD-10-CM

## 2019-03-20 RX ORDER — LISINOPRIL 40 MG/1
40 TABLET ORAL DAILY
Qty: 90 TABLET | Refills: 3 | Status: SHIPPED | OUTPATIENT
Start: 2019-03-20 | End: 2020-05-11

## 2019-03-20 NOTE — TELEPHONE ENCOUNTER
Patient called and would like to speak Andrei Alvarado in regards to his appt that he had on 3/19/2019    Please advise [phone]

## 2019-03-20 NOTE — PROGRESS NOTES
Assessment/Plan:    Patient has mild elevation of his alk-phos is 136  This will be repeated along with GGT  He takes only 1 exercise and Tylenol daily and is on atorvastatin 40 mg long-term  Patient has elevated coronary calcium count and his Lipitor dose of 40 mg has controlled his direct LDL in the 80s  He is also taking enteric-coated aspirin 81 mg daily and lisinopril 40 mg daily for blood pressure which is well controlled  Potassium is 4 2 on 8 mEq of potassium chloride daily  Diet reviewed  Lifestyle modifications reviewed  Medications reviewed and ordered  Laboratory tests and studies reviewed and ordered  All patient's questions answered to patient satisfaction  Diagnoses and all orders for this visit:    Medicare annual wellness visit, subsequent    Brachial radiculitis    Cervical radiculopathy    Coronary artery disease involving native coronary artery of native heart without angina pectoris    Morbid (severe) obesity due to excess calories ([facility] [address] )    Pre-diabetes    Pure hypercholesterolemia    Other orders  -     Cancel: Ambulatory referral to Gastroenterology; Future  -     Cancel: Occult Blood, Fecal Immunochemical; Future  -     Cancel: Hepatitis C antibody; Future  -     Cancel: PNEUMOCOCCAL POLYSACCHARIDE VACCINE 23-VALENT =>1YO SQ IM  -     Comprehensive metabolic panel; Future  -     CBC and differential; Future  -     Gamma GT; Future  -     LDL cholesterol, direct; Future        Subjective:      Patient ID: Audrey Delarosa is a 76 y o  male  HPI  This 80-year-old male is seen for the following:    Elevated coronary calcium count indicating underlying coronary disease without angina pectoris      Severe obesity    Pre diabetes with hemoglobin A1c of 5 7    Hyperlipidemia on Lipitor 40 mg daily    Hypertension on lisinopril 40 mg daily    Current Outpatient Medications:     amoxicillin (AMOXIL) 500 mg capsule, Only dental work, Disp: , Rfl:     atorvastatin (LIPITOR) 40 mg tablet, Take 1 tablet (40 mg total) by mouth daily, Disp: 90 tablet, Rfl: 3    Cholecalciferol (VITAMIN D-3) 5000 units TABS, take 1 tablet daily, Disp: , Rfl:     famotidine (PEPCID) 40 MG tablet, Take 1 tablet (40 mg total) by mouth daily, Disp: 30 tablet, Rfl: 11    gabapentin (NEURONTIN) 100 mg capsule, Take 4 tabs daily, Disp: 360 capsule, Rfl: 3    lisinopril (ZESTRIL) 40 mg tablet, Take 20 mg by mouth, Disp: , Rfl:     oxyCODONE-acetaminophen (PERCOCET) 5-325 mg per tablet, Take 1 tablet by mouth daily as needed for moderate pain 1/d prn pain Max Daily Amount: 1 tablet, Disp: 30 tablet, Rfl: 0    potassium chloride (KLOR-CON) 8 MEQ tablet, Take 1 tablet (8 mEq total) by mouth daily, Disp: 30 tablet, Rfl: 11    The following portions of the patient's history were reviewed and updated as appropriate: allergies, current medications, past family history, past medical history, past social history, past surgical history and problem list     Review of Systems   Constitutional: Negative for appetite change, fatigue, fever and unexpected weight change  HENT: Negative for rhinorrhea, sinus pressure, sinus pain, sneezing and sore throat  Eyes: Negative for visual disturbance  Respiratory: Negative for cough, chest tightness, shortness of breath and wheezing  Cardiovascular: Negative for chest pain, palpitations and leg swelling  Gastrointestinal: Negative for abdominal distention, abdominal pain, blood in stool, constipation, diarrhea, nausea and vomiting  Endocrine: Negative for polydipsia and polyuria  Genitourinary: Negative for decreased urine volume, difficulty urinating, dysuria, hematuria and urgency  Musculoskeletal: Negative for arthralgias, back pain, joint swelling and neck pain  Skin: Negative for rash  Allergic/Immunologic: Negative for environmental allergies  Neurological: Positive for weakness  Negative for tremors, light-headedness, numbness and headaches     Hematological: Does not bruise/bleed easily  Psychiatric/Behavioral: Negative for agitation, behavioral problems, confusion and dysphoric mood  The patient is not nervous/anxious            Family History   Problem Relation Age of Onset   [place] Nine Cancer Mother     Dementia Mother     Cancer Father        Past Medical History:   Diagnosis Date    Arthritis     Hypertension     Obesity        Past Surgical History:   Procedure Laterality Date    CERVICAL DISCECTOMY      KNEE SURGERY      SPINE SURGERY         Social History     Socioeconomic History    Marital status:      Spouse name: None    Number of children: None    Years of education: None    Highest education level: None   Occupational History    Occupation: retired   Social Needs    Financial resource strain: None    Food insecurity:     Worry: None     Inability: None    Transportation needs:     Medical: None     Non-medical: None   Tobacco Use    Smoking status: Former Smoker    Smokeless tobacco: Former User    Tobacco comment: quit 10 years ago pipe, NEVER A SMOKER AS PER [employer]GEN   Substance and Sexual Activity    Alcohol use: No    Drug use: No    Sexual activity: Never     Partners: Female     Birth control/protection: Abstinence   Lifestyle    Physical activity:     Days per week: None     Minutes per session: None    Stress: None   Relationships    Social connections:     Talks on phone: None     Gets together: None     Attends [employer] service: None     Active member of club or organization: None     Attends meetings of clubs or organizations: None     Relationship status: None    Intimate partner violence:     Fear of current or ex partner: None     Emotionally abused: None     Physically abused: None     Forced sexual activity: None   Other Topics Concern    None   Social History Narrative    Pt states he has about 2-3 cups of cold Ice Tea daily       Allergies   Allergen Reactions    Ciprofloxacin GI Intolerance    Cephalexin  Oxybutynin GI Intolerance    Solifenacin Dizziness    Tiotropium Bromide Monohydrate GI Intolerance         Objective:      Pulse 76   Temp 98 9 °F (37 2 °C)   Resp 17   Ht 5' 10" (1 778 m)   Wt 122 kg (268 lb)   SpO2 95%   BMI 38 45 kg/m²        Physical Exam   Constitutional: He is oriented to person, place, and time  He appears well-developed and well-nourished  No distress  Severely obese   HENT:   Head: Normocephalic and atraumatic  Nose: Nose normal    Mouth/Throat: Oropharynx is clear and moist  No oropharyngeal exudate  Eyes: Pupils are equal, round, and reactive to light  Conjunctivae and EOM are normal  No scleral icterus  Neck: Normal range of motion  Neck supple  No JVD present  No tracheal deviation present  No thyromegaly present  Cardiovascular: Normal rate, regular rhythm and normal heart sounds  Exam reveals no gallop and no friction rub  No murmur heard  Pulmonary/Chest: Effort normal  No respiratory distress  He has no wheezes  He has no rales  He exhibits no tenderness  Abdominal: Soft  Bowel sounds are normal  He exhibits no distension and no mass  There is no tenderness  There is no rebound and no guarding  Musculoskeletal: Normal range of motion  He exhibits no edema or deformity  Lymphadenopathy:     He has no cervical adenopathy  Neurological: He is alert and oriented to person, place, and time  No cranial nerve deficit  Coordination normal    Skin: Skin is warm and dry  No rash noted  Psychiatric: He has a normal mood and affect   His behavior is normal  Judgment and thought content normal

## 2019-03-21 NOTE — TELEPHONE ENCOUNTER
Pts labs mailed and medication [facility] was out of the office

## 2019-05-15 ENCOUNTER — OFFICE VISIT (OUTPATIENT)
Dept: UROLOGY | Facility: MEDICAL CENTER | Age: 69
End: 2019-05-15
Payer: MEDICARE

## 2019-05-15 ENCOUNTER — TELEPHONE (OUTPATIENT)
Dept: UROLOGY | Facility: MEDICAL CENTER | Age: 69
End: 2019-05-15

## 2019-05-15 VITALS
DIASTOLIC BLOOD PRESSURE: 72 MMHG | HEIGHT: 70 IN | WEIGHT: 267 LBS | SYSTOLIC BLOOD PRESSURE: 110 MMHG | HEART RATE: 95 BPM | BODY MASS INDEX: 38.22 KG/M2

## 2019-05-15 DIAGNOSIS — R39.15 URGENCY OF URINATION: ICD-10-CM

## 2019-05-15 DIAGNOSIS — N40.1 BPH WITH OBSTRUCTION/LOWER URINARY TRACT SYMPTOMS: Primary | ICD-10-CM

## 2019-05-15 DIAGNOSIS — N13.8 BPH WITH OBSTRUCTION/LOWER URINARY TRACT SYMPTOMS: Primary | ICD-10-CM

## 2019-05-15 PROCEDURE — 99214 OFFICE O/P EST MOD 30 MIN: CPT | Performed by: UROLOGY

## 2019-05-15 PROCEDURE — 81003 URINALYSIS AUTO W/O SCOPE: CPT | Performed by: UROLOGY

## 2019-05-15 RX ORDER — ASPIRIN 81 MG/1
81 TABLET ORAL DAILY
COMMUNITY

## 2019-05-16 DIAGNOSIS — N40.1 ENLARGED PROSTATE WITH URINARY OBSTRUCTION: Primary | ICD-10-CM

## 2019-05-16 DIAGNOSIS — N13.8 ENLARGED PROSTATE WITH URINARY OBSTRUCTION: Primary | ICD-10-CM

## 2019-06-10 DIAGNOSIS — E87.6 HYPOKALEMIA: ICD-10-CM

## 2019-06-10 DIAGNOSIS — K29.60 OTHER GASTRITIS WITHOUT HEMORRHAGE, UNSPECIFIED CHRONICITY: ICD-10-CM

## 2019-06-11 ENCOUNTER — TELEPHONE (OUTPATIENT)
Dept: FAMILY MEDICINE CLINIC | Facility: CLINIC | Age: 69
End: 2019-06-11

## 2019-06-11 ENCOUNTER — APPOINTMENT (OUTPATIENT)
Dept: LAB | Age: 69
End: 2019-06-11
Payer: MEDICARE

## 2019-06-11 DIAGNOSIS — K83.1 CHOLESTASIS: ICD-10-CM

## 2019-06-11 DIAGNOSIS — E78.00 PURE HYPERCHOLESTEROLEMIA: ICD-10-CM

## 2019-06-11 DIAGNOSIS — Z00.00 MEDICARE ANNUAL WELLNESS VISIT, SUBSEQUENT: ICD-10-CM

## 2019-06-11 DIAGNOSIS — R73.03 PRE-DIABETES: ICD-10-CM

## 2019-06-11 DIAGNOSIS — I25.10 CORONARY ARTERY DISEASE INVOLVING NATIVE CORONARY ARTERY OF NATIVE HEART WITHOUT ANGINA PECTORIS: ICD-10-CM

## 2019-06-11 LAB
ALBUMIN SERPL BCP-MCNC: 3.8 G/DL (ref 3.5–5)
ALP SERPL-CCNC: 133 U/L (ref 46–116)
ALT SERPL W P-5'-P-CCNC: 28 U/L (ref 12–78)
ANION GAP SERPL CALCULATED.3IONS-SCNC: 5 MMOL/L (ref 4–13)
AST SERPL W P-5'-P-CCNC: 18 U/L (ref 5–45)
BASOPHILS # BLD AUTO: 0.04 THOUSANDS/ΜL (ref 0–0.1)
BASOPHILS NFR BLD AUTO: 1 % (ref 0–1)
BILIRUB SERPL-MCNC: 1.01 MG/DL (ref 0.2–1)
BUN SERPL-MCNC: 27 MG/DL (ref 5–25)
CALCIUM SERPL-MCNC: 9.1 MG/DL (ref 8.3–10.1)
CHLORIDE SERPL-SCNC: 108 MMOL/L (ref 100–108)
CO2 SERPL-SCNC: 29 MMOL/L (ref 21–32)
CREAT SERPL-MCNC: 0.94 MG/DL (ref 0.6–1.3)
EOSINOPHIL # BLD AUTO: 0.26 THOUSAND/ΜL (ref 0–0.61)
EOSINOPHIL NFR BLD AUTO: 3 % (ref 0–6)
ERYTHROCYTE [DISTWIDTH] IN BLOOD BY AUTOMATED COUNT: 14 % (ref 11.6–15.1)
GFR SERPL CREATININE-BSD FRML MDRD: 82 ML/MIN/1.73SQ M
GGT SERPL-CCNC: 19 U/L (ref 5–85)
GLUCOSE P FAST SERPL-MCNC: 93 MG/DL (ref 65–99)
HCT VFR BLD AUTO: 44.9 % (ref 36.5–49.3)
HGB BLD-MCNC: 14.6 G/DL (ref 12–17)
IMM GRANULOCYTES # BLD AUTO: 0.02 THOUSAND/UL (ref 0–0.2)
IMM GRANULOCYTES NFR BLD AUTO: 0 % (ref 0–2)
LDLC SERPL DIRECT ASSAY-MCNC: 82 MG/DL (ref 0–100)
LYMPHOCYTES # BLD AUTO: 2.13 THOUSANDS/ΜL (ref 0.6–4.47)
LYMPHOCYTES NFR BLD AUTO: 26 % (ref 14–44)
MCH RBC QN AUTO: 31.9 PG (ref 26.8–34.3)
MCHC RBC AUTO-ENTMCNC: 32.5 G/DL (ref 31.4–37.4)
MCV RBC AUTO: 98 FL (ref 82–98)
MONOCYTES # BLD AUTO: 0.71 THOUSAND/ΜL (ref 0.17–1.22)
MONOCYTES NFR BLD AUTO: 9 % (ref 4–12)
NEUTROPHILS # BLD AUTO: 5.14 THOUSANDS/ΜL (ref 1.85–7.62)
NEUTS SEG NFR BLD AUTO: 61 % (ref 43–75)
NRBC BLD AUTO-RTO: 0 /100 WBCS
PLATELET # BLD AUTO: 181 THOUSANDS/UL (ref 149–390)
PMV BLD AUTO: 10.5 FL (ref 8.9–12.7)
POTASSIUM SERPL-SCNC: 4.2 MMOL/L (ref 3.5–5.3)
PROT SERPL-MCNC: 7.5 G/DL (ref 6.4–8.2)
RBC # BLD AUTO: 4.57 MILLION/UL (ref 3.88–5.62)
SODIUM SERPL-SCNC: 142 MMOL/L (ref 136–145)
WBC # BLD AUTO: 8.3 THOUSAND/UL (ref 4.31–10.16)

## 2019-06-11 PROCEDURE — 82977 ASSAY OF GGT: CPT

## 2019-06-11 PROCEDURE — 36415 COLL VENOUS BLD VENIPUNCTURE: CPT

## 2019-06-11 PROCEDURE — 85025 COMPLETE CBC W/AUTO DIFF WBC: CPT

## 2019-06-11 PROCEDURE — 80053 COMPREHEN METABOLIC PANEL: CPT

## 2019-06-11 PROCEDURE — 83721 ASSAY OF BLOOD LIPOPROTEIN: CPT

## 2019-06-11 RX ORDER — FAMOTIDINE 40 MG/1
40 TABLET, FILM COATED ORAL DAILY
Qty: 30 TABLET | Refills: 11 | Status: SHIPPED | OUTPATIENT
Start: 2019-06-11 | End: 2019-12-09 | Stop reason: SDUPTHER

## 2019-06-11 RX ORDER — POTASSIUM CHLORIDE 600 MG/1
8 TABLET, FILM COATED, EXTENDED RELEASE ORAL DAILY
Qty: 90 TABLET | Refills: 1 | Status: SHIPPED | OUTPATIENT
Start: 2019-06-11 | End: 2019-12-08 | Stop reason: SDUPTHER

## 2019-06-18 ENCOUNTER — OFFICE VISIT (OUTPATIENT)
Dept: FAMILY MEDICINE CLINIC | Facility: CLINIC | Age: 69
End: 2019-06-18
Payer: MEDICARE

## 2019-06-18 VITALS
BODY MASS INDEX: 36.36 KG/M2 | DIASTOLIC BLOOD PRESSURE: 80 MMHG | SYSTOLIC BLOOD PRESSURE: 126 MMHG | OXYGEN SATURATION: 98 % | WEIGHT: 254 LBS | HEART RATE: 74 BPM | RESPIRATION RATE: 17 BRPM | TEMPERATURE: 98.9 F | HEIGHT: 70 IN

## 2019-06-18 DIAGNOSIS — I10 HYPERTENSION, UNSPECIFIED TYPE: ICD-10-CM

## 2019-06-18 DIAGNOSIS — E78.00 PURE HYPERCHOLESTEROLEMIA: ICD-10-CM

## 2019-06-18 DIAGNOSIS — E78.2 MIXED HYPERLIPIDEMIA: ICD-10-CM

## 2019-06-18 DIAGNOSIS — G62.9 NEUROPATHY: ICD-10-CM

## 2019-06-18 DIAGNOSIS — G60.9 IDIOPATHIC PERIPHERAL NEUROPATHY: ICD-10-CM

## 2019-06-18 DIAGNOSIS — E87.6 HYPOKALEMIA: ICD-10-CM

## 2019-06-18 DIAGNOSIS — E78.5 HYPERLIPIDEMIA, UNSPECIFIED HYPERLIPIDEMIA TYPE: Primary | ICD-10-CM

## 2019-06-18 DIAGNOSIS — R73.9 HYPERGLYCEMIA: ICD-10-CM

## 2019-06-18 DIAGNOSIS — E66.01 MORBID (SEVERE) OBESITY DUE TO EXCESS CALORIES (HCC): ICD-10-CM

## 2019-06-18 DIAGNOSIS — I25.10 CORONARY ARTERY DISEASE INVOLVING NATIVE CORONARY ARTERY OF NATIVE HEART WITHOUT ANGINA PECTORIS: ICD-10-CM

## 2019-06-18 DIAGNOSIS — R73.03 PRE-DIABETES: ICD-10-CM

## 2019-06-18 PROCEDURE — 99214 OFFICE O/P EST MOD 30 MIN: CPT | Performed by: INTERNAL MEDICINE

## 2019-06-18 RX ORDER — OXYCODONE HYDROCHLORIDE AND ACETAMINOPHEN 5; 325 MG/1; MG/1
1 TABLET ORAL DAILY PRN
Qty: 30 TABLET | Refills: 0 | Status: SHIPPED | OUTPATIENT
Start: 2019-06-18 | End: 2019-06-18 | Stop reason: SDUPTHER

## 2019-06-18 RX ORDER — OXYCODONE HYDROCHLORIDE AND ACETAMINOPHEN 5; 325 MG/1; MG/1
1 TABLET ORAL DAILY PRN
Qty: 30 TABLET | Refills: 0 | Status: SHIPPED | OUTPATIENT
Start: 2019-06-18 | End: 2019-09-24 | Stop reason: SDUPTHER

## 2019-09-10 ENCOUNTER — APPOINTMENT (OUTPATIENT)
Dept: LAB | Age: 69
End: 2019-09-10
Payer: MEDICARE

## 2019-09-10 DIAGNOSIS — E78.00 PURE HYPERCHOLESTEROLEMIA: ICD-10-CM

## 2019-09-10 DIAGNOSIS — I25.10 CORONARY ARTERY DISEASE INVOLVING NATIVE CORONARY ARTERY OF NATIVE HEART WITHOUT ANGINA PECTORIS: ICD-10-CM

## 2019-09-10 DIAGNOSIS — E78.2 MIXED HYPERLIPIDEMIA: ICD-10-CM

## 2019-09-10 DIAGNOSIS — R73.03 PRE-DIABETES: ICD-10-CM

## 2019-09-10 DIAGNOSIS — G60.9 IDIOPATHIC PERIPHERAL NEUROPATHY: ICD-10-CM

## 2019-09-10 DIAGNOSIS — I10 HYPERTENSION, UNSPECIFIED TYPE: ICD-10-CM

## 2019-09-10 DIAGNOSIS — E78.5 HYPERLIPIDEMIA, UNSPECIFIED HYPERLIPIDEMIA TYPE: ICD-10-CM

## 2019-09-10 DIAGNOSIS — E87.6 HYPOKALEMIA: ICD-10-CM

## 2019-09-10 LAB
ALBUMIN SERPL BCP-MCNC: 4.4 G/DL (ref 3.5–5)
ALP SERPL-CCNC: 134 U/L (ref 46–116)
ALT SERPL W P-5'-P-CCNC: 31 U/L (ref 12–78)
ANION GAP SERPL CALCULATED.3IONS-SCNC: 7 MMOL/L (ref 4–13)
AST SERPL W P-5'-P-CCNC: 19 U/L (ref 5–45)
BASOPHILS # BLD AUTO: 0.04 THOUSANDS/ΜL (ref 0–0.1)
BASOPHILS NFR BLD AUTO: 1 % (ref 0–1)
BILIRUB SERPL-MCNC: 1.12 MG/DL (ref 0.2–1)
BUN SERPL-MCNC: 25 MG/DL (ref 5–25)
CALCIUM SERPL-MCNC: 9.7 MG/DL (ref 8.3–10.1)
CHLORIDE SERPL-SCNC: 106 MMOL/L (ref 100–108)
CO2 SERPL-SCNC: 30 MMOL/L (ref 21–32)
CREAT SERPL-MCNC: 0.98 MG/DL (ref 0.6–1.3)
EOSINOPHIL # BLD AUTO: 0.29 THOUSAND/ΜL (ref 0–0.61)
EOSINOPHIL NFR BLD AUTO: 3 % (ref 0–6)
ERYTHROCYTE [DISTWIDTH] IN BLOOD BY AUTOMATED COUNT: 13.8 % (ref 11.6–15.1)
EST. AVERAGE GLUCOSE BLD GHB EST-MCNC: 114 MG/DL
GFR SERPL CREATININE-BSD FRML MDRD: 78 ML/MIN/1.73SQ M
GLUCOSE P FAST SERPL-MCNC: 100 MG/DL (ref 65–99)
HBA1C MFR BLD: 5.6 % (ref 4.2–6.3)
HCT VFR BLD AUTO: 47.2 % (ref 36.5–49.3)
HGB BLD-MCNC: 14.8 G/DL (ref 12–17)
IMM GRANULOCYTES # BLD AUTO: 0.03 THOUSAND/UL (ref 0–0.2)
IMM GRANULOCYTES NFR BLD AUTO: 0 % (ref 0–2)
LYMPHOCYTES # BLD AUTO: 2.26 THOUSANDS/ΜL (ref 0.6–4.47)
LYMPHOCYTES NFR BLD AUTO: 27 % (ref 14–44)
MCH RBC QN AUTO: 31 PG (ref 26.8–34.3)
MCHC RBC AUTO-ENTMCNC: 31.4 G/DL (ref 31.4–37.4)
MCV RBC AUTO: 99 FL (ref 82–98)
MONOCYTES # BLD AUTO: 0.73 THOUSAND/ΜL (ref 0.17–1.22)
MONOCYTES NFR BLD AUTO: 9 % (ref 4–12)
NEUTROPHILS # BLD AUTO: 5.11 THOUSANDS/ΜL (ref 1.85–7.62)
NEUTS SEG NFR BLD AUTO: 60 % (ref 43–75)
NRBC BLD AUTO-RTO: 0 /100 WBCS
PLATELET # BLD AUTO: 197 THOUSANDS/UL (ref 149–390)
PMV BLD AUTO: 10.4 FL (ref 8.9–12.7)
POTASSIUM SERPL-SCNC: 4.2 MMOL/L (ref 3.5–5.3)
PROT SERPL-MCNC: 7.8 G/DL (ref 6.4–8.2)
RBC # BLD AUTO: 4.77 MILLION/UL (ref 3.88–5.62)
SODIUM SERPL-SCNC: 143 MMOL/L (ref 136–145)
WBC # BLD AUTO: 8.46 THOUSAND/UL (ref 4.31–10.16)

## 2019-09-10 PROCEDURE — 80053 COMPREHEN METABOLIC PANEL: CPT

## 2019-09-10 PROCEDURE — 85025 COMPLETE CBC W/AUTO DIFF WBC: CPT

## 2019-09-10 PROCEDURE — 83036 HEMOGLOBIN GLYCOSYLATED A1C: CPT

## 2019-09-10 PROCEDURE — 36415 COLL VENOUS BLD VENIPUNCTURE: CPT

## 2019-09-24 ENCOUNTER — OFFICE VISIT (OUTPATIENT)
Dept: FAMILY MEDICINE CLINIC | Facility: CLINIC | Age: 69
End: 2019-09-24
Payer: MEDICARE

## 2019-09-24 VITALS
RESPIRATION RATE: 16 BRPM | BODY MASS INDEX: 35.5 KG/M2 | OXYGEN SATURATION: 98 % | SYSTOLIC BLOOD PRESSURE: 122 MMHG | HEIGHT: 70 IN | WEIGHT: 248 LBS | HEART RATE: 70 BPM | TEMPERATURE: 98.4 F | DIASTOLIC BLOOD PRESSURE: 70 MMHG

## 2019-09-24 DIAGNOSIS — E87.6 HYPOKALEMIA: ICD-10-CM

## 2019-09-24 DIAGNOSIS — R73.9 HYPERGLYCEMIA: ICD-10-CM

## 2019-09-24 DIAGNOSIS — E66.01 MORBID (SEVERE) OBESITY DUE TO EXCESS CALORIES (HCC): ICD-10-CM

## 2019-09-24 DIAGNOSIS — G62.9 NEUROPATHY: ICD-10-CM

## 2019-09-24 DIAGNOSIS — E78.5 HYPERLIPIDEMIA, UNSPECIFIED HYPERLIPIDEMIA TYPE: Primary | ICD-10-CM

## 2019-09-24 PROCEDURE — 99214 OFFICE O/P EST MOD 30 MIN: CPT | Performed by: INTERNAL MEDICINE

## 2019-09-24 RX ORDER — OXYCODONE HYDROCHLORIDE AND ACETAMINOPHEN 5; 325 MG/1; MG/1
1 TABLET ORAL DAILY PRN
Qty: 30 TABLET | Refills: 0 | Status: SHIPPED | OUTPATIENT
Start: 2019-09-24 | End: 2019-12-26

## 2019-09-24 NOTE — PROGRESS NOTES
Assessment/Plan:  Patient has purposely lost over 60 lb over greater than 1 year I reducing calorie intake such as 2 L of [employer] daily plus other high-calorie foods daily  He feels much improved but states that is walking still difficult due to his neuropathy in for which he takes gabapentin 400 mg daily  We will recheck his blood sugar and cholesterol and lipids for the next visit  Oxycodone/acetaminophen was ordered for the next visit as well as microalbumin creatinine ratio and alkaline phosphatase isoenzymes  Diet reviewed  Lifestyle modifications reviewed  Medications reviewed and ordered  Laboratory tests and studies reviewed and ordered  All patient's questions answered to patient satisfaction  Chief Complaint   Patient presents with    Hyperlipidemia    Flu Vaccine     Pt states he does not want flu today         Diagnoses and all orders for this visit:    Hyperlipidemia, unspecified hyperlipidemia type  -     Alkaline phosphatase, isoenzymes; Future  -     CBC and differential; Future  -     Comprehensive metabolic panel; Future  -     LDL cholesterol, direct; Future  -     Triglycerides; Future  -     HDL cholesterol; Future    Morbid (severe) obesity due to excess calories (HCC)  -     Alkaline phosphatase, isoenzymes; Future  -     CBC and differential; Future  -     Comprehensive metabolic panel; Future  -     LDL cholesterol, direct; Future  -     Triglycerides; Future  -     HDL cholesterol; Future    Hyperglycemia  -     Alkaline phosphatase, isoenzymes; Future  -     CBC and differential; Future  -     Comprehensive metabolic panel; Future  -     LDL cholesterol, direct; Future  -     Triglycerides; Future  -     HDL cholesterol; Future  -     Microalbumin / creatinine urine ratio; Future  -     Urinalysis with reflex to microscopic; Future    Hypokalemia  -     Alkaline phosphatase, isoenzymes; Future  -     CBC and differential; Future  -     Comprehensive metabolic panel;  Future  - LDL cholesterol, direct; Future  -     Triglycerides; Future  -     HDL cholesterol; Future    Neuropathy  -     oxyCODONE-acetaminophen (PERCOCET) 5-325 mg per tablet; Take 1 tablet by mouth daily as needed for moderate pain 1/d prn painMax Daily Amount: 1 tablet  -     Alkaline phosphatase, isoenzymes; Future  -     CBC and differential; Future  -     Comprehensive metabolic panel; Future  -     LDL cholesterol, direct; Future  -     Triglycerides; Future  -     HDL cholesterol; Future        Subjective: This 58-year-old morbidly obese male is had a sustained weight loss of 60 lb over greater than 1 year period  He has done this by decreasing process carbohydrates in the form of sodas and other sweets  Feels much improved although his neuropathy is still very bothersome  He really takes narcotics  He takes gabapentin and 1 650 mg arthritis strength Tylenol daily  It is noted that his alk phos is slightly elevated and this will be repeated with isoenzymes for the next visit  Urine microalbumin will also be repeated  Patient ID: Anuradha Ivan is a 71 y o  male          Current Outpatient Medications:     Acetaminophen (TYLENOL ARTHRITIS PAIN PO), Take by mouth daily, Disp: , Rfl:     amoxicillin (AMOXIL) 500 mg capsule, Only dental work, Disp: , Rfl:     aspirin (ECOTRIN LOW STRENGTH) 81 mg EC tablet, Take 81 mg by mouth daily, Disp: , Rfl:     atorvastatin (LIPITOR) 40 mg tablet, Take 1 tablet (40 mg total) by mouth daily, Disp: 90 tablet, Rfl: 3    Cholecalciferol (VITAMIN D-3) 5000 units TABS, take 1 tablet daily, Disp: , Rfl:     famotidine (PEPCID) 40 MG tablet, Take 1 tablet (40 mg total) by mouth daily, Disp: 30 tablet, Rfl: 11    gabapentin (NEURONTIN) 100 mg capsule, Take 4 tabs daily (Patient taking differently: 100 mg Take 4 tabs daily), Disp: 360 capsule, Rfl: 3    lisinopril (ZESTRIL) 40 mg tablet, Take 1 tablet (40 mg total) by mouth daily, Disp: 90 tablet, Rfl: 3   oxyCODONE-acetaminophen (PERCOCET) 5-325 mg per tablet, Take 1 tablet by mouth daily as needed for moderate pain 1/d prn painMax Daily Amount: 1 tablet, Disp: 30 tablet, Rfl: 0    potassium chloride (KLOR-CON) 8 MEQ tablet, Take 1 tablet (8 mEq total) by mouth daily, Disp: 90 tablet, Rfl: 1    The following portions of the patient's history were reviewed and updated as appropriate: allergies, current medications, past family history, past medical history, past social history, past surgical history and problem list     Review of Systems   Constitutional: Negative for appetite change, fatigue, fever and unexpected weight change  HENT: Negative for rhinorrhea, sinus pressure, sinus pain, sneezing and sore throat  Eyes: Negative for visual disturbance  Respiratory: Negative for cough, chest tightness, shortness of breath and wheezing  Cardiovascular: Negative for chest pain, palpitations and leg swelling  Gastrointestinal: Negative for abdominal distention, abdominal pain, blood in stool, constipation, diarrhea, nausea and vomiting  Endocrine: Negative for polydipsia and polyuria  Genitourinary: Negative for decreased urine volume, difficulty urinating, dysuria, hematuria and urgency  Musculoskeletal: Negative for arthralgias, back pain, joint swelling and neck pain  Skin: Negative for rash  Allergic/Immunologic: Negative for environmental allergies  Neurological: Negative for tremors, weakness, light-headedness, numbness and headaches  Hematological: Does not bruise/bleed easily  Psychiatric/Behavioral: Negative for agitation, behavioral problems, confusion and dysphoric mood  The patient is not nervous/anxious            Family History   Problem Relation Age of Onset   Shital Debi Cancer Mother     Dementia Mother     Cancer Father        Past Medical History:   Diagnosis Date    Arthritis     Hypertension     Obesity        Past Surgical History:   Procedure Laterality Date    CERVICAL DISCECTOMY      KNEE SURGERY      SPINE SURGERY         Social History     Socioeconomic History    Marital status:      Spouse name: None    Number of children: None    Years of education: None    Highest education level: None   Occupational History    Occupation: retired   Social Needs    Financial resource strain: None    Food insecurity:     Worry: None     Inability: None    Transportation needs:     Medical: None     Non-medical: None   Tobacco Use    Smoking status: Former Smoker    Smokeless tobacco: Former User    Tobacco comment: quit 10 years ago pipe, NEVER A SMOKER AS PER [employer]   Substance and Sexual Activity    Alcohol use: No    Drug use: No    Sexual activity: Never     Partners: Female     Birth control/protection: Abstinence   Lifestyle    Physical activity:     Days per week: None     Minutes per session: None    Stress: None   Relationships    Social connections:     Talks on phone: None     Gets together: None     Attends Anglican service: None     Active member of club or organization: None     Attends meetings of clubs or organizations: None     Relationship status: None    Intimate partner violence:     Fear of current or ex partner: None     Emotionally abused: None     Physically abused: None     Forced sexual activity: None   Other Topics Concern    None   Social History Narrative    Pt states he has about 2-3 cups of cold Ice Tea daily       Allergies   Allergen Reactions    Ciprofloxacin GI Intolerance    Cephalexin     Oxybutynin GI Intolerance    Solifenacin Dizziness    Tiotropium Bromide Monohydrate GI Intolerance       BMI Counseling: Body mass index is 35 58 kg/m²  Discussed the patient's BMI with him  The BMI is above normal  Nutrition recommendations include reducing portion sizes        Objective:    /70   Pulse 70   Temp 98 4 °F (36 9 °C)   Resp 16   Ht 5' 10" (1 778 m)   Wt 112 kg (248 lb)   SpO2 98%   BMI 35 58 kg/m² Physical Exam   Constitutional: He is oriented to person, place, and time  He appears well-developed and well-nourished  No distress  HENT:   Head: Normocephalic and atraumatic  Nose: Nose normal    Mouth/Throat: Oropharynx is clear and moist  No oropharyngeal exudate  Eyes: Pupils are equal, round, and reactive to light  Conjunctivae and EOM are normal  No scleral icterus  Neck: Normal range of motion  Neck supple  No JVD present  No tracheal deviation present  No thyromegaly present  Cardiovascular: Normal rate, regular rhythm and normal heart sounds  Exam reveals no gallop and no friction rub  No murmur heard  Pulmonary/Chest: Effort normal  No respiratory distress  He has no wheezes  He has no rales  He exhibits no tenderness  Abdominal: Soft  Bowel sounds are normal  He exhibits no distension and no mass  There is no tenderness  There is no rebound and no guarding  Musculoskeletal: Normal range of motion  He exhibits no edema or deformity  Lymphadenopathy:     He has no cervical adenopathy  Neurological: He is alert and oriented to person, place, and time  No cranial nerve deficit  Coordination normal    Skin: Skin is warm and dry  No rash noted  Psychiatric: He has a normal mood and affect   His behavior is normal  Judgment and thought content normal

## 2019-10-07 ENCOUNTER — TELEPHONE (OUTPATIENT)
Dept: FAMILY MEDICINE CLINIC | Facility: CLINIC | Age: 69
End: 2019-10-07

## 2019-10-27 DIAGNOSIS — E78.5 HYPERLIPIDEMIA, UNSPECIFIED HYPERLIPIDEMIA TYPE: ICD-10-CM

## 2019-10-29 DIAGNOSIS — E78.5 HYPERLIPIDEMIA, UNSPECIFIED HYPERLIPIDEMIA TYPE: ICD-10-CM

## 2019-10-29 RX ORDER — ATORVASTATIN CALCIUM 40 MG/1
TABLET, FILM COATED ORAL
Qty: 90 TABLET | Refills: 2 | Status: SHIPPED | OUTPATIENT
Start: 2019-10-29 | End: 2019-10-29 | Stop reason: SDUPTHER

## 2019-10-29 RX ORDER — ATORVASTATIN CALCIUM 40 MG/1
40 TABLET, FILM COATED ORAL DAILY
Qty: 90 TABLET | Refills: 1 | Status: SHIPPED | OUTPATIENT
Start: 2019-10-29 | End: 2020-11-17

## 2019-11-19 ENCOUNTER — APPOINTMENT (OUTPATIENT)
Dept: LAB | Age: 69
End: 2019-11-19
Payer: MEDICARE

## 2019-11-19 DIAGNOSIS — E87.6 HYPOKALEMIA: ICD-10-CM

## 2019-11-19 DIAGNOSIS — G62.9 NEUROPATHY: ICD-10-CM

## 2019-11-19 DIAGNOSIS — E66.01 MORBID (SEVERE) OBESITY DUE TO EXCESS CALORIES (HCC): ICD-10-CM

## 2019-11-19 DIAGNOSIS — R73.9 HYPERGLYCEMIA: ICD-10-CM

## 2019-11-19 DIAGNOSIS — E78.5 HYPERLIPIDEMIA, UNSPECIFIED HYPERLIPIDEMIA TYPE: ICD-10-CM

## 2019-11-19 LAB
ALBUMIN SERPL BCP-MCNC: 4.5 G/DL (ref 3.5–5)
ALP SERPL-CCNC: 136 U/L (ref 46–116)
ALT SERPL W P-5'-P-CCNC: 51 U/L (ref 12–78)
ANION GAP SERPL CALCULATED.3IONS-SCNC: 10 MMOL/L (ref 4–13)
AST SERPL W P-5'-P-CCNC: 26 U/L (ref 5–45)
BASOPHILS # BLD AUTO: 0.05 THOUSANDS/ΜL (ref 0–0.1)
BASOPHILS NFR BLD AUTO: 1 % (ref 0–1)
BILIRUB SERPL-MCNC: 0.76 MG/DL (ref 0.2–1)
BILIRUB UR QL STRIP: NEGATIVE
BUN SERPL-MCNC: 36 MG/DL (ref 5–25)
CALCIUM SERPL-MCNC: 9.4 MG/DL (ref 8.3–10.1)
CHLORIDE SERPL-SCNC: 110 MMOL/L (ref 100–108)
CLARITY UR: CLEAR
CO2 SERPL-SCNC: 25 MMOL/L (ref 21–32)
COLOR UR: YELLOW
CREAT SERPL-MCNC: 1.06 MG/DL (ref 0.6–1.3)
CREAT UR-MCNC: 211 MG/DL
EOSINOPHIL # BLD AUTO: 0.26 THOUSAND/ΜL (ref 0–0.61)
EOSINOPHIL NFR BLD AUTO: 3 % (ref 0–6)
ERYTHROCYTE [DISTWIDTH] IN BLOOD BY AUTOMATED COUNT: 13.8 % (ref 11.6–15.1)
GFR SERPL CREATININE-BSD FRML MDRD: 71 ML/MIN/1.73SQ M
GLUCOSE P FAST SERPL-MCNC: 108 MG/DL (ref 65–99)
GLUCOSE UR STRIP-MCNC: NEGATIVE MG/DL
HCT VFR BLD AUTO: 46.5 % (ref 36.5–49.3)
HDLC SERPL-MCNC: 52 MG/DL
HGB BLD-MCNC: 14.7 G/DL (ref 12–17)
HGB UR QL STRIP.AUTO: NEGATIVE
IMM GRANULOCYTES # BLD AUTO: 0.03 THOUSAND/UL (ref 0–0.2)
IMM GRANULOCYTES NFR BLD AUTO: 0 % (ref 0–2)
KETONES UR STRIP-MCNC: NEGATIVE MG/DL
LDLC SERPL DIRECT ASSAY-MCNC: 84 MG/DL (ref 0–100)
LEUKOCYTE ESTERASE UR QL STRIP: NEGATIVE
LYMPHOCYTES # BLD AUTO: 2.05 THOUSANDS/ΜL (ref 0.6–4.47)
LYMPHOCYTES NFR BLD AUTO: 23 % (ref 14–44)
MCH RBC QN AUTO: 31.3 PG (ref 26.8–34.3)
MCHC RBC AUTO-ENTMCNC: 31.6 G/DL (ref 31.4–37.4)
MCV RBC AUTO: 99 FL (ref 82–98)
MICROALBUMIN UR-MCNC: 12 MG/L (ref 0–20)
MICROALBUMIN/CREAT 24H UR: 6 MG/G CREATININE (ref 0–30)
MONOCYTES # BLD AUTO: 0.78 THOUSAND/ΜL (ref 0.17–1.22)
MONOCYTES NFR BLD AUTO: 9 % (ref 4–12)
NEUTROPHILS # BLD AUTO: 5.72 THOUSANDS/ΜL (ref 1.85–7.62)
NEUTS SEG NFR BLD AUTO: 64 % (ref 43–75)
NITRITE UR QL STRIP: NEGATIVE
NRBC BLD AUTO-RTO: 0 /100 WBCS
PH UR STRIP.AUTO: 5.5 [PH]
PLATELET # BLD AUTO: 195 THOUSANDS/UL (ref 149–390)
PMV BLD AUTO: 10.6 FL (ref 8.9–12.7)
POTASSIUM SERPL-SCNC: 4.3 MMOL/L (ref 3.5–5.3)
PROT SERPL-MCNC: 8.1 G/DL (ref 6.4–8.2)
PROT UR STRIP-MCNC: NEGATIVE MG/DL
RBC # BLD AUTO: 4.7 MILLION/UL (ref 3.88–5.62)
SODIUM SERPL-SCNC: 145 MMOL/L (ref 136–145)
SP GR UR STRIP.AUTO: 1.03 (ref 1–1.03)
TRIGL SERPL-MCNC: 104 MG/DL
UROBILINOGEN UR QL STRIP.AUTO: 1 E.U./DL
WBC # BLD AUTO: 8.89 THOUSAND/UL (ref 4.31–10.16)

## 2019-11-19 PROCEDURE — 83721 ASSAY OF BLOOD LIPOPROTEIN: CPT

## 2019-11-19 PROCEDURE — 81003 URINALYSIS AUTO W/O SCOPE: CPT

## 2019-11-19 PROCEDURE — 85025 COMPLETE CBC W/AUTO DIFF WBC: CPT

## 2019-11-19 PROCEDURE — 84478 ASSAY OF TRIGLYCERIDES: CPT

## 2019-11-19 PROCEDURE — 82043 UR ALBUMIN QUANTITATIVE: CPT

## 2019-11-19 PROCEDURE — 84080 ASSAY ALKALINE PHOSPHATASES: CPT

## 2019-11-19 PROCEDURE — 82570 ASSAY OF URINE CREATININE: CPT

## 2019-11-19 PROCEDURE — 80053 COMPREHEN METABOLIC PANEL: CPT

## 2019-11-19 PROCEDURE — 36415 COLL VENOUS BLD VENIPUNCTURE: CPT

## 2019-11-19 PROCEDURE — 83718 ASSAY OF LIPOPROTEIN: CPT

## 2019-11-19 PROCEDURE — 84075 ASSAY ALKALINE PHOSPHATASE: CPT

## 2019-11-21 LAB
ALP BONE CFR SERPL: 20 % (ref 12–68)
ALP INTEST CFR SERPL: 11 % (ref 0–18)
ALP LIVER CFR SERPL: 69 % (ref 13–88)
ALP SERPL-CCNC: 128 IU/L (ref 39–117)

## 2019-12-08 DIAGNOSIS — E87.6 HYPOKALEMIA: ICD-10-CM

## 2019-12-09 DIAGNOSIS — K29.60 OTHER GASTRITIS WITHOUT HEMORRHAGE, UNSPECIFIED CHRONICITY: ICD-10-CM

## 2019-12-10 RX ORDER — POTASSIUM CHLORIDE 600 MG/1
TABLET, FILM COATED, EXTENDED RELEASE ORAL
Qty: 90 TABLET | Refills: 0 | Status: SHIPPED | OUTPATIENT
Start: 2019-12-10 | End: 2020-03-11

## 2019-12-10 RX ORDER — FAMOTIDINE 40 MG/1
TABLET, FILM COATED ORAL
Qty: 90 TABLET | Refills: 0 | Status: SHIPPED | OUTPATIENT
Start: 2019-12-10 | End: 2020-01-07 | Stop reason: ALTCHOICE

## 2019-12-19 ENCOUNTER — TELEPHONE (OUTPATIENT)
Dept: OTHER | Facility: OTHER | Age: 69
End: 2019-12-19

## 2019-12-19 NOTE — TELEPHONE ENCOUNTER
The patient called to cancel his appointment today  "It is too cold for me to go out " The patient declined to re-schedule  He stated he would call back @ a later time to do the re-schedule

## 2019-12-26 ENCOUNTER — OFFICE VISIT (OUTPATIENT)
Dept: FAMILY MEDICINE CLINIC | Facility: CLINIC | Age: 69
End: 2019-12-26
Payer: MEDICARE

## 2019-12-26 VITALS
SYSTOLIC BLOOD PRESSURE: 124 MMHG | WEIGHT: 250 LBS | OXYGEN SATURATION: 98 % | BODY MASS INDEX: 35.87 KG/M2 | DIASTOLIC BLOOD PRESSURE: 72 MMHG | HEART RATE: 71 BPM | TEMPERATURE: 98.4 F

## 2019-12-26 DIAGNOSIS — K29.60 OTHER GASTRITIS WITHOUT HEMORRHAGE, UNSPECIFIED CHRONICITY: ICD-10-CM

## 2019-12-26 DIAGNOSIS — I10 BENIGN ESSENTIAL HYPERTENSION: ICD-10-CM

## 2019-12-26 DIAGNOSIS — T50.905A DRUG-INDUCED INTRAHEPATIC CHOLESTASIS: ICD-10-CM

## 2019-12-26 DIAGNOSIS — K83.8 DRUG-INDUCED INTRAHEPATIC CHOLESTASIS: ICD-10-CM

## 2019-12-26 DIAGNOSIS — E87.6 HYPOKALEMIA: ICD-10-CM

## 2019-12-26 DIAGNOSIS — R73.03 PRE-DIABETES: ICD-10-CM

## 2019-12-26 DIAGNOSIS — G62.9 NEUROPATHY: ICD-10-CM

## 2019-12-26 DIAGNOSIS — E78.5 HYPERLIPIDEMIA, UNSPECIFIED HYPERLIPIDEMIA TYPE: Primary | ICD-10-CM

## 2019-12-26 PROCEDURE — 99214 OFFICE O/P EST MOD 30 MIN: CPT | Performed by: INTERNAL MEDICINE

## 2019-12-26 RX ORDER — OXYCODONE HYDROCHLORIDE AND ACETAMINOPHEN 5; 325 MG/1; MG/1
1 TABLET ORAL EVERY 8 HOURS PRN
Qty: 30 TABLET | Refills: 0 | Status: SHIPPED | OUTPATIENT
Start: 2019-12-26 | End: 2020-06-02 | Stop reason: SDUPTHER

## 2019-12-26 NOTE — PROGRESS NOTES
Assessment/Plan:  Patient's oxycodone with Tylenol was renewed, 30 pills to last him at least 2 months her not longer  The patient months of very mild elevation of the alkaline phosphatase which appears to be liver in origin just barely above normal and stable  He takes 2 Tylenol pills daily which may account for this and was told that it is okay for for him to continue to take this because he monitors alkaline phosphatase but not to take more than this amount  Diet reviewed  Lifestyle modifications reviewed  Medications reviewed and ordered  Laboratory tests and studies reviewed and ordered  All patient's questions answered to patient satisfaction  Chief Complaint   Patient presents with    Hyperlipidemia     chronic    Results     review lab work    Hypertension     chronic         Diagnoses and all orders for this visit:    Hyperlipidemia, unspecified hyperlipidemia type    Hypokalemia  -     CBC and differential; Future  -     Comprehensive metabolic panel; Future  -     Gamma GT; Future    Other gastritis without hemorrhage, unspecified chronicity    Neuropathy  -     CBC and differential; Future  -     Comprehensive metabolic panel; Future  -     Gamma GT; Future  -     oxyCODONE-acetaminophen (PERCOCET) 5-325 mg per tablet; Take 1 tablet by mouth every 8 (eight) hours as needed for moderate pain 1/d prn painMax Daily Amount: 3 tablets    Benign essential hypertension    Drug-induced intrahepatic cholestasis  Comments:  Possible secondary to 650 mg of Tylenol daily  Will continue to monitor alkaline phosphatase which is barely above the normal range and likely from liver origi  Orders:  -     CBC and differential; Future  -     Comprehensive metabolic panel; Future  -     Gamma GT; Future  -     Hepatitis C antibody; Future    Pre-diabetes  -     Hemoglobin A1C; Future        Subjective: This 40-year-old male has history of chronic pain in his legs from neuropathy and radiculopathy    His prescription for oxycodone/Tylenol however takes this only on occasion approximately 2 or 3 times per week  He also takes 650 mg of Tylenol daily  His alkaline phosphatase is being monitored and it was 136 and has been stable over the past year and before that was in the 120s  He has been careful not to take more Tylenol than this  Patient ID: Cheryl Fink is a 71 y o  male  Current Outpatient Medications:     Acetaminophen (TYLENOL ARTHRITIS PAIN PO), Take by mouth daily, Disp: , Rfl:     amoxicillin (AMOXIL) 500 mg capsule, Only dental work, Disp: , Rfl:     aspirin (ECOTRIN LOW STRENGTH) 81 mg EC tablet, Take 81 mg by mouth daily, Disp: , Rfl:     atorvastatin (LIPITOR) 40 mg tablet, Take 1 tablet (40 mg total) by mouth daily, Disp: 90 tablet, Rfl: 1    Cholecalciferol (VITAMIN D-3) 5000 units TABS, take 1 tablet daily, Disp: , Rfl:     famotidine (PEPCID) 40 MG tablet, TAKE ONE TABLET BY MOUTH ONCE DAILY , Disp: 90 tablet, Rfl: 0    gabapentin (NEURONTIN) 100 mg capsule, Take 4 tabs daily (Patient taking differently: 100 mg Take 4 tabs daily), Disp: 360 capsule, Rfl: 3    lisinopril (ZESTRIL) 40 mg tablet, Take 1 tablet (40 mg total) by mouth daily, Disp: 90 tablet, Rfl: 3    oxyCODONE-acetaminophen (PERCOCET) 5-325 mg per tablet, Take 1 tablet by mouth every 8 (eight) hours as needed for moderate pain 1/d prn painMax Daily Amount: 3 tablets, Disp: 30 tablet, Rfl: 0    potassium chloride (KLOR-CON) 8 MEQ tablet, TAKE ONE TABLET BY MOUTH ONCE DAILY , Disp: 90 tablet, Rfl: 0    The following portions of the patient's history were reviewed and updated as appropriate: allergies, current medications, past family history, past medical history, past social history, past surgical history and problem list     Review of Systems   Constitutional: Negative for appetite change, fatigue, fever and unexpected weight change     HENT: Negative for rhinorrhea, sinus pressure, sinus pain, sneezing and sore throat  Eyes: Negative for visual disturbance  Respiratory: Negative for cough, chest tightness, shortness of breath and wheezing  Cardiovascular: Negative for chest pain, palpitations and leg swelling  Gastrointestinal: Negative for abdominal distention, abdominal pain, blood in stool, constipation, diarrhea, nausea and vomiting  Endocrine: Negative for polydipsia and polyuria  Genitourinary: Negative for decreased urine volume, difficulty urinating, dysuria, hematuria and urgency  Musculoskeletal: Negative for arthralgias, back pain, joint swelling and neck pain  Skin: Negative for rash  Allergic/Immunologic: Negative for environmental allergies  Neurological: Negative for tremors, weakness, light-headedness, numbness and headaches  Hematological: Does not bruise/bleed easily  Psychiatric/Behavioral: Negative for agitation, behavioral problems, confusion and dysphoric mood  The patient is not nervous/anxious            Family History   Problem Relation Age of Onset   Ridge Lombardi Cancer Mother     Dementia Mother    Ridge Lombardi Cancer Father        Past Medical History:   Diagnosis Date    Arthritis     Coronary artery disease     Hypertension     Obesity        Past Surgical History:   Procedure Laterality Date    CERVICAL DISCECTOMY      KNEE SURGERY      SPINE SURGERY         Social History     Socioeconomic History    Marital status:      Spouse name: None    Number of children: None    Years of education: None    Highest education level: None   Occupational History    Occupation: retired   Social Needs    Financial resource strain: None    Food insecurity:     Worry: None     Inability: None    Transportation needs:     Medical: None     Non-medical: None   Tobacco Use    Smoking status: Former Smoker    Smokeless tobacco: Former User    Tobacco comment: quit 10 years ago pipe, NEVER A SMOKER AS PER [employer]   Substance and Sexual Activity    Alcohol use: No    Drug use: No    Sexual activity: Never     Partners: Female     Birth control/protection: Abstinence   Lifestyle    Physical activity:     Days per week: None     Minutes per session: None    Stress: None   Relationships    Social connections:     Talks on phone: None     Gets together: None     Attends Caodaism service: None     Active member of club or organization: None     Attends meetings of clubs or organizations: None     Relationship status: None    Intimate partner violence:     Fear of current or ex partner: None     Emotionally abused: None     Physically abused: None     Forced sexual activity: None   Other Topics Concern    None   Social History Narrative    Pt states he has about 2-3 cups of cold Ice Tea daily       Allergies   Allergen Reactions    Ciprofloxacin GI Intolerance    Cephalexin     Oxybutynin GI Intolerance    Solifenacin Dizziness    Tiotropium Bromide Monohydrate GI Intolerance            Objective:    /72   Pulse 71   Temp 98 4 °F (36 9 °C)   Wt 113 kg (250 lb)   SpO2 98%   BMI 35 87 kg/m²        Physical Exam   Constitutional: He is oriented to person, place, and time  He appears well-developed and well-nourished  No distress  HENT:   Head: Normocephalic and atraumatic  Nose: Nose normal    Mouth/Throat: Oropharynx is clear and moist  No oropharyngeal exudate  Eyes: Pupils are equal, round, and reactive to light  Conjunctivae and EOM are normal  No scleral icterus  Neck: Normal range of motion  Neck supple  No JVD present  No tracheal deviation present  No thyromegaly present  Cardiovascular: Normal rate, regular rhythm and normal heart sounds  Exam reveals no gallop and no friction rub  No murmur heard  Pulmonary/Chest: Effort normal  No respiratory distress  He has no wheezes  He has no rales  He exhibits no tenderness  Abdominal: Soft  Bowel sounds are normal  He exhibits no distension and no mass  There is no tenderness   There is no rebound and no guarding  Musculoskeletal: Normal range of motion  He exhibits no edema or deformity  Lymphadenopathy:     He has no cervical adenopathy  Neurological: He is alert and oriented to person, place, and time  No cranial nerve deficit  Coordination normal    Skin: Skin is warm and dry  No rash noted  Psychiatric: He has a normal mood and affect   His behavior is normal  Judgment and thought content normal

## 2020-01-06 ENCOUNTER — TELEPHONE (OUTPATIENT)
Dept: FAMILY MEDICINE CLINIC | Facility: CLINIC | Age: 70
End: 2020-01-06

## 2020-01-06 DIAGNOSIS — K21.9 GASTROESOPHAGEAL REFLUX DISEASE WITHOUT ESOPHAGITIS: Primary | ICD-10-CM

## 2020-01-06 NOTE — TELEPHONE ENCOUNTER
Pt's pharmacy called to let you know that Pt's Pepcid is on backorder, can you please advise a different medication?

## 2020-01-07 RX ORDER — PANTOPRAZOLE SODIUM 40 MG/1
40 TABLET, DELAYED RELEASE ORAL
Qty: 90 TABLET | Refills: 3 | Status: SHIPPED | OUTPATIENT
Start: 2020-01-07 | End: 2021-02-08

## 2020-03-10 ENCOUNTER — APPOINTMENT (OUTPATIENT)
Dept: LAB | Age: 70
End: 2020-03-10
Payer: MEDICARE

## 2020-03-10 ENCOUNTER — TRANSCRIBE ORDERS (OUTPATIENT)
Dept: URGENT CARE | Age: 70
End: 2020-03-10

## 2020-03-10 DIAGNOSIS — R73.03 PRE-DIABETES: ICD-10-CM

## 2020-03-10 DIAGNOSIS — G62.9 NEUROPATHY: ICD-10-CM

## 2020-03-10 DIAGNOSIS — R26.9 ABNORMALITY OF GAIT: Primary | ICD-10-CM

## 2020-03-10 DIAGNOSIS — T50.905A DRUG-INDUCED INTRAHEPATIC CHOLESTASIS: ICD-10-CM

## 2020-03-10 DIAGNOSIS — G60.9 IDIOPATHIC PERIPHERAL NEUROPATHY: ICD-10-CM

## 2020-03-10 DIAGNOSIS — K83.8 DRUG-INDUCED INTRAHEPATIC CHOLESTASIS: ICD-10-CM

## 2020-03-10 DIAGNOSIS — R26.9 ABNORMALITY OF GAIT: ICD-10-CM

## 2020-03-10 DIAGNOSIS — E87.6 HYPOKALEMIA: ICD-10-CM

## 2020-03-10 LAB
ALBUMIN SERPL BCP-MCNC: 3.9 G/DL (ref 3.5–5)
ALP SERPL-CCNC: 135 U/L (ref 46–116)
ALT SERPL W P-5'-P-CCNC: 40 U/L (ref 12–78)
ANION GAP SERPL CALCULATED.3IONS-SCNC: 3 MMOL/L (ref 4–13)
AST SERPL W P-5'-P-CCNC: 19 U/L (ref 5–45)
BASOPHILS # BLD AUTO: 0.03 THOUSANDS/ΜL (ref 0–0.1)
BASOPHILS NFR BLD AUTO: 1 % (ref 0–1)
BILIRUB SERPL-MCNC: 0.81 MG/DL (ref 0.2–1)
BUN SERPL-MCNC: 31 MG/DL (ref 5–25)
CALCIUM SERPL-MCNC: 9.1 MG/DL (ref 8.3–10.1)
CHLORIDE SERPL-SCNC: 108 MMOL/L (ref 100–108)
CO2 SERPL-SCNC: 33 MMOL/L (ref 21–32)
CREAT SERPL-MCNC: 0.93 MG/DL (ref 0.6–1.3)
EOSINOPHIL # BLD AUTO: 0.26 THOUSAND/ΜL (ref 0–0.61)
EOSINOPHIL NFR BLD AUTO: 4 % (ref 0–6)
ERYTHROCYTE [DISTWIDTH] IN BLOOD BY AUTOMATED COUNT: 13.5 % (ref 11.6–15.1)
EST. AVERAGE GLUCOSE BLD GHB EST-MCNC: 108 MG/DL
FOLATE SERPL-MCNC: 11.8 NG/ML (ref 3.1–17.5)
GFR SERPL CREATININE-BSD FRML MDRD: 83 ML/MIN/1.73SQ M
GGT SERPL-CCNC: 9 U/L (ref 5–85)
GLUCOSE P FAST SERPL-MCNC: 94 MG/DL (ref 65–99)
HBA1C MFR BLD: 5.4 %
HCT VFR BLD AUTO: 45 % (ref 36.5–49.3)
HCV AB SER QL: NORMAL
HGB BLD-MCNC: 14.2 G/DL (ref 12–17)
IMM GRANULOCYTES # BLD AUTO: 0.01 THOUSAND/UL (ref 0–0.2)
IMM GRANULOCYTES NFR BLD AUTO: 0 % (ref 0–2)
LYMPHOCYTES # BLD AUTO: 1.81 THOUSANDS/ΜL (ref 0.6–4.47)
LYMPHOCYTES NFR BLD AUTO: 27 % (ref 14–44)
MCH RBC QN AUTO: 31.1 PG (ref 26.8–34.3)
MCHC RBC AUTO-ENTMCNC: 31.6 G/DL (ref 31.4–37.4)
MCV RBC AUTO: 99 FL (ref 82–98)
MONOCYTES # BLD AUTO: 0.59 THOUSAND/ΜL (ref 0.17–1.22)
MONOCYTES NFR BLD AUTO: 9 % (ref 4–12)
NEUTROPHILS # BLD AUTO: 3.96 THOUSANDS/ΜL (ref 1.85–7.62)
NEUTS SEG NFR BLD AUTO: 59 % (ref 43–75)
NRBC BLD AUTO-RTO: 0 /100 WBCS
PLATELET # BLD AUTO: 181 THOUSANDS/UL (ref 149–390)
PMV BLD AUTO: 10.3 FL (ref 8.9–12.7)
POTASSIUM SERPL-SCNC: 4 MMOL/L (ref 3.5–5.3)
PROT SERPL-MCNC: 7.3 G/DL (ref 6.4–8.2)
RBC # BLD AUTO: 4.57 MILLION/UL (ref 3.88–5.62)
SODIUM SERPL-SCNC: 144 MMOL/L (ref 136–145)
TSH SERPL DL<=0.05 MIU/L-ACNC: 2.36 UIU/ML (ref 0.36–3.74)
VIT B12 SERPL-MCNC: 890 PG/ML (ref 100–900)
WBC # BLD AUTO: 6.66 THOUSAND/UL (ref 4.31–10.16)

## 2020-03-10 PROCEDURE — 86803 HEPATITIS C AB TEST: CPT

## 2020-03-10 PROCEDURE — 86430 RHEUMATOID FACTOR TEST QUAL: CPT

## 2020-03-10 PROCEDURE — 84165 PROTEIN E-PHORESIS SERUM: CPT

## 2020-03-10 PROCEDURE — 82977 ASSAY OF GGT: CPT

## 2020-03-10 PROCEDURE — 36415 COLL VENOUS BLD VENIPUNCTURE: CPT

## 2020-03-10 PROCEDURE — 80053 COMPREHEN METABOLIC PANEL: CPT

## 2020-03-10 PROCEDURE — 86038 ANTINUCLEAR ANTIBODIES: CPT

## 2020-03-10 PROCEDURE — 82746 ASSAY OF FOLIC ACID SERUM: CPT

## 2020-03-10 PROCEDURE — 82607 VITAMIN B-12: CPT

## 2020-03-10 PROCEDURE — 84165 PROTEIN E-PHORESIS SERUM: CPT | Performed by: PATHOLOGY

## 2020-03-10 PROCEDURE — 83036 HEMOGLOBIN GLYCOSYLATED A1C: CPT

## 2020-03-10 PROCEDURE — 85025 COMPLETE CBC W/AUTO DIFF WBC: CPT

## 2020-03-10 PROCEDURE — 84443 ASSAY THYROID STIM HORMONE: CPT

## 2020-03-11 DIAGNOSIS — E87.6 HYPOKALEMIA: ICD-10-CM

## 2020-03-11 LAB
ALBUMIN SERPL ELPH-MCNC: 4 G/DL (ref 3.5–5)
ALBUMIN SERPL ELPH-MCNC: 58 % (ref 52–65)
ALPHA1 GLOB SERPL ELPH-MCNC: 0.3 G/DL (ref 0.1–0.4)
ALPHA1 GLOB SERPL ELPH-MCNC: 4.4 % (ref 2.5–5)
ALPHA2 GLOB SERPL ELPH-MCNC: 0.76 G/DL (ref 0.4–1.2)
ALPHA2 GLOB SERPL ELPH-MCNC: 11 % (ref 7–13)
BETA GLOB ABNORMAL SERPL ELPH-MCNC: 0.39 G/DL (ref 0.4–0.8)
BETA1 GLOB SERPL ELPH-MCNC: 5.7 % (ref 5–13)
BETA2 GLOB SERPL ELPH-MCNC: 7.5 % (ref 2–8)
BETA2+GAMMA GLOB SERPL ELPH-MCNC: 0.52 G/DL (ref 0.2–0.5)
GAMMA GLOB ABNORMAL SERPL ELPH-MCNC: 0.92 G/DL (ref 0.5–1.6)
GAMMA GLOB SERPL ELPH-MCNC: 13.4 % (ref 12–22)
IGG/ALB SER: 1.38 {RATIO} (ref 1.1–1.8)
PROT PATTERN SERPL ELPH-IMP: ABNORMAL
PROT SERPL-MCNC: 6.9 G/DL (ref 6.4–8.2)
RHEUMATOID FACT SER QL LA: NEGATIVE
RYE IGE QN: NEGATIVE

## 2020-03-11 RX ORDER — POTASSIUM CHLORIDE 600 MG/1
TABLET, FILM COATED, EXTENDED RELEASE ORAL
Qty: 90 TABLET | Refills: 0 | Status: SHIPPED | OUTPATIENT
Start: 2020-03-11 | End: 2020-06-15

## 2020-03-16 DIAGNOSIS — G62.9 NEUROPATHY: ICD-10-CM

## 2020-03-16 RX ORDER — GABAPENTIN 100 MG/1
CAPSULE ORAL
Qty: 360 CAPSULE | Refills: 3 | Status: SHIPPED | OUTPATIENT
Start: 2020-03-16 | End: 2021-06-25

## 2020-04-30 ENCOUNTER — TELEPHONE (OUTPATIENT)
Dept: FAMILY MEDICINE CLINIC | Facility: CLINIC | Age: 70
End: 2020-04-30

## 2020-05-10 DIAGNOSIS — E78.5 HYPERLIPIDEMIA, UNSPECIFIED HYPERLIPIDEMIA TYPE: ICD-10-CM

## 2020-05-11 RX ORDER — LISINOPRIL 40 MG/1
TABLET ORAL
Qty: 90 TABLET | Refills: 0 | Status: SHIPPED | OUTPATIENT
Start: 2020-05-11 | End: 2020-08-10

## 2020-05-19 ENCOUNTER — APPOINTMENT (OUTPATIENT)
Dept: LAB | Age: 70
End: 2020-05-19
Payer: MEDICARE

## 2020-05-19 DIAGNOSIS — N40.1 ENLARGED PROSTATE WITH URINARY OBSTRUCTION: ICD-10-CM

## 2020-05-19 DIAGNOSIS — N13.8 ENLARGED PROSTATE WITH URINARY OBSTRUCTION: ICD-10-CM

## 2020-05-19 LAB — PSA SERPL-MCNC: 0.9 NG/ML (ref 0–4)

## 2020-05-19 PROCEDURE — 84153 ASSAY OF PSA TOTAL: CPT

## 2020-05-19 PROCEDURE — 36415 COLL VENOUS BLD VENIPUNCTURE: CPT

## 2020-05-29 ENCOUNTER — TELEMEDICINE (OUTPATIENT)
Dept: UROLOGY | Facility: MEDICAL CENTER | Age: 70
End: 2020-05-29
Payer: MEDICARE

## 2020-05-29 DIAGNOSIS — N13.8 BPH WITH OBSTRUCTION/LOWER URINARY TRACT SYMPTOMS: Primary | ICD-10-CM

## 2020-05-29 DIAGNOSIS — N40.1 BPH WITH OBSTRUCTION/LOWER URINARY TRACT SYMPTOMS: Primary | ICD-10-CM

## 2020-05-29 PROCEDURE — 99442 PR PHYS/QHP TELEPHONE EVALUATION 11-20 MIN: CPT | Performed by: UROLOGY

## 2020-05-29 RX ORDER — FAMOTIDINE 40 MG/1
40 TABLET, FILM COATED ORAL DAILY
COMMUNITY

## 2020-06-02 ENCOUNTER — OFFICE VISIT (OUTPATIENT)
Dept: FAMILY MEDICINE CLINIC | Facility: CLINIC | Age: 70
End: 2020-06-02
Payer: MEDICARE

## 2020-06-02 VITALS
RESPIRATION RATE: 16 BRPM | BODY MASS INDEX: 37.28 KG/M2 | WEIGHT: 260.4 LBS | HEIGHT: 70 IN | OXYGEN SATURATION: 97 % | DIASTOLIC BLOOD PRESSURE: 70 MMHG | HEART RATE: 83 BPM | TEMPERATURE: 97.7 F | SYSTOLIC BLOOD PRESSURE: 120 MMHG

## 2020-06-02 DIAGNOSIS — E66.01 MORBID (SEVERE) OBESITY DUE TO EXCESS CALORIES (HCC): ICD-10-CM

## 2020-06-02 DIAGNOSIS — M48.062 SPINAL STENOSIS OF LUMBAR REGION WITH NEUROGENIC CLAUDICATION: ICD-10-CM

## 2020-06-02 DIAGNOSIS — G62.9 NEUROPATHY: ICD-10-CM

## 2020-06-02 DIAGNOSIS — R26.9 GAIT DISORDER: ICD-10-CM

## 2020-06-02 DIAGNOSIS — M47.12 CERVICAL SPONDYLOSIS WITH MYELOPATHY: ICD-10-CM

## 2020-06-02 DIAGNOSIS — I10 BENIGN ESSENTIAL HYPERTENSION: ICD-10-CM

## 2020-06-02 DIAGNOSIS — Z98.1 S/P CERVICAL SPINAL FUSION: ICD-10-CM

## 2020-06-02 DIAGNOSIS — E78.00 PURE HYPERCHOLESTEROLEMIA: Primary | ICD-10-CM

## 2020-06-02 PROCEDURE — 1125F AMNT PAIN NOTED PAIN PRSNT: CPT | Performed by: INTERNAL MEDICINE

## 2020-06-02 PROCEDURE — 1170F FXNL STATUS ASSESSED: CPT | Performed by: INTERNAL MEDICINE

## 2020-06-02 PROCEDURE — 3074F SYST BP LT 130 MM HG: CPT | Performed by: INTERNAL MEDICINE

## 2020-06-02 PROCEDURE — G0439 PPPS, SUBSEQ VISIT: HCPCS | Performed by: INTERNAL MEDICINE

## 2020-06-02 PROCEDURE — 4040F PNEUMOC VAC/ADMIN/RCVD: CPT | Performed by: INTERNAL MEDICINE

## 2020-06-02 PROCEDURE — 3078F DIAST BP <80 MM HG: CPT | Performed by: INTERNAL MEDICINE

## 2020-06-02 PROCEDURE — 1160F RVW MEDS BY RX/DR IN RCRD: CPT | Performed by: INTERNAL MEDICINE

## 2020-06-02 PROCEDURE — 3008F BODY MASS INDEX DOCD: CPT | Performed by: INTERNAL MEDICINE

## 2020-06-02 PROCEDURE — 1036F TOBACCO NON-USER: CPT | Performed by: INTERNAL MEDICINE

## 2020-06-02 RX ORDER — OXYCODONE HYDROCHLORIDE AND ACETAMINOPHEN 5; 325 MG/1; MG/1
1 TABLET ORAL EVERY 8 HOURS PRN
Qty: 30 TABLET | Refills: 0 | Status: SHIPPED | OUTPATIENT
Start: 2020-06-02 | End: 2020-09-03 | Stop reason: SDUPTHER

## 2020-06-14 DIAGNOSIS — E87.6 HYPOKALEMIA: ICD-10-CM

## 2020-06-15 RX ORDER — POTASSIUM CHLORIDE 600 MG/1
TABLET, FILM COATED, EXTENDED RELEASE ORAL
Qty: 90 TABLET | Refills: 0 | Status: SHIPPED | OUTPATIENT
Start: 2020-06-15 | End: 2020-11-09

## 2020-08-09 DIAGNOSIS — E78.5 HYPERLIPIDEMIA, UNSPECIFIED HYPERLIPIDEMIA TYPE: ICD-10-CM

## 2020-08-10 RX ORDER — LISINOPRIL 40 MG/1
TABLET ORAL
Qty: 90 TABLET | Refills: 0 | Status: SHIPPED | OUTPATIENT
Start: 2020-08-10 | End: 2020-11-17

## 2020-08-18 ENCOUNTER — APPOINTMENT (OUTPATIENT)
Dept: LAB | Age: 70
End: 2020-08-18
Payer: MEDICARE

## 2020-08-18 DIAGNOSIS — E78.00 PURE HYPERCHOLESTEROLEMIA: ICD-10-CM

## 2020-08-18 LAB
ALBUMIN SERPL BCP-MCNC: 3.8 G/DL (ref 3.5–5)
ALP SERPL-CCNC: 135 U/L (ref 46–116)
ALT SERPL W P-5'-P-CCNC: 28 U/L (ref 12–78)
ANION GAP SERPL CALCULATED.3IONS-SCNC: 4 MMOL/L (ref 4–13)
AST SERPL W P-5'-P-CCNC: 20 U/L (ref 5–45)
BASOPHILS # BLD AUTO: 0.02 THOUSANDS/ΜL (ref 0–0.1)
BASOPHILS NFR BLD AUTO: 0 % (ref 0–1)
BILIRUB SERPL-MCNC: 0.83 MG/DL (ref 0.2–1)
BUN SERPL-MCNC: 22 MG/DL (ref 5–25)
CALCIUM SERPL-MCNC: 9.1 MG/DL (ref 8.3–10.1)
CHLORIDE SERPL-SCNC: 106 MMOL/L (ref 100–108)
CO2 SERPL-SCNC: 32 MMOL/L (ref 21–32)
CREAT SERPL-MCNC: 0.98 MG/DL (ref 0.6–1.3)
EOSINOPHIL # BLD AUTO: 0.38 THOUSAND/ΜL (ref 0–0.61)
EOSINOPHIL NFR BLD AUTO: 5 % (ref 0–6)
ERYTHROCYTE [DISTWIDTH] IN BLOOD BY AUTOMATED COUNT: 13.6 % (ref 11.6–15.1)
GFR SERPL CREATININE-BSD FRML MDRD: 78 ML/MIN/1.73SQ M
GLUCOSE P FAST SERPL-MCNC: 100 MG/DL (ref 65–99)
HCT VFR BLD AUTO: 46.4 % (ref 36.5–49.3)
HGB BLD-MCNC: 14.6 G/DL (ref 12–17)
IMM GRANULOCYTES # BLD AUTO: 0.02 THOUSAND/UL (ref 0–0.2)
IMM GRANULOCYTES NFR BLD AUTO: 0 % (ref 0–2)
LDLC SERPL DIRECT ASSAY-MCNC: 79 MG/DL (ref 0–100)
LYMPHOCYTES # BLD AUTO: 2.18 THOUSANDS/ΜL (ref 0.6–4.47)
LYMPHOCYTES NFR BLD AUTO: 30 % (ref 14–44)
MCH RBC QN AUTO: 30.9 PG (ref 26.8–34.3)
MCHC RBC AUTO-ENTMCNC: 31.5 G/DL (ref 31.4–37.4)
MCV RBC AUTO: 98 FL (ref 82–98)
MONOCYTES # BLD AUTO: 0.74 THOUSAND/ΜL (ref 0.17–1.22)
MONOCYTES NFR BLD AUTO: 10 % (ref 4–12)
NEUTROPHILS # BLD AUTO: 3.96 THOUSANDS/ΜL (ref 1.85–7.62)
NEUTS SEG NFR BLD AUTO: 55 % (ref 43–75)
NRBC BLD AUTO-RTO: 0 /100 WBCS
PLATELET # BLD AUTO: 180 THOUSANDS/UL (ref 149–390)
PMV BLD AUTO: 10.4 FL (ref 8.9–12.7)
POTASSIUM SERPL-SCNC: 4 MMOL/L (ref 3.5–5.3)
PROT SERPL-MCNC: 7.4 G/DL (ref 6.4–8.2)
RBC # BLD AUTO: 4.72 MILLION/UL (ref 3.88–5.62)
SODIUM SERPL-SCNC: 142 MMOL/L (ref 136–145)
TRIGL SERPL-MCNC: 112 MG/DL
WBC # BLD AUTO: 7.3 THOUSAND/UL (ref 4.31–10.16)

## 2020-08-18 PROCEDURE — 80053 COMPREHEN METABOLIC PANEL: CPT

## 2020-08-18 PROCEDURE — 85025 COMPLETE CBC W/AUTO DIFF WBC: CPT

## 2020-08-18 PROCEDURE — 84478 ASSAY OF TRIGLYCERIDES: CPT

## 2020-08-18 PROCEDURE — 83721 ASSAY OF BLOOD LIPOPROTEIN: CPT

## 2020-08-18 PROCEDURE — 36415 COLL VENOUS BLD VENIPUNCTURE: CPT

## 2020-09-03 ENCOUNTER — OFFICE VISIT (OUTPATIENT)
Dept: FAMILY MEDICINE CLINIC | Facility: CLINIC | Age: 70
End: 2020-09-03
Payer: MEDICARE

## 2020-09-03 ENCOUNTER — TELEPHONE (OUTPATIENT)
Dept: FAMILY MEDICINE CLINIC | Facility: CLINIC | Age: 70
End: 2020-09-03

## 2020-09-03 VITALS
HEART RATE: 74 BPM | WEIGHT: 263.8 LBS | OXYGEN SATURATION: 95 % | DIASTOLIC BLOOD PRESSURE: 70 MMHG | SYSTOLIC BLOOD PRESSURE: 120 MMHG | RESPIRATION RATE: 18 BRPM | HEIGHT: 70 IN | TEMPERATURE: 97.9 F | BODY MASS INDEX: 37.77 KG/M2

## 2020-09-03 DIAGNOSIS — I25.10 CORONARY ARTERY DISEASE INVOLVING NATIVE CORONARY ARTERY OF NATIVE HEART WITHOUT ANGINA PECTORIS: ICD-10-CM

## 2020-09-03 DIAGNOSIS — E78.00 PURE HYPERCHOLESTEROLEMIA: ICD-10-CM

## 2020-09-03 DIAGNOSIS — G62.9 NEUROPATHY: ICD-10-CM

## 2020-09-03 DIAGNOSIS — I10 BENIGN ESSENTIAL HYPERTENSION: ICD-10-CM

## 2020-09-03 DIAGNOSIS — E66.01 MORBID (SEVERE) OBESITY DUE TO EXCESS CALORIES (HCC): ICD-10-CM

## 2020-09-03 DIAGNOSIS — R73.03 PRE-DIABETES: Primary | ICD-10-CM

## 2020-09-03 PROCEDURE — 99214 OFFICE O/P EST MOD 30 MIN: CPT | Performed by: INTERNAL MEDICINE

## 2020-09-03 RX ORDER — OXYCODONE HYDROCHLORIDE AND ACETAMINOPHEN 5; 325 MG/1; MG/1
1 TABLET ORAL EVERY 8 HOURS PRN
Qty: 30 TABLET | Refills: 0 | Status: SHIPPED | OUTPATIENT
Start: 2020-09-03 | End: 2020-11-10 | Stop reason: SDUPTHER

## 2020-09-03 NOTE — TELEPHONE ENCOUNTER
[facility] called and needs verification on his medication oxyCODONE-acetaminophen (Percocet) 5-325 mg per tablet    Please call them at [phone]

## 2020-09-03 NOTE — PROGRESS NOTES
Assessment/Plan:   hyperlipidemia is control with direct LDL is 79 normal triglyceride on Lipitor 40 mg  The patient will maintain this dose as it would be the goal to tell if he developed muscular side effects and would have minimal benefit from increasing further  Hypertension is well controlled    Fasting blood sugars 100  Hemoglobin A1c will be checked next visit follow his pre diabetes  Patient is gained approximately 13 lb in the past 8 months and we discussed various strategies for him to control his weight  Diet reviewed  Lifestyle modifications reviewed  Medications reviewed and ordered  Laboratory tests and studies reviewed and ordered  All patient's questions answered to patient satisfaction  Chief Complaint   Patient presents with    Hyperglycemia    Hyperlipidemia         Diagnoses and all orders for this visit:    Pre-diabetes  -     CBC and differential; Future  -     Comprehensive metabolic panel; Future  -     Hemoglobin A1C; Future  -     LDL cholesterol, direct; Future  -     Triglycerides; Future    Pure hypercholesterolemia  -     CBC and differential; Future  -     Comprehensive metabolic panel; Future  -     Hemoglobin A1C; Future  -     LDL cholesterol, direct; Future  -     Triglycerides; Future    Morbid (severe) obesity due to excess calories (HCC)    Benign essential hypertension  -     CBC and differential; Future  -     Comprehensive metabolic panel; Future  -     Hemoglobin A1C; Future  -     LDL cholesterol, direct; Future  -     Triglycerides; Future    Coronary artery disease involving native coronary artery of native heart without angina pectoris    Neuropathy  -     oxyCODONE-acetaminophen (Percocet) 5-325 mg per tablet; Take 1 tablet by mouth every 8 (eight) hours as needed for moderate pain 1/d prn painMax Daily Amount: 3 tablets        Subjective:        This 77-year-old male patient with hyperlipidemia is control with direct LDL is 79 normal triglyceride on Lipitor 40 mg  The patient will maintain this dose as it would be the goal to tell if he developed muscular side effects and would have minimal benefit from increasing further  Hypertension is well controlled    Fasting blood sugars 100  Hemoglobin A1c will be checked next visit follow his pre diabetes  Patient is gained approximately 13 lb in the past 8 months and we discussed various strategies for him to control his weight  Patient ID: Lynette Newman is a 79 y o  male          Current Outpatient Medications:     Acetaminophen (TYLENOL ARTHRITIS PAIN PO), Take by mouth daily, Disp: , Rfl:     amoxicillin (AMOXIL) 500 mg capsule, Only dental work, Disp: , Rfl:     aspirin (ECOTRIN LOW STRENGTH) 81 mg EC tablet, Take 81 mg by mouth daily, Disp: , Rfl:     atorvastatin (LIPITOR) 40 mg tablet, Take 1 tablet (40 mg total) by mouth daily, Disp: 90 tablet, Rfl: 1    Cholecalciferol (VITAMIN D-3) 5000 units TABS, take 1 tablet daily, Disp: , Rfl:     famotidine (PEPCID) 40 MG tablet, Take 40 mg by mouth daily, Disp: , Rfl:     gabapentin (NEURONTIN) 100 mg capsule, Take 4 tabs daily, Disp: 360 capsule, Rfl: 3    lisinopril (ZESTRIL) 40 mg tablet, TAKE ONE TABLET BY MOUTH ONCE DAILY , Disp: 90 tablet, Rfl: 0    oxyCODONE-acetaminophen (Percocet) 5-325 mg per tablet, Take 1 tablet by mouth every 8 (eight) hours as needed for moderate pain 1/d prn painMax Daily Amount: 3 tablets, Disp: 30 tablet, Rfl: 0    pantoprazole (PROTONIX) 40 mg tablet, Take 1 tablet (40 mg total) by mouth daily before breakfast, Disp: 90 tablet, Rfl: 3    potassium chloride (KLOR-CON) 8 MEQ tablet, TAKE ONE TABLET BY MOUTH ONCE DAILY , Disp: 90 tablet, Rfl: 0    The following portions of the patient's history were reviewed and updated as appropriate: allergies, current medications, past family history, past medical history, past social history, past surgical history and problem list     Review of Systems   Constitutional: Negative for appetite change, fatigue, fever and unexpected weight change  HENT: Negative for rhinorrhea, sinus pressure, sinus pain, sneezing and sore throat  Eyes: Negative for visual disturbance  Respiratory: Negative for cough, chest tightness, shortness of breath and wheezing  Cardiovascular: Negative for chest pain, palpitations and leg swelling  Gastrointestinal: Negative for abdominal distention, abdominal pain, blood in stool, constipation, diarrhea, nausea and vomiting  Endocrine: Negative for polydipsia and polyuria  Genitourinary: Negative for decreased urine volume, difficulty urinating, dysuria, hematuria and urgency  Musculoskeletal: Negative for arthralgias, back pain, joint swelling and neck pain  Skin: Negative for rash  Allergic/Immunologic: Negative for environmental allergies  Neurological: Negative for tremors, weakness, light-headedness, numbness and headaches  Hematological: Does not bruise/bleed easily  Psychiatric/Behavioral: Negative for agitation, behavioral problems, confusion and dysphoric mood  The patient is not nervous/anxious            Family History   Problem Relation Age of Onset   [facility] Mother     Dementia Mother    [facility] Father        Past Medical History:   Diagnosis Date    Arthritis     Coronary artery disease     Hypertension     Obesity        Past Surgical History:   Procedure Laterality Date    CERVICAL DISCECTOMY      KNEE SURGERY      SPINE SURGERY         Social History     Socioeconomic History    Marital status:      Spouse name: None    Number of children: None    Years of education: None    Highest education level: None   Occupational History    Occupation: retired   Social Needs    Financial resource strain: None    Food insecurity     Worry: None     Inability: None    Transportation needs     Medical: None     Non-medical: None   Tobacco Use    Smoking status: Former Smoker    Smokeless tobacco: Former User    Tobacco comment: quit 10 years ago pipe, NEVER A SMOKER AS PER [employer]   Substance and Sexual Activity    Alcohol use: No    Drug use: No    Sexual activity: Never     Partners: Female     Birth control/protection: Abstinence   Lifestyle    Physical activity     Days per week: None     Minutes per session: None    Stress: None   Relationships    Social connections     Talks on phone: None     Gets together: None     Attends Yarsani service: None     Active member of club or organization: None     Attends meetings of clubs or organizations: None     Relationship status: None    Intimate partner violence     Fear of current or ex partner: None     Emotionally abused: None     Physically abused: None     Forced sexual activity: None   Other Topics Concern    None   Social History Narrative    Pt states he has about 2-3 cups of cold Ice Tea daily       Allergies   Allergen Reactions    Ciprofloxacin GI Intolerance    Cephalexin     Oxybutynin GI Intolerance    Solifenacin Dizziness    Tiotropium Bromide Monohydrate GI Intolerance            Objective:    /70 (BP Location: Left arm, Patient Position: Sitting, Cuff Size: Adult)   Pulse 74   Temp 97 9 °F (36 6 °C)   Resp 18   Ht 5' 10" (1 778 m)   Wt 120 kg (263 lb 12 8 oz)   SpO2 95%   BMI 37 85 kg/m²        Physical Exam  Constitutional:       General: He is not in acute distress  Appearance: He is well-developed  HENT:      Head: Normocephalic and atraumatic  Nose: Nose normal       Mouth/Throat:      Pharynx: No oropharyngeal exudate  Eyes:      General: No scleral icterus  Conjunctiva/sclera: Conjunctivae normal       Pupils: Pupils are equal, round, and reactive to light  Neck:      Musculoskeletal: Normal range of motion and neck supple  Thyroid: No thyromegaly  Vascular: No JVD  Trachea: No tracheal deviation  Cardiovascular:      Rate and Rhythm: Normal rate and regular rhythm        Heart sounds: Normal heart sounds  No murmur  No friction rub  No gallop  Pulmonary:      Effort: Pulmonary effort is normal  No respiratory distress  Breath sounds: No wheezing or rales  Chest:      Chest wall: No tenderness  Abdominal:      General: Bowel sounds are normal  There is no distension  Palpations: Abdomen is soft  There is no mass  Tenderness: There is no abdominal tenderness  There is no guarding or rebound  Musculoskeletal: Normal range of motion  General: No deformity  Lymphadenopathy:      Cervical: No cervical adenopathy  Skin:     General: Skin is warm and dry  Findings: No rash  Neurological:      Mental Status: He is alert and oriented to person, place, and time  Cranial Nerves: No cranial nerve deficit  Coordination: Coordination normal    Psychiatric:         Behavior: Behavior normal          Thought Content:  Thought content normal          Judgment: Judgment normal

## 2020-10-27 ENCOUNTER — LAB (OUTPATIENT)
Dept: LAB | Age: 70
End: 2020-10-27
Payer: MEDICARE

## 2020-10-27 DIAGNOSIS — E78.00 PURE HYPERCHOLESTEROLEMIA: ICD-10-CM

## 2020-10-27 DIAGNOSIS — I10 BENIGN ESSENTIAL HYPERTENSION: ICD-10-CM

## 2020-10-27 DIAGNOSIS — R73.03 PRE-DIABETES: ICD-10-CM

## 2020-10-27 LAB
ALBUMIN SERPL BCP-MCNC: 4.1 G/DL (ref 3.5–5)
ALP SERPL-CCNC: 145 U/L (ref 46–116)
ALT SERPL W P-5'-P-CCNC: 35 U/L (ref 12–78)
ANION GAP SERPL CALCULATED.3IONS-SCNC: 2 MMOL/L (ref 4–13)
AST SERPL W P-5'-P-CCNC: 18 U/L (ref 5–45)
BASOPHILS # BLD AUTO: 0.04 THOUSANDS/ΜL (ref 0–0.1)
BASOPHILS NFR BLD AUTO: 1 % (ref 0–1)
BILIRUB SERPL-MCNC: 1.16 MG/DL (ref 0.2–1)
BUN SERPL-MCNC: 24 MG/DL (ref 5–25)
CALCIUM SERPL-MCNC: 9 MG/DL (ref 8.3–10.1)
CHLORIDE SERPL-SCNC: 105 MMOL/L (ref 100–108)
CO2 SERPL-SCNC: 34 MMOL/L (ref 21–32)
CREAT SERPL-MCNC: 1.02 MG/DL (ref 0.6–1.3)
EOSINOPHIL # BLD AUTO: 0.35 THOUSAND/ΜL (ref 0–0.61)
EOSINOPHIL NFR BLD AUTO: 5 % (ref 0–6)
ERYTHROCYTE [DISTWIDTH] IN BLOOD BY AUTOMATED COUNT: 13.8 % (ref 11.6–15.1)
EST. AVERAGE GLUCOSE BLD GHB EST-MCNC: 108 MG/DL
GFR SERPL CREATININE-BSD FRML MDRD: 74 ML/MIN/1.73SQ M
GLUCOSE P FAST SERPL-MCNC: 97 MG/DL (ref 65–99)
HBA1C MFR BLD: 5.4 %
HCT VFR BLD AUTO: 48.8 % (ref 36.5–49.3)
HGB BLD-MCNC: 15.5 G/DL (ref 12–17)
IMM GRANULOCYTES # BLD AUTO: 0.02 THOUSAND/UL (ref 0–0.2)
IMM GRANULOCYTES NFR BLD AUTO: 0 % (ref 0–2)
LDLC SERPL DIRECT ASSAY-MCNC: 83 MG/DL (ref 0–100)
LYMPHOCYTES # BLD AUTO: 2.2 THOUSANDS/ΜL (ref 0.6–4.47)
LYMPHOCYTES NFR BLD AUTO: 30 % (ref 14–44)
MCH RBC QN AUTO: 31.2 PG (ref 26.8–34.3)
MCHC RBC AUTO-ENTMCNC: 31.8 G/DL (ref 31.4–37.4)
MCV RBC AUTO: 98 FL (ref 82–98)
MONOCYTES # BLD AUTO: 0.62 THOUSAND/ΜL (ref 0.17–1.22)
MONOCYTES NFR BLD AUTO: 9 % (ref 4–12)
NEUTROPHILS # BLD AUTO: 4.01 THOUSANDS/ΜL (ref 1.85–7.62)
NEUTS SEG NFR BLD AUTO: 55 % (ref 43–75)
NRBC BLD AUTO-RTO: 0 /100 WBCS
PLATELET # BLD AUTO: 184 THOUSANDS/UL (ref 149–390)
PMV BLD AUTO: 10.7 FL (ref 8.9–12.7)
POTASSIUM SERPL-SCNC: 4.2 MMOL/L (ref 3.5–5.3)
PROT SERPL-MCNC: 7.4 G/DL (ref 6.4–8.2)
RBC # BLD AUTO: 4.97 MILLION/UL (ref 3.88–5.62)
SODIUM SERPL-SCNC: 141 MMOL/L (ref 136–145)
TRIGL SERPL-MCNC: 127 MG/DL
WBC # BLD AUTO: 7.24 THOUSAND/UL (ref 4.31–10.16)

## 2020-10-27 PROCEDURE — 83036 HEMOGLOBIN GLYCOSYLATED A1C: CPT

## 2020-10-27 PROCEDURE — 83721 ASSAY OF BLOOD LIPOPROTEIN: CPT

## 2020-10-27 PROCEDURE — 85025 COMPLETE CBC W/AUTO DIFF WBC: CPT

## 2020-10-27 PROCEDURE — 84478 ASSAY OF TRIGLYCERIDES: CPT

## 2020-10-27 PROCEDURE — 36415 COLL VENOUS BLD VENIPUNCTURE: CPT

## 2020-10-27 PROCEDURE — 80053 COMPREHEN METABOLIC PANEL: CPT

## 2020-11-08 DIAGNOSIS — E87.6 HYPOKALEMIA: ICD-10-CM

## 2020-11-09 RX ORDER — POTASSIUM CHLORIDE 600 MG/1
TABLET, FILM COATED, EXTENDED RELEASE ORAL
Qty: 90 TABLET | Refills: 0 | Status: SHIPPED | OUTPATIENT
Start: 2020-11-09 | End: 2021-03-29

## 2020-11-10 ENCOUNTER — TELEMEDICINE (OUTPATIENT)
Dept: FAMILY MEDICINE CLINIC | Facility: CLINIC | Age: 70
End: 2020-11-10
Payer: MEDICARE

## 2020-11-10 DIAGNOSIS — M48.062 SPINAL STENOSIS OF LUMBAR REGION WITH NEUROGENIC CLAUDICATION: ICD-10-CM

## 2020-11-10 DIAGNOSIS — M47.12 CERVICAL SPONDYLOSIS WITH MYELOPATHY: ICD-10-CM

## 2020-11-10 DIAGNOSIS — G62.9 NEUROPATHY: ICD-10-CM

## 2020-11-10 DIAGNOSIS — G60.9 IDIOPATHIC PERIPHERAL NEUROPATHY: ICD-10-CM

## 2020-11-10 DIAGNOSIS — I10 BENIGN ESSENTIAL HYPERTENSION: ICD-10-CM

## 2020-11-10 DIAGNOSIS — I25.10 CORONARY ARTERY DISEASE INVOLVING NATIVE CORONARY ARTERY OF NATIVE HEART WITHOUT ANGINA PECTORIS: ICD-10-CM

## 2020-11-10 DIAGNOSIS — E78.2 MIXED HYPERLIPIDEMIA: Primary | ICD-10-CM

## 2020-11-10 DIAGNOSIS — E66.01 MORBID (SEVERE) OBESITY DUE TO EXCESS CALORIES (HCC): ICD-10-CM

## 2020-11-10 DIAGNOSIS — Z98.1 S/P CERVICAL SPINAL FUSION: ICD-10-CM

## 2020-11-10 PROCEDURE — 99214 OFFICE O/P EST MOD 30 MIN: CPT | Performed by: INTERNAL MEDICINE

## 2020-11-10 RX ORDER — OXYCODONE HYDROCHLORIDE AND ACETAMINOPHEN 5; 325 MG/1; MG/1
1 TABLET ORAL EVERY 8 HOURS PRN
Qty: 30 TABLET | Refills: 0 | Status: SHIPPED | OUTPATIENT
Start: 2020-11-10 | End: 2021-04-01 | Stop reason: SDUPTHER

## 2020-11-15 DIAGNOSIS — E78.5 HYPERLIPIDEMIA, UNSPECIFIED HYPERLIPIDEMIA TYPE: ICD-10-CM

## 2020-11-17 RX ORDER — LISINOPRIL 40 MG/1
TABLET ORAL
Qty: 90 TABLET | Refills: 1 | Status: SHIPPED | OUTPATIENT
Start: 2020-11-17 | End: 2021-06-07

## 2020-11-17 RX ORDER — ATORVASTATIN CALCIUM 40 MG/1
TABLET, FILM COATED ORAL
Qty: 90 TABLET | Refills: 0 | Status: SHIPPED | OUTPATIENT
Start: 2020-11-17 | End: 2021-02-22

## 2021-02-07 DIAGNOSIS — K21.9 GASTROESOPHAGEAL REFLUX DISEASE WITHOUT ESOPHAGITIS: ICD-10-CM

## 2021-02-08 RX ORDER — PANTOPRAZOLE SODIUM 40 MG/1
TABLET, DELAYED RELEASE ORAL
Qty: 90 TABLET | Refills: 0 | Status: SHIPPED | OUTPATIENT
Start: 2021-02-08 | End: 2021-05-17

## 2021-02-21 DIAGNOSIS — E78.5 HYPERLIPIDEMIA, UNSPECIFIED HYPERLIPIDEMIA TYPE: ICD-10-CM

## 2021-02-22 RX ORDER — ATORVASTATIN CALCIUM 40 MG/1
TABLET, FILM COATED ORAL
Qty: 90 TABLET | Refills: 0 | Status: SHIPPED | OUTPATIENT
Start: 2021-02-22 | End: 2021-06-01

## 2021-03-09 ENCOUNTER — LAB (OUTPATIENT)
Dept: LAB | Age: 71
End: 2021-03-09
Payer: MEDICARE

## 2021-03-09 DIAGNOSIS — E78.2 MIXED HYPERLIPIDEMIA: ICD-10-CM

## 2021-03-09 DIAGNOSIS — G62.9 NEUROPATHY: ICD-10-CM

## 2021-03-09 DIAGNOSIS — R74.8 ELEVATED ALKALINE PHOSPHATASE LEVEL: Primary | ICD-10-CM

## 2021-03-09 PROBLEM — G60.9 HEREDITARY AND IDIOPATHIC PERIPHERAL NEUROPATHY: Status: ACTIVE | Noted: 2020-03-06

## 2021-03-09 LAB
ALBUMIN SERPL BCP-MCNC: 3.8 G/DL (ref 3.5–5)
ALP SERPL-CCNC: 154 U/L (ref 46–116)
ALT SERPL W P-5'-P-CCNC: 30 U/L (ref 12–78)
ANION GAP SERPL CALCULATED.3IONS-SCNC: 3 MMOL/L (ref 4–13)
AST SERPL W P-5'-P-CCNC: 19 U/L (ref 5–45)
BASOPHILS # BLD AUTO: 0.04 THOUSANDS/ΜL (ref 0–0.1)
BASOPHILS NFR BLD AUTO: 1 % (ref 0–1)
BILIRUB SERPL-MCNC: 1 MG/DL (ref 0.2–1)
BUN SERPL-MCNC: 28 MG/DL (ref 5–25)
CALCIUM SERPL-MCNC: 9 MG/DL (ref 8.3–10.1)
CHLORIDE SERPL-SCNC: 107 MMOL/L (ref 100–108)
CO2 SERPL-SCNC: 32 MMOL/L (ref 21–32)
CREAT SERPL-MCNC: 1.02 MG/DL (ref 0.6–1.3)
EOSINOPHIL # BLD AUTO: 0.31 THOUSAND/ΜL (ref 0–0.61)
EOSINOPHIL NFR BLD AUTO: 4 % (ref 0–6)
ERYTHROCYTE [DISTWIDTH] IN BLOOD BY AUTOMATED COUNT: 13.6 % (ref 11.6–15.1)
GFR SERPL CREATININE-BSD FRML MDRD: 74 ML/MIN/1.73SQ M
GLUCOSE P FAST SERPL-MCNC: 97 MG/DL (ref 65–99)
HCT VFR BLD AUTO: 45.6 % (ref 36.5–49.3)
HGB BLD-MCNC: 14.6 G/DL (ref 12–17)
IMM GRANULOCYTES # BLD AUTO: 0.03 THOUSAND/UL (ref 0–0.2)
IMM GRANULOCYTES NFR BLD AUTO: 0 % (ref 0–2)
LDLC SERPL DIRECT ASSAY-MCNC: 91 MG/DL (ref 0–100)
LYMPHOCYTES # BLD AUTO: 2.15 THOUSANDS/ΜL (ref 0.6–4.47)
LYMPHOCYTES NFR BLD AUTO: 28 % (ref 14–44)
MCH RBC QN AUTO: 30.9 PG (ref 26.8–34.3)
MCHC RBC AUTO-ENTMCNC: 32 G/DL (ref 31.4–37.4)
MCV RBC AUTO: 97 FL (ref 82–98)
MONOCYTES # BLD AUTO: 0.64 THOUSAND/ΜL (ref 0.17–1.22)
MONOCYTES NFR BLD AUTO: 8 % (ref 4–12)
NEUTROPHILS # BLD AUTO: 4.42 THOUSANDS/ΜL (ref 1.85–7.62)
NEUTS SEG NFR BLD AUTO: 59 % (ref 43–75)
NRBC BLD AUTO-RTO: 0 /100 WBCS
PLATELET # BLD AUTO: 177 THOUSANDS/UL (ref 149–390)
PMV BLD AUTO: 9.7 FL (ref 8.9–12.7)
POTASSIUM SERPL-SCNC: 4.1 MMOL/L (ref 3.5–5.3)
PROT SERPL-MCNC: 7.2 G/DL (ref 6.4–8.2)
RBC # BLD AUTO: 4.72 MILLION/UL (ref 3.88–5.62)
SODIUM SERPL-SCNC: 142 MMOL/L (ref 136–145)
TRIGL SERPL-MCNC: 130 MG/DL
WBC # BLD AUTO: 7.59 THOUSAND/UL (ref 4.31–10.16)

## 2021-03-09 PROCEDURE — 84478 ASSAY OF TRIGLYCERIDES: CPT

## 2021-03-09 PROCEDURE — 80053 COMPREHEN METABOLIC PANEL: CPT

## 2021-03-09 PROCEDURE — 85025 COMPLETE CBC W/AUTO DIFF WBC: CPT

## 2021-03-09 PROCEDURE — 83721 ASSAY OF BLOOD LIPOPROTEIN: CPT

## 2021-03-09 PROCEDURE — 36415 COLL VENOUS BLD VENIPUNCTURE: CPT

## 2021-03-10 DIAGNOSIS — Z23 ENCOUNTER FOR IMMUNIZATION: ICD-10-CM

## 2021-03-28 DIAGNOSIS — E87.6 HYPOKALEMIA: ICD-10-CM

## 2021-03-29 RX ORDER — POTASSIUM CHLORIDE 600 MG/1
TABLET, FILM COATED, EXTENDED RELEASE ORAL
Qty: 90 TABLET | Refills: 3 | Status: SHIPPED | OUTPATIENT
Start: 2021-03-29 | End: 2022-05-09

## 2021-04-01 ENCOUNTER — TELEMEDICINE (OUTPATIENT)
Dept: FAMILY MEDICINE CLINIC | Facility: CLINIC | Age: 71
End: 2021-04-01
Payer: MEDICARE

## 2021-04-01 DIAGNOSIS — G62.9 NEUROPATHY: ICD-10-CM

## 2021-04-01 DIAGNOSIS — E66.01 MORBID (SEVERE) OBESITY DUE TO EXCESS CALORIES (HCC): ICD-10-CM

## 2021-04-01 DIAGNOSIS — M54.17 LUMBOSACRAL RADICULITIS: ICD-10-CM

## 2021-04-01 DIAGNOSIS — R73.03 PRE-DIABETES: ICD-10-CM

## 2021-04-01 DIAGNOSIS — I10 BENIGN ESSENTIAL HYPERTENSION: Primary | ICD-10-CM

## 2021-04-01 DIAGNOSIS — G60.9 IDIOPATHIC PERIPHERAL NEUROPATHY: ICD-10-CM

## 2021-04-01 DIAGNOSIS — E78.2 MIXED HYPERLIPIDEMIA: ICD-10-CM

## 2021-04-01 DIAGNOSIS — R73.9 HYPERGLYCEMIA: ICD-10-CM

## 2021-04-01 PROCEDURE — 99214 OFFICE O/P EST MOD 30 MIN: CPT | Performed by: INTERNAL MEDICINE

## 2021-04-01 RX ORDER — OXYCODONE HYDROCHLORIDE AND ACETAMINOPHEN 5; 325 MG/1; MG/1
1 TABLET ORAL EVERY 8 HOURS PRN
Qty: 30 TABLET | Refills: 0 | Status: SHIPPED | OUTPATIENT
Start: 2021-04-01 | End: 2021-08-05 | Stop reason: CLARIF

## 2021-04-01 NOTE — PROGRESS NOTES
Virtual Brief Visit  It was my intent to perform this visit via video technology but the patient was not able to do a video connection so the visit was completed via audio telephone only  Assessment/Plan: This 68-year-old male is finally getting to go to physical therapy for strengthening and balance due to his peripheral neuropathy past cervical spinal cord compression  Apparently is MRIs of his brain showed no evidence of strokes or other pathology last spring  Patient's laboratory studies were reviewed his potassium is normal and on his 8 mEq of potassium chloride daily which will be renewed  Direct LDL is 90 on atorvastatin and this will be continued  The dose cannot be increased due to the patient's current situation with muscular aching in arthritis and fear of increasing muscle side effects  The patient continues on his Percocet 5/325 which he takes and small dosage limited amounts  The patient's alkaline phosphatase is 150 which is slightly higher than it was before and this will continue to be monitored  His GGT will also be checked    Problem List Items Addressed This Visit        Cardiovascular and Mediastinum    Benign essential hypertension - Primary    Relevant Orders    CBC and differential    Comprehensive metabolic panel    Hemoglobin A1C    Gamma GT       Nervous and Auditory    Lumbosacral radiculitis    Peripheral neuropathy       Other    Hyperlipidemia    Relevant Orders    CBC and differential    Comprehensive metabolic panel    Hemoglobin A1C    Gamma GT    Morbid (severe) obesity due to excess calories (HCC)    Pre-diabetes    Relevant Orders    CBC and differential    Comprehensive metabolic panel    Hemoglobin A1C    Gamma GT    Hyperglycemia    Relevant Orders    CBC and differential    Comprehensive metabolic panel    Hemoglobin A1C    Gamma GT      Other Visit Diagnoses     Neuropathy        Relevant Medications    oxyCODONE-acetaminophen (Percocet) 5-325 mg per tablet          BMI Counseling: There is no height or weight on file to calculate BMI  The BMI is above normal  Nutrition recommendations include decreasing portion sizes, encouraging healthy choices of fruits and vegetables, consuming healthier snacks, moderation in carbohydrate intake and increasing intake of lean protein  Exercise recommendations include obtaining a gym membership  No pharmacotherapy was ordered  Patient referred to PCP due to patient being overweight  Reason for visit is   Chief Complaint   Patient presents with    Virtual Brief Visit        Encounter provider Christ Mccracken MD    Provider located at [facility]  [address], [facility] [address] [facility] , [address]  [facility] [phone]    Recent Visits  No visits were found meeting these conditions  Showing recent visits within past 7 days and meeting all other requirements     Today's Visits  Date Type Provider Dept   04/01/21 Telemedicine Christ Mccracken MD [address] [facility] today's visits and meeting all other requirements     Future Appointments  No visits were found meeting these conditions  Showing future appointments within next 150 days and meeting all other requirements        After connecting through telephone, the patient was identified by name and date of birth  Oskarpeter Gómez was informed that this is a telemedicine visit and that the visit is being conducted through telephone  My office door was closed  No one else was in the room  He acknowledged consent and understanding of privacy and security of the platform  The patient has agreed to participate and understands he can discontinue the visit at any time  Patient is aware this is a billable service  Berta Benitez is a 70 y o  male     This 40-year-old male is finally getting to go to physical therapy for strengthening and balance due to his peripheral neuropathy past cervical spinal cord compression  Apparently is MRIs of his brain showed no evidence of strokes or other pathology last spring  Patient's laboratory studies were reviewed his potassium is normal and on his 8 mEq of potassium chloride daily which will be renewed  Direct LDL is 90 on atorvastatin and this will be continued  The dose cannot be increased due to the patient's current situation with muscular aching in arthritis and fear of increasing muscle side effects  The patient continues on his Percocet 5/325 which he takes and small dosage limited amounts  The patient's alkaline phosphatase is 150 which is slightly higher than it was before and this will continue to be monitored  His GGT will also be checked           Past Medical History:   Diagnosis Date    Arthritis     Coronary artery disease     Hypertension     Obesity        Past Surgical History:   Procedure Laterality Date    CERVICAL DISCECTOMY      KNEE SURGERY      SPINE SURGERY         Current Outpatient Medications   Medication Sig Dispense Refill    Acetaminophen (TYLENOL ARTHRITIS PAIN PO) Take by mouth daily      amoxicillin (AMOXIL) 500 mg capsule Only dental work      aspirin (ECOTRIN LOW STRENGTH) 81 mg EC tablet Take 81 mg by mouth daily      atorvastatin (LIPITOR) 40 mg tablet TAKE ONE TABLET BY MOUTH ONCE DAILY  90 tablet 0    Cholecalciferol (VITAMIN D-3) 5000 units TABS take 1 tablet daily      famotidine (PEPCID) 40 MG tablet Take 40 mg by mouth daily      gabapentin (NEURONTIN) 100 mg capsule Take 4 tabs daily 360 capsule 3    lisinopril (ZESTRIL) 40 mg tablet TAKE ONE TABLET BY MOUTH ONCE DAILY  90 tablet 1    oxyCODONE-acetaminophen (Percocet) 5-325 mg per tablet Take 1 tablet by mouth every 8 (eight) hours as needed for moderate pain 1/d prn painMax Daily Amount: 3 tablets 30 tablet 0    pantoprazole (PROTONIX) 40 mg tablet TAKE ONE TABLET BY MOUTH ONCE DAILY before breakfast 90 tablet 0    potassium chloride (KLOR-CON) 8 MEQ tablet TAKE ONE TABLET BY MOUTH ONCE DAILY  90 tablet 3     No current facility-administered medications for this visit  Allergies   Allergen Reactions    Ciprofloxacin GI Intolerance    Cephalexin     Oxybutynin GI Intolerance    Solifenacin Dizziness    Tiotropium Bromide Monohydrate GI Intolerance       Review of Systems   Constitutional: Negative for appetite change, fatigue, fever and unexpected weight change  HENT: Negative for rhinorrhea, sinus pressure, sinus pain, sneezing and sore throat  Eyes: Negative for visual disturbance  Respiratory: Negative for cough, chest tightness, shortness of breath and wheezing  Cardiovascular: Negative for chest pain, palpitations and leg swelling  Gastrointestinal: Negative for abdominal distention, abdominal pain, blood in stool, constipation, diarrhea, nausea and vomiting  Endocrine: Negative for polydipsia and polyuria  Genitourinary: Negative for decreased urine volume, difficulty urinating, dysuria, hematuria and urgency  Musculoskeletal: Positive for arthralgias, back pain, joint swelling, myalgias, neck pain and neck stiffness  Skin: Negative for rash  Allergic/Immunologic: Negative for environmental allergies  Neurological: Negative for tremors, weakness, light-headedness, numbness and headaches  Hematological: Does not bruise/bleed easily  Psychiatric/Behavioral: Negative for agitation, behavioral problems, confusion and dysphoric mood  The patient is not nervous/anxious  There were no vitals filed for this visit  I spent 20 minutes directly with the patient during this visit    VIRTUAL VISIT [address] [place] [address] acknowledges that he has consented to an online visit or consultation   He understands that the online visit is based solely on information provided by him, and that, in the absence of a face-to-face physical evaluation by the physician, the diagnosis he receives is both limited and provisional in terms of accuracy and completeness  This is not intended to replace a full medical face-to-face evaluation by the physician  Shaquille Virgen understands and accepts these terms

## 2021-05-16 DIAGNOSIS — K21.9 GASTROESOPHAGEAL REFLUX DISEASE WITHOUT ESOPHAGITIS: ICD-10-CM

## 2021-05-17 RX ORDER — PANTOPRAZOLE SODIUM 40 MG/1
TABLET, DELAYED RELEASE ORAL
Qty: 90 TABLET | Refills: 0 | Status: SHIPPED | OUTPATIENT
Start: 2021-05-17 | End: 2021-08-23

## 2021-05-25 ENCOUNTER — APPOINTMENT (OUTPATIENT)
Dept: LAB | Age: 71
End: 2021-05-25
Payer: MEDICARE

## 2021-05-25 DIAGNOSIS — N13.8 BPH WITH OBSTRUCTION/LOWER URINARY TRACT SYMPTOMS: ICD-10-CM

## 2021-05-25 DIAGNOSIS — N40.1 BPH WITH OBSTRUCTION/LOWER URINARY TRACT SYMPTOMS: ICD-10-CM

## 2021-05-25 LAB — PSA SERPL-MCNC: 0.8 NG/ML (ref 0–4)

## 2021-05-25 PROCEDURE — G0103 PSA SCREENING: HCPCS

## 2021-05-25 PROCEDURE — 36415 COLL VENOUS BLD VENIPUNCTURE: CPT

## 2021-05-30 DIAGNOSIS — E78.5 HYPERLIPIDEMIA, UNSPECIFIED HYPERLIPIDEMIA TYPE: ICD-10-CM

## 2021-06-01 RX ORDER — ATORVASTATIN CALCIUM 40 MG/1
TABLET, FILM COATED ORAL
Qty: 90 TABLET | Refills: 3 | Status: SHIPPED | OUTPATIENT
Start: 2021-06-01 | End: 2022-06-27

## 2021-06-06 DIAGNOSIS — E78.5 HYPERLIPIDEMIA, UNSPECIFIED HYPERLIPIDEMIA TYPE: ICD-10-CM

## 2021-06-07 ENCOUNTER — OFFICE VISIT (OUTPATIENT)
Dept: UROLOGY | Facility: MEDICAL CENTER | Age: 71
End: 2021-06-07
Payer: MEDICARE

## 2021-06-07 VITALS
SYSTOLIC BLOOD PRESSURE: 132 MMHG | DIASTOLIC BLOOD PRESSURE: 72 MMHG | BODY MASS INDEX: 36.65 KG/M2 | WEIGHT: 256 LBS | HEIGHT: 70 IN

## 2021-06-07 DIAGNOSIS — Z12.5 SPECIAL SCREENING FOR MALIGNANT NEOPLASM OF PROSTATE: Primary | ICD-10-CM

## 2021-06-07 DIAGNOSIS — N13.8 BPH WITH OBSTRUCTION/LOWER URINARY TRACT SYMPTOMS: ICD-10-CM

## 2021-06-07 DIAGNOSIS — N40.1 BPH WITH OBSTRUCTION/LOWER URINARY TRACT SYMPTOMS: ICD-10-CM

## 2021-06-07 PROCEDURE — 99214 OFFICE O/P EST MOD 30 MIN: CPT | Performed by: UROLOGY

## 2021-06-07 RX ORDER — LISINOPRIL 40 MG/1
TABLET ORAL
Qty: 90 TABLET | Refills: 0 | Status: SHIPPED | OUTPATIENT
Start: 2021-06-07 | End: 2021-09-13

## 2021-06-07 NOTE — PROGRESS NOTES
Assessment/Plan:    No problem-specific Assessment & Plan notes found for this encounter  There are no diagnoses linked to this encounter  Subjective:      Patient ID: Caprice Davis is a 70 y o  male  HPI  BPH:  He notes urinary frequency, weak stream and nocturia x 1  He denies other significant urinary symptoms  He denies gross hematuria, urinary tract infections or incontinence  He is taking neither medications nor supplements for his symptoms  PSA:  [  0   Lab Value Date/Time    PSA 0 8 05/25/2021 0821    PSA 0 9 05/19/2020 0830    PSA 0 9 01/14/2019 1056   ]    AUA SYMPTOM SCORE      Most Recent Value   AUA SYMPTOM SCORE   How often have you had a sensation of not emptying your bladder completely after you finished urinating? 2   How often have you had to urinate again less than two hours after you finished urinating? 2   How often have you found you stopped and started again several times when you urinate?  0   How often have you found it difficult to postpone urination? 1   How often have you had a weak urinary stream?  2   How often have you had to push or strain to begin urination? 0   How many times did you most typically get up to urinate from the time you went to bed at night until the time you got up in the morning? 1   Quality of Life: If you were to spend the rest of your life with your urinary condition just the way it is now, how would you feel about that?  2   AUA SYMPTOM SCORE  8        The patient is unable to provide a urine sample  The following portions of the patient's history were reviewed and updated as appropriate: allergies, current medications, past family history, past medical history, past social history, past surgical history and problem list     Review of Systems   Constitutional: Negative for activity change and fatigue  Respiratory: Negative for shortness of breath and wheezing  Cardiovascular: Negative for chest pain     Gastrointestinal: Negative for abdominal pain  Genitourinary: Negative for difficulty urinating, dysuria, frequency, hematuria and urgency  Musculoskeletal: Negative for back pain and gait problem  Skin: Negative  Allergic/Immunologic: Negative  Neurological: Negative  Peripheral neuropathy  Taking gabapentin which does not seem to help but he can sleep better if he takes it and gets up to void much less often  Psychiatric/Behavioral: Negative  Objective:      /72   Ht 5' 10" (1 778 m)   Wt 116 kg (256 lb)   BMI 36 73 kg/m²          Physical Exam  Constitutional:       Appearance: He is well-developed  HENT:      Head: Normocephalic and atraumatic  Neck:      Musculoskeletal: Normal range of motion and neck supple  Pulmonary:      Effort: Pulmonary effort is normal    Genitourinary:     Rectum: Normal       Comments: The prostate is 50 g, smooth, non-tender  Very broad and flat  Musculoskeletal: Normal range of motion  Skin:     General: Skin is warm and dry  Neurological:      Mental Status: He is alert and oriented to person, place, and time  Psychiatric:         Behavior: Behavior normal          Thought Content:  Thought content normal          Judgment: Judgment normal

## 2021-07-13 ENCOUNTER — APPOINTMENT (OUTPATIENT)
Dept: LAB | Age: 71
End: 2021-07-13
Payer: MEDICARE

## 2021-07-13 DIAGNOSIS — I10 BENIGN ESSENTIAL HYPERTENSION: ICD-10-CM

## 2021-07-13 DIAGNOSIS — R73.03 PRE-DIABETES: ICD-10-CM

## 2021-07-13 DIAGNOSIS — E78.2 MIXED HYPERLIPIDEMIA: ICD-10-CM

## 2021-07-13 DIAGNOSIS — R73.9 HYPERGLYCEMIA: ICD-10-CM

## 2021-07-13 DIAGNOSIS — R74.8 ELEVATED ALKALINE PHOSPHATASE LEVEL: ICD-10-CM

## 2021-07-13 LAB
ALBUMIN SERPL BCP-MCNC: 3.4 G/DL (ref 3.5–5)
ALP SERPL-CCNC: 143 U/L (ref 46–116)
ALT SERPL W P-5'-P-CCNC: 21 U/L (ref 12–78)
ANION GAP SERPL CALCULATED.3IONS-SCNC: 6 MMOL/L (ref 4–13)
AST SERPL W P-5'-P-CCNC: 12 U/L (ref 5–45)
BASOPHILS # BLD AUTO: 0.03 THOUSANDS/ΜL (ref 0–0.1)
BASOPHILS NFR BLD AUTO: 0 % (ref 0–1)
BILIRUB SERPL-MCNC: 0.81 MG/DL (ref 0.2–1)
BUN SERPL-MCNC: 24 MG/DL (ref 5–25)
CALCIUM ALBUM COR SERPL-MCNC: 9.6 MG/DL (ref 8.3–10.1)
CALCIUM SERPL-MCNC: 9.1 MG/DL (ref 8.3–10.1)
CHLORIDE SERPL-SCNC: 105 MMOL/L (ref 100–108)
CO2 SERPL-SCNC: 30 MMOL/L (ref 21–32)
CREAT SERPL-MCNC: 0.92 MG/DL (ref 0.6–1.3)
EOSINOPHIL # BLD AUTO: 0.27 THOUSAND/ΜL (ref 0–0.61)
EOSINOPHIL NFR BLD AUTO: 4 % (ref 0–6)
ERYTHROCYTE [DISTWIDTH] IN BLOOD BY AUTOMATED COUNT: 13.6 % (ref 11.6–15.1)
EST. AVERAGE GLUCOSE BLD GHB EST-MCNC: 108 MG/DL
GFR SERPL CREATININE-BSD FRML MDRD: 83 ML/MIN/1.73SQ M
GGT SERPL-CCNC: 10 U/L (ref 5–85)
GLUCOSE P FAST SERPL-MCNC: 95 MG/DL (ref 65–99)
HBA1C MFR BLD: 5.4 %
HCT VFR BLD AUTO: 45 % (ref 36.5–49.3)
HGB BLD-MCNC: 14.6 G/DL (ref 12–17)
IMM GRANULOCYTES # BLD AUTO: 0.03 THOUSAND/UL (ref 0–0.2)
IMM GRANULOCYTES NFR BLD AUTO: 0 % (ref 0–2)
LYMPHOCYTES # BLD AUTO: 2.11 THOUSANDS/ΜL (ref 0.6–4.47)
LYMPHOCYTES NFR BLD AUTO: 29 % (ref 14–44)
MCH RBC QN AUTO: 31.4 PG (ref 26.8–34.3)
MCHC RBC AUTO-ENTMCNC: 32.4 G/DL (ref 31.4–37.4)
MCV RBC AUTO: 97 FL (ref 82–98)
MONOCYTES # BLD AUTO: 0.65 THOUSAND/ΜL (ref 0.17–1.22)
MONOCYTES NFR BLD AUTO: 9 % (ref 4–12)
NEUTROPHILS # BLD AUTO: 4.27 THOUSANDS/ΜL (ref 1.85–7.62)
NEUTS SEG NFR BLD AUTO: 58 % (ref 43–75)
NRBC BLD AUTO-RTO: 0 /100 WBCS
PLATELET # BLD AUTO: 193 THOUSANDS/UL (ref 149–390)
PMV BLD AUTO: 9.9 FL (ref 8.9–12.7)
POTASSIUM SERPL-SCNC: 3.9 MMOL/L (ref 3.5–5.3)
PROT SERPL-MCNC: 7.2 G/DL (ref 6.4–8.2)
RBC # BLD AUTO: 4.65 MILLION/UL (ref 3.88–5.62)
SODIUM SERPL-SCNC: 141 MMOL/L (ref 136–145)
WBC # BLD AUTO: 7.36 THOUSAND/UL (ref 4.31–10.16)

## 2021-07-13 PROCEDURE — 36415 COLL VENOUS BLD VENIPUNCTURE: CPT

## 2021-07-13 PROCEDURE — 83036 HEMOGLOBIN GLYCOSYLATED A1C: CPT

## 2021-07-13 PROCEDURE — 85025 COMPLETE CBC W/AUTO DIFF WBC: CPT

## 2021-07-13 PROCEDURE — 80053 COMPREHEN METABOLIC PANEL: CPT

## 2021-07-13 PROCEDURE — 82977 ASSAY OF GGT: CPT

## 2021-08-05 ENCOUNTER — OFFICE VISIT (OUTPATIENT)
Dept: FAMILY MEDICINE CLINIC | Facility: CLINIC | Age: 71
End: 2021-08-05
Payer: MEDICARE

## 2021-08-05 VITALS
DIASTOLIC BLOOD PRESSURE: 70 MMHG | HEIGHT: 70 IN | BODY MASS INDEX: 36.88 KG/M2 | OXYGEN SATURATION: 98 % | SYSTOLIC BLOOD PRESSURE: 130 MMHG | RESPIRATION RATE: 16 BRPM | WEIGHT: 257.6 LBS | TEMPERATURE: 99 F | HEART RATE: 79 BPM

## 2021-08-05 DIAGNOSIS — R26.9 GAIT DISORDER: ICD-10-CM

## 2021-08-05 DIAGNOSIS — G62.9 NEUROPATHY: ICD-10-CM

## 2021-08-05 DIAGNOSIS — G89.4 CHRONIC PAIN DISORDER: ICD-10-CM

## 2021-08-05 DIAGNOSIS — I10 BENIGN ESSENTIAL HYPERTENSION: ICD-10-CM

## 2021-08-05 DIAGNOSIS — M54.17 LUMBOSACRAL RADICULITIS: ICD-10-CM

## 2021-08-05 DIAGNOSIS — E78.2 MIXED HYPERLIPIDEMIA: ICD-10-CM

## 2021-08-05 DIAGNOSIS — E83.10 DISORDER OF IRON METABOLISM, UNSPECIFIED: ICD-10-CM

## 2021-08-05 DIAGNOSIS — E66.01 MORBID (SEVERE) OBESITY DUE TO EXCESS CALORIES (HCC): ICD-10-CM

## 2021-08-05 DIAGNOSIS — I25.10 CORONARY ARTERY DISEASE INVOLVING NATIVE CORONARY ARTERY OF NATIVE HEART WITHOUT ANGINA PECTORIS: Primary | ICD-10-CM

## 2021-08-05 DIAGNOSIS — G60.9 IDIOPATHIC PERIPHERAL NEUROPATHY: ICD-10-CM

## 2021-08-05 PROCEDURE — 1123F ACP DISCUSS/DSCN MKR DOCD: CPT | Performed by: INTERNAL MEDICINE

## 2021-08-05 PROCEDURE — G0438 PPPS, INITIAL VISIT: HCPCS | Performed by: INTERNAL MEDICINE

## 2021-08-05 PROCEDURE — 99214 OFFICE O/P EST MOD 30 MIN: CPT | Performed by: INTERNAL MEDICINE

## 2021-08-05 RX ORDER — OXYCODONE HYDROCHLORIDE AND ACETAMINOPHEN 5; 325 MG/1; MG/1
1 TABLET ORAL EVERY 8 HOURS PRN
Qty: 30 TABLET | Refills: 0 | Status: SHIPPED | OUTPATIENT
Start: 2021-08-05 | End: 2021-12-09 | Stop reason: SDUPTHER

## 2021-08-05 NOTE — PROGRESS NOTES
Assessment and Plan:     Problem List Items Addressed This Visit        Cardiovascular and Mediastinum    Benign essential hypertension    Coronary artery disease involving native coronary artery of native heart without angina pectoris - Primary    Relevant Orders    CBC and differential    Comprehensive metabolic panel    LDL cholesterol, direct    Triglycerides    Hemoglobin A1C       Nervous and Auditory    Lumbosacral radiculitis    Peripheral neuropathy    Relevant Orders    CBC and differential    Comprehensive metabolic panel    LDL cholesterol, direct    Triglycerides    Hemoglobin A1C       Other    Chronic pain disorder    Hyperlipidemia    Relevant Orders    CBC and differential    Comprehensive metabolic panel    LDL cholesterol, direct    Triglycerides    Hemoglobin A1C    Morbid (severe) obesity due to excess calories (HCC)    Gait disorder      Other Visit Diagnoses     Neuropathy        Relevant Medications    oxyCODONE-acetaminophen (PERCOCET) 5-325 mg per tablet    Disorder of iron metabolism, unspecified         Relevant Orders    Hemoglobin A1C           Preventive health issues were discussed with patient, and age appropriate screening tests were ordered as noted in patient's After Visit Summary  Personalized health advice and appropriate referrals for health education or preventive services given if needed, as noted in patient's After Visit Summary       History of Present Illness:     Patient presents for Medicare Annual Wellness visit    Patient Care Team:  Sakina Garcia MD as PCP - MD Sakina Weber MD Marijo Crawley, MD     Problem List:     Patient Active Problem List   Diagnosis    Arthropathy of knee    Brachial radiculitis    Cervical radicular pain    Cervical radiculopathy    Cervical spondylosis with myelopathy    Chronic pain disorder    Benign essential hypertension    Gait disturbance    Hyperlipidemia    Lumbar stenosis    Lumbosacral radiculitis    Peripheral neuropathy    S/P cervical spinal fusion    Urinary urgency    Vitamin B deficiency    Vitamin D deficiency    Acute idiopathic gout of left wrist    Coronary artery disease involving native coronary artery of native heart without angina pectoris    Morbid (severe) obesity due to excess calories (HCC)    Pre-diabetes    BPH with obstruction/lower urinary tract symptoms    Hyperglycemia    Incomplete tear of rotator cuff    Gait disorder    Cervical stenosis of spinal canal    Hereditary and idiopathic peripheral neuropathy      Past Medical and Surgical History:     Past Medical History:   Diagnosis Date    Arthritis     Coronary artery disease     Hypertension     Obesity      Past Surgical History:   Procedure Laterality Date    CERVICAL DISCECTOMY      KNEE SURGERY      SPINE SURGERY        Family History:     Family History   Problem Relation Age of Onset    Cancer Mother     Dementia Mother     Cancer Father       Social History:     Social History     Socioeconomic History    Marital status:      Spouse name: None    Number of children: None    Years of education: None    Highest education level: None   Occupational History    Occupation: retired   Tobacco Use    Smoking status: Former Smoker    Smokeless tobacco: Former User    Tobacco comment: quit 10 years ago pipe, NEVER A SMOKER AS PER [employer]   Vaping Use    Vaping Use: Never used   Substance and Sexual Activity    Alcohol use: No    Drug use: No    Sexual activity: Never     Partners: Female     Birth control/protection: Abstinence   Other Topics Concern    None   Social History Narrative    Pt states he has about 2-3 cups of cold Ice Tea daily     Social Determinants of Health     Financial Resource Strain:     Difficulty of Paying Living Expenses:    Food Insecurity:     Worried About Running Out of Food in the Last Year:     Ran Out of Food in the Last Year:    Transportation Needs:     Lack of Transportation (Medical):  Lack of Transportation (Non-Medical):    Physical Activity:     Days of Exercise per Week:     Minutes of Exercise per Session:    Stress:     Feeling of Stress :    Social Connections:     Frequency of Communication with Friends and Family:     Frequency of Social Gatherings with Friends and Family:     Attends Protestant Services:     Active Member of Clubs or Organizations:     Attends Club or Organization Meetings:     Marital Status:    Intimate Partner Violence:     Fear of Current or Ex-Partner:     Emotionally Abused:     Physically Abused:     Sexually Abused:       Medications and Allergies:     Current Outpatient Medications   Medication Sig Dispense Refill    Acetaminophen (TYLENOL ARTHRITIS PAIN PO) Take by mouth daily      aspirin (ECOTRIN LOW STRENGTH) 81 mg EC tablet Take 81 mg by mouth daily      atorvastatin (LIPITOR) 40 mg tablet TAKE ONE TABLET BY MOUTH ONCE DAILY  90 tablet 3    Cholecalciferol (VITAMIN D-3) 5000 units TABS take 1 tablet daily      cyanocobalamin (VITAMIN B-12) 1000 MCG tablet Take 1,000 mcg by mouth daily      famotidine (PEPCID) 40 MG tablet Take 40 mg by mouth daily      gabapentin (NEURONTIN) 100 mg capsule TAKE FOUR CAPSULES BY MOUTH DAILY 360 capsule 0    lisinopril (ZESTRIL) 40 mg tablet TAKE ONE TABLET BY MOUTH ONCE DAILY  90 tablet 0    pantoprazole (PROTONIX) 40 mg tablet TAKE ONE TABLET BY MOUTH ONCE DAILY before breakfast  90 tablet 0    potassium chloride (KLOR-CON) 8 MEQ tablet TAKE ONE TABLET BY MOUTH ONCE DAILY  90 tablet 3    amoxicillin (AMOXIL) 500 mg capsule Only dental work (Patient not taking: Reported on 8/5/2021)      oxyCODONE-acetaminophen (PERCOCET) 5-325 mg per tablet Take 1 tablet by mouth every 8 (eight) hours as needed for moderate painMax Daily Amount: 3 tablets 30 tablet 0     No current facility-administered medications for this visit       Allergies   Allergen Reactions  Ciprofloxacin GI Intolerance    Cephalexin     Oxybutynin GI Intolerance    Solifenacin Dizziness    Tiotropium Bromide Monohydrate GI Intolerance      Immunizations:     Immunization History   Administered Date(s) Administered    INFLUENZA 11/07/2007, 10/17/2012, 10/15/2015, 08/27/2020    Influenza Split High Dose Preservative Free IM 10/15/2015    Influenza Whole 10/06/2019    Influenza, high dose seasonal 0 7 mL 10/04/2018, 10/04/2018    Pneumococcal 03/27/2019    Pneumococcal Conjugate 13-Valent 10/15/2015    Pneumococcal Conjugate PCV 7 03/27/2019    SARS-CoV-2 / COVID-19 mRNA IM (Pfizer-BioNTech) 03/10/2021, 04/07/2021      Health Maintenance:         Topic Date Due    Colorectal Cancer Screening  09/19/2022    Hepatitis C Screening  Completed         Topic Date Due    DTaP,Tdap,and Td Vaccines (1 - Tdap) Never done    Pneumococcal Vaccine: 65+ Years (2 of 2 - PPSV23) 10/15/2016    Influenza Vaccine (1) 09/01/2021      Medicare Health Risk Assessment:     /70 (BP Location: Left arm, Patient Position: Sitting, Cuff Size: Adult)   Pulse 79   Temp 99 °F (37 2 °C)   Resp 16   Ht 5' 10" (1 778 m)   Wt 117 kg (257 lb 9 6 oz)   SpO2 98%   BMI 36 96 kg/m²      Mike Gustafson is here for his Subsequent Wellness visit  Health Risk Assessment:   Patient rates overall health as fair  Patient feels that their physical health rating is same  Patient is satisfied with their life  Eyesight was rated as same  Hearing was rated as same  Patient feels that their emotional and mental health rating is same  Patients states they are never, rarely angry  Patient states they are sometimes unusually tired/fatigued  Pain experienced in the last 7 days has been some  Patient's pain rating has been 6/10  Patient states that he has experienced no weight loss or gain in last 6 months  Depression Screening:   PHQ-2 Score: 0      Fall Risk Screening:    In the past year, patient has experienced: history of falling in past year    Number of falls: 1  Injured during fall?: No    Feels unsteady when standing or walking?: Yes    Worried about falling?: Yes      Home Safety:  Patient has trouble with stairs inside or outside of their home  Patient has working smoke alarms and has working carbon monoxide detector  Home safety hazards include: none  Nutrition:   Current diet is Regular  Medications:   Patient is currently taking over-the-counter supplements  OTC medications include: see medication list  Patient is able to manage medications  Activities of Daily Living (ADLs)/Instrumental Activities of Daily Living (IADLs):   Walk and transfer into and out of bed and chair?: Yes  Dress and groom yourself?: Yes    Bathe or shower yourself?: Yes    Feed yourself? Yes  Do your laundry/housekeeping?: Yes  Manage your money, pay your bills and track your expenses?: Yes  Make your own meals?: Yes    Do your own shopping?: Yes    Previous Hospitalizations:   Any hospitalizations or ED visits within the last 12 months?: No      Advance Care Planning:   Living will: Yes    Durable POA for healthcare:  Yes    Advanced directive: Yes      PREVENTIVE SCREENINGS      Cardiovascular Screening:    General: Screening Not Indicated and History Lipid Disorder      Diabetes Screening:     General: Screening Current      Colorectal Cancer Screening:     General: Screening Current      Prostate Cancer Screening:    General: Screening Current      Abdominal Aortic Aneurysm (AAA) Screening:    Risk factors include: age between 73-67 yo and tobacco use        Lung Cancer Screening:     General: Screening Not Indicated      Hepatitis C Screening:    General: Screening Current    Screening, Brief Intervention, and Referral to Treatment (SBIRT)    Screening      Single Item Drug Screening:  How often have you used an illegal drug (including marijuana) or a prescription medication for non-medical reasons in the past year? never    Single Item Drug Screen Score: 0  Interpretation: Negative screen for possible drug use disorder    Review of Current Opioid Use    Opioid Risk Tool (ORT) Interpretation: Complete Opioid Risk Tool (ORT)      Himanshu Deluna MD

## 2021-11-29 DIAGNOSIS — G62.9 NEUROPATHY: ICD-10-CM

## 2021-11-30 RX ORDER — GABAPENTIN 100 MG/1
CAPSULE ORAL
Qty: 360 CAPSULE | Refills: 0 | Status: SHIPPED | OUTPATIENT
Start: 2021-11-30 | End: 2022-04-11

## 2021-12-01 ENCOUNTER — APPOINTMENT (OUTPATIENT)
Dept: LAB | Age: 71
End: 2021-12-01
Payer: MEDICARE

## 2021-12-01 DIAGNOSIS — G60.9 IDIOPATHIC PERIPHERAL NEUROPATHY: ICD-10-CM

## 2021-12-01 DIAGNOSIS — I25.10 CORONARY ARTERY DISEASE INVOLVING NATIVE CORONARY ARTERY OF NATIVE HEART WITHOUT ANGINA PECTORIS: ICD-10-CM

## 2021-12-01 DIAGNOSIS — E83.10 DISORDER OF IRON METABOLISM, UNSPECIFIED: ICD-10-CM

## 2021-12-01 DIAGNOSIS — E78.2 MIXED HYPERLIPIDEMIA: ICD-10-CM

## 2021-12-01 LAB
ALBUMIN SERPL BCP-MCNC: 3.8 G/DL (ref 3.5–5)
ALP SERPL-CCNC: 158 U/L (ref 46–116)
ALT SERPL W P-5'-P-CCNC: 24 U/L (ref 12–78)
ANION GAP SERPL CALCULATED.3IONS-SCNC: 6 MMOL/L (ref 4–13)
AST SERPL W P-5'-P-CCNC: 15 U/L (ref 5–45)
BASOPHILS # BLD AUTO: 0.05 THOUSANDS/ΜL (ref 0–0.1)
BASOPHILS NFR BLD AUTO: 1 % (ref 0–1)
BILIRUB SERPL-MCNC: 0.99 MG/DL (ref 0.2–1)
BUN SERPL-MCNC: 31 MG/DL (ref 5–25)
CALCIUM SERPL-MCNC: 9.3 MG/DL (ref 8.3–10.1)
CHLORIDE SERPL-SCNC: 108 MMOL/L (ref 100–108)
CO2 SERPL-SCNC: 29 MMOL/L (ref 21–32)
CREAT SERPL-MCNC: 1.04 MG/DL (ref 0.6–1.3)
EOSINOPHIL # BLD AUTO: 0.34 THOUSAND/ΜL (ref 0–0.61)
EOSINOPHIL NFR BLD AUTO: 4 % (ref 0–6)
ERYTHROCYTE [DISTWIDTH] IN BLOOD BY AUTOMATED COUNT: 14 % (ref 11.6–15.1)
EST. AVERAGE GLUCOSE BLD GHB EST-MCNC: 111 MG/DL
GFR SERPL CREATININE-BSD FRML MDRD: 72 ML/MIN/1.73SQ M
GLUCOSE P FAST SERPL-MCNC: 103 MG/DL (ref 65–99)
HBA1C MFR BLD: 5.5 %
HCT VFR BLD AUTO: 44.8 % (ref 36.5–49.3)
HGB BLD-MCNC: 14.4 G/DL (ref 12–17)
IMM GRANULOCYTES # BLD AUTO: 0.01 THOUSAND/UL (ref 0–0.2)
IMM GRANULOCYTES NFR BLD AUTO: 0 % (ref 0–2)
LDLC SERPL DIRECT ASSAY-MCNC: 87 MG/DL (ref 0–100)
LYMPHOCYTES # BLD AUTO: 2.49 THOUSANDS/ΜL (ref 0.6–4.47)
LYMPHOCYTES NFR BLD AUTO: 32 % (ref 14–44)
MCH RBC QN AUTO: 31.2 PG (ref 26.8–34.3)
MCHC RBC AUTO-ENTMCNC: 32.1 G/DL (ref 31.4–37.4)
MCV RBC AUTO: 97 FL (ref 82–98)
MONOCYTES # BLD AUTO: 0.6 THOUSAND/ΜL (ref 0.17–1.22)
MONOCYTES NFR BLD AUTO: 8 % (ref 4–12)
NEUTROPHILS # BLD AUTO: 4.33 THOUSANDS/ΜL (ref 1.85–7.62)
NEUTS SEG NFR BLD AUTO: 55 % (ref 43–75)
NRBC BLD AUTO-RTO: 0 /100 WBCS
PLATELET # BLD AUTO: 187 THOUSANDS/UL (ref 149–390)
PMV BLD AUTO: 10 FL (ref 8.9–12.7)
POTASSIUM SERPL-SCNC: 4.1 MMOL/L (ref 3.5–5.3)
PROT SERPL-MCNC: 7.5 G/DL (ref 6.4–8.2)
RBC # BLD AUTO: 4.62 MILLION/UL (ref 3.88–5.62)
SODIUM SERPL-SCNC: 143 MMOL/L (ref 136–145)
TRIGL SERPL-MCNC: 84 MG/DL
WBC # BLD AUTO: 7.82 THOUSAND/UL (ref 4.31–10.16)

## 2021-12-01 PROCEDURE — 85025 COMPLETE CBC W/AUTO DIFF WBC: CPT

## 2021-12-01 PROCEDURE — 83721 ASSAY OF BLOOD LIPOPROTEIN: CPT

## 2021-12-01 PROCEDURE — 80053 COMPREHEN METABOLIC PANEL: CPT

## 2021-12-01 PROCEDURE — 84478 ASSAY OF TRIGLYCERIDES: CPT

## 2021-12-01 PROCEDURE — 83036 HEMOGLOBIN GLYCOSYLATED A1C: CPT

## 2021-12-01 PROCEDURE — 36415 COLL VENOUS BLD VENIPUNCTURE: CPT

## 2021-12-09 ENCOUNTER — OFFICE VISIT (OUTPATIENT)
Dept: FAMILY MEDICINE CLINIC | Facility: CLINIC | Age: 71
End: 2021-12-09
Payer: MEDICARE

## 2021-12-09 VITALS
HEART RATE: 71 BPM | RESPIRATION RATE: 16 BRPM | SYSTOLIC BLOOD PRESSURE: 130 MMHG | OXYGEN SATURATION: 98 % | HEIGHT: 70 IN | DIASTOLIC BLOOD PRESSURE: 82 MMHG | TEMPERATURE: 98.2 F | BODY MASS INDEX: 36.51 KG/M2 | WEIGHT: 255 LBS

## 2021-12-09 DIAGNOSIS — F11.20 CONTINUOUS OPIOID DEPENDENCE (HCC): ICD-10-CM

## 2021-12-09 DIAGNOSIS — R39.15 URINARY URGENCY: ICD-10-CM

## 2021-12-09 DIAGNOSIS — I10 BENIGN ESSENTIAL HYPERTENSION: ICD-10-CM

## 2021-12-09 DIAGNOSIS — G60.9 IDIOPATHIC PERIPHERAL NEUROPATHY: ICD-10-CM

## 2021-12-09 DIAGNOSIS — G62.9 NEUROPATHY: Primary | ICD-10-CM

## 2021-12-09 DIAGNOSIS — E66.01 MORBID (SEVERE) OBESITY DUE TO EXCESS CALORIES (HCC): ICD-10-CM

## 2021-12-09 DIAGNOSIS — E78.2 MIXED HYPERLIPIDEMIA: ICD-10-CM

## 2021-12-09 DIAGNOSIS — M47.12 CERVICAL SPONDYLOSIS WITH MYELOPATHY: ICD-10-CM

## 2021-12-09 DIAGNOSIS — M54.17 LUMBOSACRAL RADICULITIS: ICD-10-CM

## 2021-12-09 PROCEDURE — 99214 OFFICE O/P EST MOD 30 MIN: CPT | Performed by: INTERNAL MEDICINE

## 2021-12-09 RX ORDER — OXYCODONE HYDROCHLORIDE AND ACETAMINOPHEN 5; 325 MG/1; MG/1
1 TABLET ORAL EVERY 8 HOURS PRN
Qty: 30 TABLET | Refills: 0 | Status: SHIPPED | OUTPATIENT
Start: 2021-12-09

## 2022-03-06 DIAGNOSIS — K21.9 GASTROESOPHAGEAL REFLUX DISEASE WITHOUT ESOPHAGITIS: ICD-10-CM

## 2022-03-07 RX ORDER — PANTOPRAZOLE SODIUM 40 MG/1
TABLET, DELAYED RELEASE ORAL
Qty: 90 TABLET | Refills: 0 | Status: SHIPPED | OUTPATIENT
Start: 2022-03-07 | End: 2022-06-13

## 2022-04-09 DIAGNOSIS — G62.9 NEUROPATHY: ICD-10-CM

## 2022-04-11 RX ORDER — GABAPENTIN 100 MG/1
CAPSULE ORAL
Qty: 360 CAPSULE | Refills: 0 | Status: SHIPPED | OUTPATIENT
Start: 2022-04-11

## 2022-04-14 ENCOUNTER — OFFICE VISIT (OUTPATIENT)
Dept: FAMILY MEDICINE CLINIC | Facility: CLINIC | Age: 72
End: 2022-04-14
Payer: MEDICARE

## 2022-04-14 VITALS
TEMPERATURE: 99.2 F | HEART RATE: 103 BPM | RESPIRATION RATE: 18 BRPM | DIASTOLIC BLOOD PRESSURE: 84 MMHG | SYSTOLIC BLOOD PRESSURE: 132 MMHG | HEIGHT: 70 IN | BODY MASS INDEX: 36.05 KG/M2 | WEIGHT: 251.8 LBS | OXYGEN SATURATION: 97 %

## 2022-04-14 DIAGNOSIS — Z98.1 S/P CERVICAL SPINAL FUSION: ICD-10-CM

## 2022-04-14 DIAGNOSIS — E66.01 MORBID (SEVERE) OBESITY DUE TO EXCESS CALORIES (HCC): ICD-10-CM

## 2022-04-14 DIAGNOSIS — I10 BENIGN ESSENTIAL HYPERTENSION: Primary | ICD-10-CM

## 2022-04-14 DIAGNOSIS — F11.20 CONTINUOUS OPIOID DEPENDENCE (HCC): ICD-10-CM

## 2022-04-14 DIAGNOSIS — G60.9 IDIOPATHIC PERIPHERAL NEUROPATHY: ICD-10-CM

## 2022-04-14 DIAGNOSIS — E78.2 MIXED HYPERLIPIDEMIA: ICD-10-CM

## 2022-04-14 PROCEDURE — 93000 ELECTROCARDIOGRAM COMPLETE: CPT | Performed by: INTERNAL MEDICINE

## 2022-04-14 PROCEDURE — 99214 OFFICE O/P EST MOD 30 MIN: CPT | Performed by: INTERNAL MEDICINE

## 2022-04-14 NOTE — PROGRESS NOTES
Assessment/Plan: This 70-year-old male seen for the following:     Hypertension is well controlled  Patient had a past cervical spine fusion and has in addition idiopathic path a peripheral neuropathy which are stable and without recent falls worsening  Patient has mixed hyperlipidemia which is treated well with medication  The patient's is on chronic intermittent low-dose opioids which he does not abuse  Patient has severe obesity due to sedentary lifestyle and excessive calories and will attempt to diet appropriately as he can't exercise due to his physical disabilities  Diet reviewed  Lifestyle modifications reviewed  Medications reviewed and ordered  Laboratory tests and studies reviewed and ordered  All patient's questions answered to patient satisfaction  Chief Complaint   Patient presents with    Follow-up     4 month follow up with EKG         Diagnoses and all orders for this visit:    Benign essential hypertension  -     CBC and differential; Future  -     Comprehensive metabolic panel; Future  -     LDL cholesterol, direct; Future  -     Triglycerides; Future  -     POCT ECG    Idiopathic peripheral neuropathy  -     CBC and differential; Future  -     Comprehensive metabolic panel; Future  -     LDL cholesterol, direct; Future  -     Triglycerides; Future    S/P cervical spinal fusion  -     CBC and differential; Future  -     Comprehensive metabolic panel; Future  -     LDL cholesterol, direct; Future  -     Triglycerides; Future    Mixed hyperlipidemia  -     CBC and differential; Future  -     Comprehensive metabolic panel; Future  -     LDL cholesterol, direct; Future  -     Triglycerides; Future    Continuous opioid dependence (HCC)  -     CBC and differential; Future  -     Comprehensive metabolic panel; Future  -     LDL cholesterol, direct; Future  -     Triglycerides; Future    Morbid (severe) obesity due to excess calories (HCC)        Subjective:      This 70-year-old male seen for the following:     Hypertension is well controlled  Patient had a past cervical spine fusion and has in addition idiopathic path a peripheral neuropathy which are stable and without recent falls worsening  Patient has mixed hyperlipidemia which is treated well with medication  The patient's is on chronic intermittent low-dose opioids which he does not abuse  Patient has severe obesity due to sedentary lifestyle and excessive calories and will attempt to diet appropriately as he can't exercise due to his physical disabilities  Patient ID: Kimberlee Tubbs is a 67 y o  male          Current Outpatient Medications:     Acetaminophen (TYLENOL ARTHRITIS PAIN PO), Take by mouth daily, Disp: , Rfl:     aspirin (ECOTRIN LOW STRENGTH) 81 mg EC tablet, Take 81 mg by mouth daily, Disp: , Rfl:     atorvastatin (LIPITOR) 40 mg tablet, TAKE ONE TABLET BY MOUTH ONCE DAILY , Disp: 90 tablet, Rfl: 3    Cholecalciferol (VITAMIN D-3) 5000 units TABS, take 1 tablet daily, Disp: , Rfl:     cyanocobalamin (VITAMIN B-12) 1000 MCG tablet, Take 1,000 mcg by mouth daily, Disp: , Rfl:     famotidine (PEPCID) 40 MG tablet, Take 40 mg by mouth daily, Disp: , Rfl:     gabapentin (NEURONTIN) 100 mg capsule, TAKE FOUR CAPSULES BY MOUTH DAILY, Disp: 360 capsule, Rfl: 0    lisinopril (ZESTRIL) 40 mg tablet, TAKE ONE TABLET BY MOUTH ONCE DAILY , Disp: 90 tablet, Rfl: 2    oxyCODONE-acetaminophen (PERCOCET) 5-325 mg per tablet, Take 1 tablet by mouth every 8 (eight) hours as needed for moderate pain Max Daily Amount: 3 tablets, Disp: 30 tablet, Rfl: 0    pantoprazole (PROTONIX) 40 mg tablet, TAKE ONE TABLET BY MOUTH EVERY DAY IN THE MORNING BEFORE BREAKFAST, Disp: 90 tablet, Rfl: 0    potassium chloride (KLOR-CON) 8 MEQ tablet, TAKE ONE TABLET BY MOUTH ONCE DAILY , Disp: 90 tablet, Rfl: 3    amoxicillin (AMOXIL) 500 mg capsule, Only dental work (Patient not taking: Reported on 8/5/2021), Disp: , Rfl: The following portions of the patient's history were reviewed and updated as appropriate: allergies, current medications, past family history, past medical history, past social history, past surgical history and problem list     Review of Systems   Constitutional: Negative for appetite change, fatigue, fever and unexpected weight change  HENT: Negative for rhinorrhea, sinus pressure, sinus pain, sneezing and sore throat  Eyes: Negative for visual disturbance  Respiratory: Negative for cough, chest tightness, shortness of breath and wheezing  Cardiovascular: Negative for chest pain, palpitations and leg swelling  Gastrointestinal: Negative for abdominal distention, abdominal pain, blood in stool, constipation, diarrhea, nausea and vomiting  Endocrine: Negative for polydipsia and polyuria  Genitourinary: Negative for decreased urine volume, difficulty urinating, dysuria, hematuria and urgency  Musculoskeletal: Negative for arthralgias, back pain, joint swelling and neck pain  Skin: Negative for rash  Allergic/Immunologic: Negative for environmental allergies  Neurological: Negative for tremors, weakness, light-headedness, numbness and headaches  Hematological: Does not bruise/bleed easily  Psychiatric/Behavioral: Negative for agitation, behavioral problems, confusion and dysphoric mood  The patient is not nervous/anxious            Family History   Problem Relation Age of Onset    Cancer Mother     Dementia Mother     Cancer Father        Past Medical History:   Diagnosis Date    Arthritis     Coronary artery disease     Hypertension     Obesity        Past Surgical History:   Procedure Laterality Date    CERVICAL DISCECTOMY      KNEE SURGERY      SPINE SURGERY         Social History     Socioeconomic History    Marital status:      Spouse name: None    Number of children: None    Years of education: None    Highest education level: None   Occupational History    Occupation: retired   Tobacco Use    Smoking status: Former Smoker    Smokeless tobacco: Former User    Tobacco comment: quit 10 years ago pipe, NEVER A SMOKER AS PER [employer]   Vaping Use    Vaping Use: Never used   Substance and Sexual Activity    Alcohol use: No    Drug use: No    Sexual activity: Never     Partners: Female     Birth control/protection: Abstinence   Other Topics Concern    None   Social History Narrative    Pt states he has about 2-3 cups of cold Ice Tea daily     Social Determinants of Health     Financial Resource Strain: Not on file   Food Insecurity: Not on file   Transportation Needs: Not on file   Physical Activity: Not on file   Stress: Not on file   Social Connections: Not on file   Intimate Partner Violence: Not on file   Housing Stability: Not on file       Allergies   Allergen Reactions    Ciprofloxacin GI Intolerance    Cephalexin     Oxybutynin GI Intolerance    Solifenacin Dizziness    Tiotropium Bromide Monohydrate GI Intolerance       BMI Counseling: Body mass index is 36 13 kg/m²  The BMI is above normal  Nutrition recommendations include decreasing portion sizes, moderation in carbohydrate intake, increasing intake of lean protein, reducing intake of saturated and trans fat and reducing intake of cholesterol  No pharmacotherapy was ordered  Patient referred to PCP  Rationale for BMI follow-up plan is due to patient being overweight or obese  Depression Screening and Follow-up Plan: Patient was screened for depression during today's encounter  They screened negative with a PHQ-2 score of 0  Objective:    /84 (BP Location: Left arm, Patient Position: Sitting, Cuff Size: Large)   Pulse 103   Temp 99 2 °F (37 3 °C) (Tympanic)   Resp 18   Ht 5' 10" (1 778 m)   Wt 114 kg (251 lb 12 8 oz)   SpO2 97%   BMI 36 13 kg/m²        Physical Exam  Constitutional:       General: He is not in acute distress  Appearance: He is well-developed  He is obese  HENT:      Head: Normocephalic and atraumatic  Nose: Nose normal       Mouth/Throat:      Pharynx: No oropharyngeal exudate  Eyes:      General: No scleral icterus  Conjunctiva/sclera: Conjunctivae normal       Pupils: Pupils are equal, round, and reactive to light  Neck:      Thyroid: No thyromegaly  Vascular: No JVD  Trachea: No tracheal deviation  Cardiovascular:      Rate and Rhythm: Normal rate and regular rhythm  Heart sounds: Normal heart sounds  No murmur heard  No friction rub  No gallop  Pulmonary:      Effort: Pulmonary effort is normal  No respiratory distress  Breath sounds: No wheezing or rales  Chest:      Chest wall: No tenderness  Abdominal:      General: Bowel sounds are normal  There is no distension  Palpations: Abdomen is soft  There is no mass  Tenderness: There is no abdominal tenderness  There is no guarding or rebound  Musculoskeletal:         General: No deformity  Normal range of motion  Cervical back: Normal range of motion and neck supple  Lymphadenopathy:      Cervical: No cervical adenopathy  Skin:     General: Skin is warm and dry  Findings: No rash  Neurological:      Mental Status: He is alert and oriented to person, place, and time  Cranial Nerves: No cranial nerve deficit  Coordination: Coordination normal    Psychiatric:         Behavior: Behavior normal          Thought Content:  Thought content normal          Judgment: Judgment normal

## 2022-05-08 DIAGNOSIS — E87.6 HYPOKALEMIA: ICD-10-CM

## 2022-05-09 RX ORDER — POTASSIUM CHLORIDE 600 MG/1
TABLET, FILM COATED, EXTENDED RELEASE ORAL
Qty: 90 TABLET | Refills: 0 | Status: SHIPPED | OUTPATIENT
Start: 2022-05-09 | End: 2022-05-10

## 2022-05-10 DIAGNOSIS — E87.6 HYPOKALEMIA: ICD-10-CM

## 2022-05-10 RX ORDER — POTASSIUM CHLORIDE 600 MG/1
TABLET, FILM COATED, EXTENDED RELEASE ORAL
Qty: 90 TABLET | Refills: 0 | Status: SHIPPED | OUTPATIENT
Start: 2022-05-10 | End: 2022-05-11 | Stop reason: SDUPTHER

## 2022-05-11 DIAGNOSIS — E87.6 HYPOKALEMIA: ICD-10-CM

## 2022-05-11 RX ORDER — POTASSIUM CHLORIDE 600 MG/1
8 TABLET, FILM COATED, EXTENDED RELEASE ORAL DAILY
Qty: 90 TABLET | Refills: 1 | Status: SHIPPED | OUTPATIENT
Start: 2022-05-11

## 2022-05-31 ENCOUNTER — APPOINTMENT (OUTPATIENT)
Dept: LAB | Age: 72
End: 2022-05-31
Payer: MEDICARE

## 2022-05-31 DIAGNOSIS — Z12.5 SPECIAL SCREENING FOR MALIGNANT NEOPLASM OF PROSTATE: ICD-10-CM

## 2022-05-31 LAB — PSA SERPL-MCNC: 1 NG/ML (ref 0–4)

## 2022-05-31 PROCEDURE — 36415 COLL VENOUS BLD VENIPUNCTURE: CPT

## 2022-05-31 PROCEDURE — G0103 PSA SCREENING: HCPCS

## 2022-06-12 DIAGNOSIS — K21.9 GASTROESOPHAGEAL REFLUX DISEASE WITHOUT ESOPHAGITIS: ICD-10-CM

## 2022-06-13 RX ORDER — PANTOPRAZOLE SODIUM 40 MG/1
TABLET, DELAYED RELEASE ORAL
Qty: 90 TABLET | Refills: 0 | Status: SHIPPED | OUTPATIENT
Start: 2022-06-13

## 2022-06-26 DIAGNOSIS — E78.5 HYPERLIPIDEMIA, UNSPECIFIED HYPERLIPIDEMIA TYPE: ICD-10-CM

## 2022-06-27 RX ORDER — ATORVASTATIN CALCIUM 40 MG/1
TABLET, FILM COATED ORAL
Qty: 90 TABLET | Refills: 0 | Status: SHIPPED | OUTPATIENT
Start: 2022-06-27

## 2022-07-01 ENCOUNTER — OFFICE VISIT (OUTPATIENT)
Dept: UROLOGY | Facility: MEDICAL CENTER | Age: 72
End: 2022-07-01
Payer: MEDICARE

## 2022-07-01 VITALS
HEART RATE: 73 BPM | WEIGHT: 246 LBS | DIASTOLIC BLOOD PRESSURE: 80 MMHG | SYSTOLIC BLOOD PRESSURE: 140 MMHG | BODY MASS INDEX: 35.22 KG/M2 | HEIGHT: 70 IN

## 2022-07-01 DIAGNOSIS — Z12.5 SPECIAL SCREENING FOR MALIGNANT NEOPLASM OF PROSTATE: Primary | ICD-10-CM

## 2022-07-01 DIAGNOSIS — L29.3 ITCHING OF MALE GENITALIA: ICD-10-CM

## 2022-07-01 DIAGNOSIS — N40.0 BENIGN PROSTATIC HYPERPLASIA WITHOUT LOWER URINARY TRACT SYMPTOMS: ICD-10-CM

## 2022-07-01 LAB — POST-VOID RESIDUAL VOLUME, ML POC: 31 ML

## 2022-07-01 PROCEDURE — 99213 OFFICE O/P EST LOW 20 MIN: CPT | Performed by: STUDENT IN AN ORGANIZED HEALTH CARE EDUCATION/TRAINING PROGRAM

## 2022-07-01 PROCEDURE — 51798 US URINE CAPACITY MEASURE: CPT | Performed by: STUDENT IN AN ORGANIZED HEALTH CARE EDUCATION/TRAINING PROGRAM

## 2022-07-01 RX ORDER — CLOTRIMAZOLE 1 G/ML
1 SOLUTION TOPICAL 2 TIMES DAILY
Qty: 15 ML | Refills: 0 | Status: SHIPPED | OUTPATIENT
Start: 2022-07-01 | End: 2022-07-11

## 2022-07-01 NOTE — PROGRESS NOTES
7/1/2022    Joan Caceres  1950  [de-identified]      Chief Complaint: Follow-up for BPH    History of Present Illness  Joan Caceres is a 67 y o  male presenting in follow up for BPH  The patient is not on any medications at this time  The patient was previously experiencing nocturia 5-7 times overnight  Now with modification of fluid intake he is able to go the whole night  He denies any other urinary symptoms  He has a history of a single episode of painless gross hematuria 4 years ago  He has never seen blood in his urine since this initial episode  He also complains of itchiness in the groin not improved with over-the-counter topical agents  PSA 1 May 31, 2022   PSA 0 8 May 25, 2021  PSA 0 9 May 19, 2020   PSA 0 9 January 14, 2019    Bladder Scan PVR: 31mL    AUA SYMPTOM SCORE    Flowsheet Row Most Recent Value   AUA SYMPTOM SCORE    How often have you had a sensation of not emptying your bladder completely after you finished urinating? 1   How often have you had to urinate again less than two hours after you finished urinating? 1   How often have you found you stopped and started again several times when you urinate? 0   How often have you found it difficult to postpone urination? 1   How often have you had a weak urinary stream? 1   How often have you had to push or strain to begin urination? 0   How many times did you most typically get up to urinate from the time you went to bed at night until the time you got up in the morning? 1   Quality of Life: If you were to spend the rest of your life with your urinary condition just the way it is now, how would you feel about that? 2   AUA SYMPTOM SCORE 5            Review of Systems  Review of Systems   Constitutional: Negative for chills, fever and unexpected weight change  HENT: Negative for hearing loss and sore throat  Eyes: Negative for redness and visual disturbance  Respiratory: Negative for cough and shortness of breath      Cardiovascular: Negative for chest pain, palpitations and leg swelling  Gastrointestinal: Negative for blood in stool, constipation, diarrhea, nausea and vomiting  Endocrine: Negative for cold intolerance and heat intolerance  Genitourinary:        Per HPI   Musculoskeletal: Positive for arthralgias and back pain  Negative for myalgias  Skin: Negative for color change and rash  Neurological: Negative for dizziness, seizures and headaches  Hematological: Does not bruise/bleed easily  Past Medical History  Past Medical History:   Diagnosis Date    Arthritis     Coronary artery disease     Hypertension     Obesity        Past Surgical History  Past Surgical History:   Procedure Laterality Date    CERVICAL DISCECTOMY      KNEE SURGERY      SPINE SURGERY         Physical Exam  /80   Pulse 73   Ht 5' 10" (1 778 m)   Wt 112 kg (246 lb)   BMI 35 30 kg/m²     General:  Healthy appearing male in no acute distress  Head:  Normocephalic, atraumatic  ENMT: Nares patent, moist mucous membranes  Cardiovascular:  Regular rate  Respiratory:  Patient has unlabored respirations  Abdomen:  Abdomen nondistended  Musculoskeletal:  Ambulates very carefully with a rolling walker  Neurological: Grossly intact  Psych: Normal affect  PAN:  50 g prostate, mostly flat without induration or asymmetry  Genitourinary:  Partially buried penis, bilateral testes are normal to palpation with small bilateral hydroceles, there is no erythema or rash in the groin region  Assessment  66-year-old male with a history of BPH and LUTS well managed with lifestyle modifications  The itchiness in his groin is very bothersome to him and he would like to trial an antifungal     Plan  1  Unable to void today so did not perform urine studies  2  Lotrimin topical b i d  to the groin for 10 days for genital itching  3   Return to clinic in 1 year with a PSA prior    Lyly Brown MD  [facility]

## 2022-08-02 ENCOUNTER — APPOINTMENT (OUTPATIENT)
Dept: LAB | Age: 72
End: 2022-08-02
Payer: MEDICARE

## 2022-08-02 DIAGNOSIS — E78.2 MIXED HYPERLIPIDEMIA: ICD-10-CM

## 2022-08-02 DIAGNOSIS — I10 BENIGN ESSENTIAL HYPERTENSION: ICD-10-CM

## 2022-08-02 DIAGNOSIS — G60.9 IDIOPATHIC PERIPHERAL NEUROPATHY: ICD-10-CM

## 2022-08-02 DIAGNOSIS — F11.20 CONTINUOUS OPIOID DEPENDENCE (HCC): ICD-10-CM

## 2022-08-02 DIAGNOSIS — Z98.1 S/P CERVICAL SPINAL FUSION: ICD-10-CM

## 2022-08-02 LAB
ALBUMIN SERPL BCP-MCNC: 3.6 G/DL (ref 3.5–5)
ALP SERPL-CCNC: 131 U/L (ref 46–116)
ALT SERPL W P-5'-P-CCNC: 28 U/L (ref 12–78)
ANION GAP SERPL CALCULATED.3IONS-SCNC: 3 MMOL/L (ref 4–13)
AST SERPL W P-5'-P-CCNC: 17 U/L (ref 5–45)
BASOPHILS # BLD AUTO: 0.04 THOUSANDS/ΜL (ref 0–0.1)
BASOPHILS NFR BLD AUTO: 1 % (ref 0–1)
BILIRUB SERPL-MCNC: 1 MG/DL (ref 0.2–1)
BUN SERPL-MCNC: 30 MG/DL (ref 5–25)
CALCIUM SERPL-MCNC: 9.4 MG/DL (ref 8.3–10.1)
CHLORIDE SERPL-SCNC: 107 MMOL/L (ref 96–108)
CO2 SERPL-SCNC: 31 MMOL/L (ref 21–32)
CREAT SERPL-MCNC: 1.11 MG/DL (ref 0.6–1.3)
EOSINOPHIL # BLD AUTO: 0.26 THOUSAND/ΜL (ref 0–0.61)
EOSINOPHIL NFR BLD AUTO: 3 % (ref 0–6)
ERYTHROCYTE [DISTWIDTH] IN BLOOD BY AUTOMATED COUNT: 13.5 % (ref 11.6–15.1)
GFR SERPL CREATININE-BSD FRML MDRD: 65 ML/MIN/1.73SQ M
GLUCOSE P FAST SERPL-MCNC: 98 MG/DL (ref 65–99)
HCT VFR BLD AUTO: 45.1 % (ref 36.5–49.3)
HGB BLD-MCNC: 14 G/DL (ref 12–17)
IMM GRANULOCYTES # BLD AUTO: 0.02 THOUSAND/UL (ref 0–0.2)
IMM GRANULOCYTES NFR BLD AUTO: 0 % (ref 0–2)
LDLC SERPL DIRECT ASSAY-MCNC: 65 MG/DL (ref 0–100)
LYMPHOCYTES # BLD AUTO: 2.23 THOUSANDS/ΜL (ref 0.6–4.47)
LYMPHOCYTES NFR BLD AUTO: 29 % (ref 14–44)
MCH RBC QN AUTO: 30.4 PG (ref 26.8–34.3)
MCHC RBC AUTO-ENTMCNC: 31 G/DL (ref 31.4–37.4)
MCV RBC AUTO: 98 FL (ref 82–98)
MONOCYTES # BLD AUTO: 0.66 THOUSAND/ΜL (ref 0.17–1.22)
MONOCYTES NFR BLD AUTO: 9 % (ref 4–12)
NEUTROPHILS # BLD AUTO: 4.58 THOUSANDS/ΜL (ref 1.85–7.62)
NEUTS SEG NFR BLD AUTO: 58 % (ref 43–75)
NRBC BLD AUTO-RTO: 0 /100 WBCS
PLATELET # BLD AUTO: 189 THOUSANDS/UL (ref 149–390)
PMV BLD AUTO: 10.4 FL (ref 8.9–12.7)
POTASSIUM SERPL-SCNC: 4.2 MMOL/L (ref 3.5–5.3)
PROT SERPL-MCNC: 7.1 G/DL (ref 6.4–8.4)
RBC # BLD AUTO: 4.6 MILLION/UL (ref 3.88–5.62)
SODIUM SERPL-SCNC: 141 MMOL/L (ref 135–147)
TRIGL SERPL-MCNC: 86 MG/DL
WBC # BLD AUTO: 7.79 THOUSAND/UL (ref 4.31–10.16)

## 2022-08-02 PROCEDURE — 80053 COMPREHEN METABOLIC PANEL: CPT

## 2022-08-02 PROCEDURE — 83721 ASSAY OF BLOOD LIPOPROTEIN: CPT

## 2022-08-02 PROCEDURE — 85025 COMPLETE CBC W/AUTO DIFF WBC: CPT

## 2022-08-02 PROCEDURE — 84478 ASSAY OF TRIGLYCERIDES: CPT

## 2022-08-02 PROCEDURE — 36415 COLL VENOUS BLD VENIPUNCTURE: CPT

## 2022-08-04 ENCOUNTER — RA CDI HCC (OUTPATIENT)
Dept: OTHER | Facility: HOSPITAL | Age: 72
End: 2022-08-04

## 2022-08-04 NOTE — PROGRESS NOTES
[address]  coding opportunities       Chart reviewed, no opportunity found: CHART REVIEWED, NO OPPORTUNITY FOUND        Patients Insurance     Medicare Insurance: Medicare

## 2022-08-11 ENCOUNTER — OFFICE VISIT (OUTPATIENT)
Dept: FAMILY MEDICINE CLINIC | Facility: CLINIC | Age: 72
End: 2022-08-11
Payer: MEDICARE

## 2022-08-11 VITALS
WEIGHT: 251.8 LBS | OXYGEN SATURATION: 96 % | BODY MASS INDEX: 36.05 KG/M2 | TEMPERATURE: 97.9 F | DIASTOLIC BLOOD PRESSURE: 66 MMHG | SYSTOLIC BLOOD PRESSURE: 120 MMHG | HEART RATE: 81 BPM | HEIGHT: 70 IN

## 2022-08-11 DIAGNOSIS — Z23 IMMUNIZATION DUE: Primary | ICD-10-CM

## 2022-08-11 DIAGNOSIS — Z00.00 MEDICARE ANNUAL WELLNESS VISIT, SUBSEQUENT: ICD-10-CM

## 2022-08-11 DIAGNOSIS — Z12.11 COLON CANCER SCREENING: ICD-10-CM

## 2022-08-11 DIAGNOSIS — I10 BENIGN ESSENTIAL HYPERTENSION: ICD-10-CM

## 2022-08-11 DIAGNOSIS — M47.12 CERVICAL SPONDYLOSIS WITH MYELOPATHY: ICD-10-CM

## 2022-08-11 DIAGNOSIS — G89.4 CHRONIC PAIN DISORDER: ICD-10-CM

## 2022-08-11 DIAGNOSIS — G60.9 HEREDITARY AND IDIOPATHIC PERIPHERAL NEUROPATHY: ICD-10-CM

## 2022-08-11 DIAGNOSIS — M54.12 CERVICAL RADICULAR PAIN: ICD-10-CM

## 2022-08-11 DIAGNOSIS — I25.10 CORONARY ARTERY DISEASE INVOLVING NATIVE CORONARY ARTERY OF NATIVE HEART WITHOUT ANGINA PECTORIS: ICD-10-CM

## 2022-08-11 DIAGNOSIS — R26.9 GAIT DISTURBANCE: ICD-10-CM

## 2022-08-11 PROCEDURE — G0009 ADMIN PNEUMOCOCCAL VACCINE: HCPCS

## 2022-08-11 PROCEDURE — 90677 PCV20 VACCINE IM: CPT

## 2022-08-11 PROCEDURE — 99214 OFFICE O/P EST MOD 30 MIN: CPT | Performed by: INTERNAL MEDICINE

## 2022-08-11 PROCEDURE — G0439 PPPS, SUBSEQ VISIT: HCPCS | Performed by: INTERNAL MEDICINE

## 2022-08-11 NOTE — PROGRESS NOTES
Assessment and Plan:    THIS 67YEAR-OLD MALE IS HERE FOR MEDICARE WELLNESS SUBSEQUENT  He will get a Prevnar 20 today  He will get Shingrix vaccine his local pharmacy  Influenza vaccine and COVID booster will be given when available in the fall  Patient is not interested getting colonoscopy  He had a colonoscopy 10 years ago with no polyps present he reports  He will have Cologuard this year  PSA is within normal limits  Cholesterol is well controlled on atorvastatin 40 mg daily  Hypertension is well controlled on the current medications  Patient takes a small amount of Percocet 5/325 which he takes 3 or 4 times a month  Problem List Items Addressed This Visit        Cardiovascular and Mediastinum    Benign essential hypertension    Coronary artery disease involving native coronary artery of native heart without angina pectoris       Nervous and Auditory    Cervical spondylosis with myelopathy    Hereditary and idiopathic peripheral neuropathy       Other    Cervical radicular pain    Chronic pain disorder    Gait disturbance      Other Visit Diagnoses     Medicare annual wellness visit, subsequent    -  Primary           Preventive health issues were discussed with patient, and age appropriate screening tests were ordered as noted in patient's After Visit Summary  Personalized health advice and appropriate referrals for health education or preventive services given if needed, as noted in patient's After Visit Summary       History of Present Illness:     Patient presents for a Medicare Wellness Visit    HPI   Patient Care Team:  Yesica Whitt MD as PCP - MD Yesica Duval MD Zenda Kail, MD     Review of Systems:     Review of Systems     Problem List:     Patient Active Problem List   Diagnosis    Arthropathy of knee    Brachial radiculitis    Cervical radicular pain    Cervical radiculopathy    Cervical spondylosis with myelopathy    Chronic pain disorder    Benign essential hypertension    Gait disturbance    Hyperlipidemia    Lumbar stenosis    Lumbosacral radiculitis    Peripheral neuropathy    S/P cervical spinal fusion    Urinary urgency    Vitamin B deficiency    Vitamin D deficiency    Acute idiopathic gout of left wrist    Coronary artery disease involving native coronary artery of native heart without angina pectoris    Morbid (severe) obesity due to excess calories (HCC)    Pre-diabetes    BPH with obstruction/lower urinary tract symptoms    Hyperglycemia    Incomplete tear of rotator cuff    Gait disorder    Cervical stenosis of spinal canal    Hereditary and idiopathic peripheral neuropathy    Continuous opioid dependence (HCC)      Past Medical and Surgical History:     Past Medical History:   Diagnosis Date    Arthritis     Coronary artery disease     Hypertension     Obesity      Past Surgical History:   Procedure Laterality Date    CERVICAL DISCECTOMY      KNEE SURGERY      SPINE SURGERY        Family History:     Family History   Problem Relation Age of Onset    Cancer Father     Cancer Mother     Dementia Mother       Social History:     Social History     Socioeconomic History    Marital status:      Spouse name: None    Number of children: None    Years of education: None    Highest education level: None   Occupational History    Occupation: retired   Tobacco Use    Smoking status: Former Smoker    Smokeless tobacco: Former User    Tobacco comment: quit 10 years ago pipe, NEVER A SMOKER AS PER [employer]   Vaping Use    Vaping Use: Never used   Substance and Sexual Activity    Alcohol use: No    Drug use: No    Sexual activity: Never     Partners: Female     Birth control/protection: Abstinence   Other Topics Concern    None   Social History Narrative    Pt states he has about 2-3 cups of cold Ice Tea daily     Social Determinants of Health     Financial Resource Strain: Low Risk     Difficulty of Paying Living Expenses: Not very hard   Food Insecurity: No Food Insecurity    Worried About Running Out of Food in the Last Year: Never true    Ran Out of Food in the Last Year: Never true   Transportation Needs: No Transportation Needs    Lack of Transportation (Medical): No    Lack of Transportation (Non-Medical):  No   Physical Activity: Inactive    Days of Exercise per Week: 0 days    Minutes of Exercise per Session: 0 min   Stress: No Stress Concern Present    Feeling of Stress : Not at all   Social Connections: Socially Isolated    Frequency of Communication with Friends and Family: More than three times a week    Frequency of Social Gatherings with Friends and Family: More than three times a week    Attends [employer] Services: Never    Active Member of Clubs or Organizations: No    Attends Club or Organization Meetings: Never    Marital Status:    Intimate Partner Violence: Not At Risk    Fear of Current or Ex-Partner: No    Emotionally Abused: No    Physically Abused: No    Sexually Abused: No   Housing Stability: Low Risk     Unable to Pay for Housing in the Last Year: No    Number of [place] in the Last Year: 1    Unstable Housing in the Last Year: No      Medications and Allergies:     Current Outpatient Medications   Medication Sig Dispense Refill    Acetaminophen (TYLENOL ARTHRITIS PAIN PO) Take by mouth daily      aspirin (ECOTRIN LOW STRENGTH) 81 mg EC tablet Take 81 mg by mouth daily      atorvastatin (LIPITOR) 40 mg tablet TAKE ONE TABLET BY MOUTH ONCE DAILY 90 tablet 0    Cholecalciferol (VITAMIN D-3) 5000 units TABS take 1 tablet daily      cyanocobalamin (VITAMIN B-12) 1000 MCG tablet Take 1,000 mcg by mouth daily      famotidine (PEPCID) 40 MG tablet Take 40 mg by mouth daily      gabapentin (NEURONTIN) 100 mg capsule TAKE FOUR CAPSULES BY MOUTH DAILY 360 capsule 0    lisinopril (ZESTRIL) 40 mg tablet TAKE ONE TABLET BY MOUTH ONCE DAILY 90 tablet 3    oxyCODONE-acetaminophen (PERCOCET) 5-325 mg per tablet Take 1 tablet by mouth every 8 (eight) hours as needed for moderate pain Max Daily Amount: 3 tablets 30 tablet 0    pantoprazole (PROTONIX) 40 mg tablet TAKE ONE TABLET BY MOUTH EVERY DAY IN THE MORNING before breakfast 90 tablet 0    potassium chloride (KLOR-CON) 8 MEQ tablet Take 1 tablet (8 mEq total) by mouth in the morning  90 tablet 1    amoxicillin (AMOXIL) 500 mg capsule Only dental work (Patient not taking: No sig reported)      clotrimazole 1 % external solution Apply 1 application topically 2 (two) times a day for 10 days 15 mL 0     No current facility-administered medications for this visit  Allergies   Allergen Reactions    Ciprofloxacin GI Intolerance    Cephalexin     Oxybutynin GI Intolerance    Tiotropium Bromide Monohydrate GI Intolerance      Immunizations:     Immunization History   Administered Date(s) Administered    COVID-19 PFIZER VACCINE 0 3 ML IM 03/10/2021, 04/07/2021, 10/08/2021, 04/08/2022    INFLUENZA 11/07/2007, 10/17/2012, 10/15/2015, 08/27/2020, 09/14/2021    Influenza Split High Dose Preservative Free IM 10/15/2015    Influenza Whole 10/06/2019    Influenza, high dose seasonal 0 7 mL 10/04/2018, 10/04/2018    Pneumococcal 03/27/2019    Pneumococcal Conjugate 13-Valent 10/15/2015    Pneumococcal Conjugate PCV 7 03/27/2019      Health Maintenance:         Topic Date Due    Colorectal Cancer Screening  09/19/2022    Hepatitis C Screening  Completed         Topic Date Due    Pneumococcal Vaccine: 65+ Years (2 - PPSV23 or PCV20) 10/15/2016    Influenza Vaccine (1) 09/01/2022      Medicare Screening Tests and Risk Assessments:         Health Risk Assessment:   Patient rates overall health as fair  Patient feels that their physical health rating is same  Patient is satisfied with their life  Eyesight was rated as same  Hearing was rated as same   Patient feels that their emotional and mental health rating is same  Patients states they are sometimes angry  Patient states they are sometimes unusually tired/fatigued  Pain experienced in the last 7 days has been some  Patient's pain rating has been 5/10  Patient states that he has experienced no weight loss or gain in last 6 months  Fall Risk Screening: In the past year, patient has experienced: no history of falling in past year      Home Safety:  Patient has trouble with stairs inside or outside of their home  Patient has working smoke alarms and has working carbon monoxide detector  Home safety hazards include: none  Nutrition:   Current diet is Regular  Medications:   Patient is not currently taking any over-the-counter supplements  Patient is able to manage medications  Activities of Daily Living (ADLs)/Instrumental Activities of Daily Living (IADLs):   Walk and transfer into and out of bed and chair?: Yes  Dress and groom yourself?: Yes    Bathe or shower yourself?: Yes    Feed yourself? Yes  Do your laundry/housekeeping?: Yes  Manage your money, pay your bills and track your expenses?: Yes  Make your own meals?: Yes    Do your own shopping?: Yes    Previous Hospitalizations:   Any hospitalizations or ED visits within the last 12 months?: No      Advance Care Planning:   Living will: Yes    Durable POA for healthcare:  Yes    Advanced directive: Yes      PREVENTIVE SCREENINGS      Cardiovascular Screening:    General: Screening Not Indicated and History Lipid Disorder      Diabetes Screening:     General: Screening Current      Colorectal Cancer Screening:     General: Screening Current      Prostate Cancer Screening:    General: Screening Current      Abdominal Aortic Aneurysm (AAA) Screening:    Risk factors include: age between 73-67 yo and tobacco use        Lung Cancer Screening:     General: Screening Not Indicated      Hepatitis C Screening:    General: Screening Current    Review of Current Opioid Use    Opioid Risk Tool (ORT) Interpretation: Score 0-3: Low risk for opioid misuse    No exam data present     Physical Exam:     /66 (BP Location: Right arm, Patient Position: Sitting, Cuff Size: Large)   Pulse 81   Temp 97 9 °F (36 6 °C) (Tympanic)   Ht 5' 10" (1 778 m)   Wt 114 kg (251 lb 12 8 oz)   SpO2 96%   BMI 36 13 kg/m²     Physical Exam     Sanam Peguero MD

## 2022-08-11 NOTE — PROGRESS NOTES
Assessment/Plan:   THIS 67YEAR-OLD MALE IS HERE FOR MEDICARE WELLNESS SUBSEQUENT  He will get a Prevnar 20 today  He will get Shingrix vaccine his local pharmacy  Influenza vaccine and COVID booster will be given when available in the fall  Patient is not interested getting colonoscopy  He had a colonoscopy 10 years ago with no polyps present he reports  He will have Cologuard this year  PSA is within normal limits  Cholesterol is well controlled on atorvastatin 40 mg daily  Hypertension is well controlled on the current medications  Patient takes a small amount of Percocet 5/325 which he takes 3 or 4 times a month  Diet reviewed  Lifestyle modifications reviewed  Medications reviewed and ordered  Laboratory tests and studies reviewed and ordered  All patient's questions answered to patient satisfaction  Chief Complaint   Patient presents with   [facility] Wellness Visit     AW         Diagnoses and all orders for this visit:    Medicare annual wellness visit, subsequent    Gait disturbance    Chronic pain disorder    Cervical radicular pain    Hereditary and idiopathic peripheral neuropathy    Cervical spondylosis with myelopathy    Coronary artery disease involving native coronary artery of native heart without angina pectoris    Benign essential hypertension        Subjective:      THIS 67YEAR-OLD MALE IS HERE FOR MEDICARE WELLNESS SUBSEQUENT  He will get a Prevnar 20 today  He will get Shingrix vaccine his local pharmacy  Influenza vaccine and COVID booster will be given when available in the fall  Patient is not interested getting colonoscopy  He had a colonoscopy 10 years ago with no polyps present he reports  He will have Cologuard this year  PSA is within normal limits  Cholesterol is well controlled on atorvastatin 40 mg daily  Hypertension is well controlled on the current medications    Patient takes a small amount of Percocet 5/325 which he takes 3 or 4 times a month  Patient ID: Brunilda Bloch is a 67 y o  male  Current Outpatient Medications:     Acetaminophen (TYLENOL ARTHRITIS PAIN PO), Take by mouth daily, Disp: , Rfl:     aspirin (ECOTRIN LOW STRENGTH) 81 mg EC tablet, Take 81 mg by mouth daily, Disp: , Rfl:     atorvastatin (LIPITOR) 40 mg tablet, TAKE ONE TABLET BY MOUTH ONCE DAILY, Disp: 90 tablet, Rfl: 0    Cholecalciferol (VITAMIN D-3) 5000 units TABS, take 1 tablet daily, Disp: , Rfl:     cyanocobalamin (VITAMIN B-12) 1000 MCG tablet, Take 1,000 mcg by mouth daily, Disp: , Rfl:     famotidine (PEPCID) 40 MG tablet, Take 40 mg by mouth daily, Disp: , Rfl:     gabapentin (NEURONTIN) 100 mg capsule, TAKE FOUR CAPSULES BY MOUTH DAILY, Disp: 360 capsule, Rfl: 0    lisinopril (ZESTRIL) 40 mg tablet, TAKE ONE TABLET BY MOUTH ONCE DAILY, Disp: 90 tablet, Rfl: 3    oxyCODONE-acetaminophen (PERCOCET) 5-325 mg per tablet, Take 1 tablet by mouth every 8 (eight) hours as needed for moderate pain Max Daily Amount: 3 tablets, Disp: 30 tablet, Rfl: 0    pantoprazole (PROTONIX) 40 mg tablet, TAKE ONE TABLET BY MOUTH EVERY DAY IN THE MORNING before breakfast, Disp: 90 tablet, Rfl: 0    potassium chloride (KLOR-CON) 8 MEQ tablet, Take 1 tablet (8 mEq total) by mouth in the morning , Disp: 90 tablet, Rfl: 1    amoxicillin (AMOXIL) 500 mg capsule, Only dental work (Patient not taking: No sig reported), Disp: , Rfl:     clotrimazole 1 % external solution, Apply 1 application topically 2 (two) times a day for 10 days, Disp: 15 mL, Rfl: 0    The following portions of the patient's history were reviewed and updated as appropriate: allergies, current medications, past family history, past medical history, past social history, past surgical history and problem list     Review of Systems   Constitutional: Negative for appetite change, fatigue, fever and unexpected weight change     HENT: Negative for rhinorrhea, sinus pressure, sinus pain, sneezing and sore throat  Eyes: Negative for visual disturbance  Respiratory: Negative for cough, chest tightness, shortness of breath and wheezing  Cardiovascular: Negative for chest pain, palpitations and leg swelling  Gastrointestinal: Negative for abdominal distention, abdominal pain, blood in stool, constipation, diarrhea, nausea and vomiting  Endocrine: Negative for polydipsia and polyuria  Genitourinary: Negative for decreased urine volume, difficulty urinating, dysuria, hematuria and urgency  Musculoskeletal: Negative for arthralgias, back pain, joint swelling and neck pain  Skin: Negative for rash  Allergic/Immunologic: Negative for environmental allergies  Neurological: Negative for tremors, weakness, light-headedness, numbness and headaches  Hematological: Does not bruise/bleed easily  Psychiatric/Behavioral: Negative for agitation, behavioral problems, confusion and dysphoric mood  The patient is not nervous/anxious            Family History   Problem Relation Age of Onset    Cancer Father     Cancer Mother     Dementia Mother        Past Medical History:   Diagnosis Date    Arthritis     Coronary artery disease     Hypertension     Obesity        Past Surgical History:   Procedure Laterality Date    CERVICAL DISCECTOMY      KNEE SURGERY      SPINE SURGERY         Social History     Socioeconomic History    Marital status:      Spouse name: None    Number of children: None    Years of education: None    Highest education level: None   Occupational History    Occupation: retired   Tobacco Use    Smoking status: Former Smoker    Smokeless tobacco: Former User    Tobacco comment: quit 10 years ago pipe, NEVER A SMOKER AS PER [employer]   Vaping Use    Vaping Use: Never used   Substance and Sexual Activity    Alcohol use: No    Drug use: No    Sexual activity: Never     Partners: Female     Birth control/protection: Abstinence   Other Topics Concern    None   Social History Narrative    Pt states he has about 2-3 cups of cold Ice Tea daily     Social Determinants of Health     Financial Resource Strain: Low Risk     Difficulty of Paying Living Expenses: Not very hard   Food Insecurity: No Food Insecurity    Worried About Running Out of Food in the Last Year: Never true    Remy of Food in the Last Year: Never true   Transportation Needs: No Transportation Needs    Lack of Transportation (Medical): No    Lack of Transportation (Non-Medical): No   Physical Activity: Inactive    Days of Exercise per Week: 0 days    Minutes of Exercise per Session: 0 min   Stress: No Stress Concern Present    Feeling of Stress : Not at all   Social Connections: Socially Isolated    Frequency of Communication with Friends and Family: More than three times a week    Frequency of Social Gatherings with Friends and Family: More than three times a week    Attends [employer] Services: Never    Active Member of Clubs or Organizations: No    Attends Club or Organization Meetings: Never    Marital Status:    Intimate Partner Violence: Not At Risk    Fear of Current or Ex-Partner: No    Emotionally Abused: No    Physically Abused: No    Sexually Abused: No   Housing Stability: Low Risk     Unable to Pay for Housing in the Last Year: No    Number of [place] in the Last Year: 1    Unstable Housing in the Last Year: No       Allergies   Allergen Reactions    Ciprofloxacin GI Intolerance    Cephalexin     Oxybutynin GI Intolerance    Tiotropium Bromide Monohydrate GI Intolerance         Depression Screening and Follow-up Plan: Patient was screened for depression during today's encounter  They screened negative with a PHQ-2 score of 0          Objective:    /66 (BP Location: Right arm, Patient Position: Sitting, Cuff Size: Large)   Pulse 81   Temp 97 9 °F (36 6 °C) (Tympanic)   Ht 5' 10" (1 778 m)   Wt 114 kg (251 lb 12 8 oz)   SpO2 96%   BMI 36 13 kg/m²        Physical Exam  Constitutional:       General: He is not in acute distress  Appearance: He is well-developed  HENT:      Head: Normocephalic and atraumatic  Nose: Nose normal       Mouth/Throat:      Pharynx: No oropharyngeal exudate  Eyes:      General: No scleral icterus  Conjunctiva/sclera: Conjunctivae normal       Pupils: Pupils are equal, round, and reactive to light  Neck:      Thyroid: No thyromegaly  Vascular: No JVD  Trachea: No tracheal deviation  Cardiovascular:      Rate and Rhythm: Normal rate and regular rhythm  Heart sounds: Normal heart sounds  No murmur heard  No friction rub  No gallop  Pulmonary:      Effort: Pulmonary effort is normal  No respiratory distress  Breath sounds: No wheezing or rales  Chest:      Chest wall: No tenderness  Abdominal:      General: Bowel sounds are normal  There is no distension  Palpations: Abdomen is soft  There is no mass  Tenderness: There is no abdominal tenderness  There is no guarding or rebound  Musculoskeletal:         General: No deformity  Normal range of motion  Cervical back: Normal range of motion and neck supple  Lymphadenopathy:      Cervical: No cervical adenopathy  Skin:     General: Skin is warm and dry  Findings: No rash  Neurological:      Mental Status: He is alert and oriented to person, place, and time  Cranial Nerves: No cranial nerve deficit  Coordination: Coordination normal    Psychiatric:         Behavior: Behavior normal          Thought Content:  Thought content normal          Judgment: Judgment normal

## 2022-09-07 LAB — COLOGUARD RESULT REPORTABLE: NEGATIVE

## 2022-09-11 DIAGNOSIS — K21.9 GASTROESOPHAGEAL REFLUX DISEASE WITHOUT ESOPHAGITIS: ICD-10-CM

## 2022-09-12 RX ORDER — PANTOPRAZOLE SODIUM 40 MG/1
TABLET, DELAYED RELEASE ORAL
Qty: 90 TABLET | Refills: 0 | Status: SHIPPED | OUTPATIENT
Start: 2022-09-12

## 2022-09-25 DIAGNOSIS — E78.5 HYPERLIPIDEMIA, UNSPECIFIED HYPERLIPIDEMIA TYPE: ICD-10-CM

## 2022-09-26 RX ORDER — ATORVASTATIN CALCIUM 40 MG/1
TABLET, FILM COATED ORAL
Qty: 90 TABLET | Refills: 0 | Status: SHIPPED | OUTPATIENT
Start: 2022-09-26

## 2022-10-19 DIAGNOSIS — G62.9 NEUROPATHY: ICD-10-CM

## 2022-10-19 RX ORDER — GABAPENTIN 100 MG/1
100 CAPSULE ORAL DAILY
Qty: 360 CAPSULE | Refills: 0 | Status: SHIPPED | OUTPATIENT
Start: 2022-10-19

## 2022-11-07 ENCOUNTER — APPOINTMENT (OUTPATIENT)
Dept: LAB | Age: 72
End: 2022-11-07

## 2022-11-07 DIAGNOSIS — I10 BENIGN ESSENTIAL HYPERTENSION: ICD-10-CM

## 2022-11-07 DIAGNOSIS — Z00.00 MEDICARE ANNUAL WELLNESS VISIT, SUBSEQUENT: ICD-10-CM

## 2022-11-07 LAB
ALBUMIN SERPL BCP-MCNC: 3.6 G/DL (ref 3.5–5)
ALP SERPL-CCNC: 146 U/L (ref 46–116)
ALT SERPL W P-5'-P-CCNC: 28 U/L (ref 12–78)
ANION GAP SERPL CALCULATED.3IONS-SCNC: 4 MMOL/L (ref 4–13)
AST SERPL W P-5'-P-CCNC: 19 U/L (ref 5–45)
BASOPHILS # BLD AUTO: 0.04 THOUSANDS/ÂΜL (ref 0–0.1)
BASOPHILS NFR BLD AUTO: 1 % (ref 0–1)
BILIRUB SERPL-MCNC: 0.88 MG/DL (ref 0.2–1)
BUN SERPL-MCNC: 32 MG/DL (ref 5–25)
CALCIUM SERPL-MCNC: 9.4 MG/DL (ref 8.3–10.1)
CHLORIDE SERPL-SCNC: 107 MMOL/L (ref 96–108)
CO2 SERPL-SCNC: 30 MMOL/L (ref 21–32)
CREAT SERPL-MCNC: 1.11 MG/DL (ref 0.6–1.3)
EOSINOPHIL # BLD AUTO: 0.27 THOUSAND/ÂΜL (ref 0–0.61)
EOSINOPHIL NFR BLD AUTO: 4 % (ref 0–6)
ERYTHROCYTE [DISTWIDTH] IN BLOOD BY AUTOMATED COUNT: 14 % (ref 11.6–15.1)
GFR SERPL CREATININE-BSD FRML MDRD: 65 ML/MIN/1.73SQ M
GLUCOSE P FAST SERPL-MCNC: 107 MG/DL (ref 65–99)
HCT VFR BLD AUTO: 45.7 % (ref 36.5–49.3)
HGB BLD-MCNC: 14.3 G/DL (ref 12–17)
IMM GRANULOCYTES # BLD AUTO: 0.02 THOUSAND/UL (ref 0–0.2)
IMM GRANULOCYTES NFR BLD AUTO: 0 % (ref 0–2)
LDLC SERPL DIRECT ASSAY-MCNC: 79 MG/DL (ref 0–100)
LYMPHOCYTES # BLD AUTO: 2.51 THOUSANDS/ÂΜL (ref 0.6–4.47)
LYMPHOCYTES NFR BLD AUTO: 33 % (ref 14–44)
MCH RBC QN AUTO: 31.4 PG (ref 26.8–34.3)
MCHC RBC AUTO-ENTMCNC: 31.3 G/DL (ref 31.4–37.4)
MCV RBC AUTO: 100 FL (ref 82–98)
MONOCYTES # BLD AUTO: 0.66 THOUSAND/ÂΜL (ref 0.17–1.22)
MONOCYTES NFR BLD AUTO: 9 % (ref 4–12)
NEUTROPHILS # BLD AUTO: 4.12 THOUSANDS/ÂΜL (ref 1.85–7.62)
NEUTS SEG NFR BLD AUTO: 53 % (ref 43–75)
NRBC BLD AUTO-RTO: 0 /100 WBCS
PLATELET # BLD AUTO: 196 THOUSANDS/UL (ref 149–390)
PMV BLD AUTO: 10.4 FL (ref 8.9–12.7)
POTASSIUM SERPL-SCNC: 4 MMOL/L (ref 3.5–5.3)
PROT SERPL-MCNC: 7.6 G/DL (ref 6.4–8.4)
RBC # BLD AUTO: 4.56 MILLION/UL (ref 3.88–5.62)
SODIUM SERPL-SCNC: 141 MMOL/L (ref 135–147)
TRIGL SERPL-MCNC: 91 MG/DL
WBC # BLD AUTO: 7.62 THOUSAND/UL (ref 4.31–10.16)

## 2022-11-15 ENCOUNTER — OFFICE VISIT (OUTPATIENT)
Dept: FAMILY MEDICINE CLINIC | Facility: CLINIC | Age: 72
End: 2022-11-15

## 2022-11-15 VITALS
TEMPERATURE: 96.2 F | SYSTOLIC BLOOD PRESSURE: 140 MMHG | BODY MASS INDEX: 35.33 KG/M2 | RESPIRATION RATE: 16 BRPM | HEIGHT: 70 IN | DIASTOLIC BLOOD PRESSURE: 70 MMHG | WEIGHT: 246.8 LBS

## 2022-11-15 DIAGNOSIS — G89.4 CHRONIC PAIN DISORDER: ICD-10-CM

## 2022-11-15 DIAGNOSIS — M48.062 SPINAL STENOSIS OF LUMBAR REGION WITH NEUROGENIC CLAUDICATION: ICD-10-CM

## 2022-11-15 DIAGNOSIS — F11.20 CONTINUOUS OPIOID DEPENDENCE (HCC): ICD-10-CM

## 2022-11-15 DIAGNOSIS — Z98.1 S/P CERVICAL SPINAL FUSION: ICD-10-CM

## 2022-11-15 DIAGNOSIS — E66.01 MORBID (SEVERE) OBESITY DUE TO EXCESS CALORIES (HCC): ICD-10-CM

## 2022-11-15 DIAGNOSIS — G62.9 NEUROPATHY: ICD-10-CM

## 2022-11-15 DIAGNOSIS — G60.9 HEREDITARY AND IDIOPATHIC PERIPHERAL NEUROPATHY: Primary | ICD-10-CM

## 2022-11-15 DIAGNOSIS — R73.03 PRE-DIABETES: ICD-10-CM

## 2022-11-15 RX ORDER — GABAPENTIN 100 MG/1
100 CAPSULE ORAL 4 TIMES DAILY
Qty: 360 CAPSULE | Refills: 3 | Status: SHIPPED | OUTPATIENT
Start: 2022-11-15

## 2022-11-15 RX ORDER — OXYCODONE HYDROCHLORIDE AND ACETAMINOPHEN 5; 325 MG/1; MG/1
1 TABLET ORAL EVERY 8 HOURS PRN
Qty: 30 TABLET | Refills: 0 | Status: SHIPPED | OUTPATIENT
Start: 2022-11-15

## 2022-11-15 NOTE — PROGRESS NOTES
Assessment/Plan: This 79-year-old male is here for a follow-up visit he has idiopathic peripheral neuropathy spinal stenosis of lumbar region with claudication neurogenic, severe obesity with a recent 5 lb weight loss  Pre diabetes with fasting blood sugar 107  He takes occasional oxycodone and is very cautious with the use of oxycodone/acetaminophen 5/325 3 to 4 times a month  Diet reviewed  Lifestyle modifications reviewed  Medications reviewed and ordered  Laboratory tests and studies reviewed and ordered  All patient's questions answered to patient satisfaction  Chief Complaint   Patient presents with   • Follow-up         Diagnoses and all orders for this visit:    Hereditary and idiopathic peripheral neuropathy    Chronic pain disorder    Spinal stenosis of lumbar region with neurogenic claudication    S/P cervical spinal fusion    Morbid (severe) obesity due to excess calories (HCC)    Pre-diabetes    Continuous opioid dependence (HCC)    Neuropathy        Subjective: This 79-year-old male is here for a follow-up visit he has idiopathic peripheral neuropathy spinal stenosis of lumbar region with claudication neurogenic, severe obesity with a recent 5 lb weight loss  Pre diabetes with fasting blood sugar 107  He takes occasional oxycodone and is very cautious with the use of oxycodone/acetaminophen 5/325 3 to 4 times a month  Patient ID: Audrey Delarosa is a 67 y o  male          Current Outpatient Medications:   •  Acetaminophen (TYLENOL ARTHRITIS PAIN PO), Take by mouth daily, Disp: , Rfl:   •  aspirin (ECOTRIN LOW STRENGTH) 81 mg EC tablet, Take 81 mg by mouth daily, Disp: , Rfl:   •  atorvastatin (LIPITOR) 40 mg tablet, TAKE ONE TABLET BY MOUTH ONCE DAILY, Disp: 90 tablet, Rfl: 0  •  Cholecalciferol (VITAMIN D-3) 5000 units TABS, take 1 tablet daily, Disp: , Rfl:   •  cyanocobalamin (VITAMIN B-12) 1000 MCG tablet, Take 1,000 mcg by mouth daily, Disp: , Rfl:   •  famotidine (PEPCID) 40 MG tablet, Take 40 mg by mouth daily, Disp: , Rfl:   •  gabapentin (NEURONTIN) 100 mg capsule, Take 1 capsule (100 mg total) by mouth daily, Disp: 360 capsule, Rfl: 0  •  lisinopril (ZESTRIL) 40 mg tablet, TAKE ONE TABLET BY MOUTH ONCE DAILY, Disp: 90 tablet, Rfl: 3  •  oxyCODONE-acetaminophen (PERCOCET) 5-325 mg per tablet, Take 1 tablet by mouth every 8 (eight) hours as needed for moderate pain Max Daily Amount: 3 tablets, Disp: 30 tablet, Rfl: 0  •  pantoprazole (PROTONIX) 40 mg tablet, TAKE ONE TABLET BY MOUTH every day in the morning before breakfast, Disp: 90 tablet, Rfl: 0  •  potassium chloride (KLOR-CON) 8 MEQ tablet, Take 1 tablet (8 mEq total) by mouth in the morning , Disp: 90 tablet, Rfl: 1  •  amoxicillin (AMOXIL) 500 mg capsule, Only dental work (Patient not taking: No sig reported), Disp: , Rfl:   •  clotrimazole 1 % external solution, Apply 1 application topically 2 (two) times a day for 10 days, Disp: 15 mL, Rfl: 0    The following portions of the patient's history were reviewed and updated as appropriate: allergies, current medications, past family history, past medical history, past social history, past surgical history and problem list     Review of Systems   Constitutional: Negative for appetite change, fatigue, fever and unexpected weight change  HENT: Negative for rhinorrhea, sinus pressure, sinus pain, sneezing and sore throat  Eyes: Negative for visual disturbance  Respiratory: Negative for cough, chest tightness, shortness of breath and wheezing  Cardiovascular: Negative for chest pain, palpitations and leg swelling  Gastrointestinal: Negative for abdominal distention, abdominal pain, blood in stool, constipation, diarrhea, nausea and vomiting  Endocrine: Negative for polydipsia and polyuria  Genitourinary: Negative for decreased urine volume, difficulty urinating, dysuria, hematuria and urgency  Musculoskeletal: Positive for arthralgias, back pain and gait problem   Negative for joint swelling and neck pain  Skin: Negative for rash  Allergic/Immunologic: Negative for environmental allergies  Neurological: Positive for numbness  Negative for tremors, weakness, light-headedness and headaches  Hematological: Does not bruise/bleed easily  Psychiatric/Behavioral: Negative for agitation, behavioral problems, confusion and dysphoric mood  The patient is not nervous/anxious  Family History   Problem Relation Age of Onset   • Cancer Father    • Cancer Mother    • Dementia Mother        Past Medical History:   Diagnosis Date   • Arthritis    • Coronary artery disease    • Hypertension    • Obesity        Past Surgical History:   Procedure Laterality Date   • CERVICAL DISCECTOMY     • KNEE SURGERY     • SPINE SURGERY         Social History     Socioeconomic History   • Marital status:      Spouse name: None   • Number of children: None   • Years of education: None   • Highest education level: None   Occupational History   • Occupation: retired   Tobacco Use   • Smoking status: Former Smoker   • Smokeless tobacco: Former User   • Tobacco comment: quit 10 years ago pipe, NEVER A SMOKER AS PER [employer]   Vaping Use   • Vaping Use: Never used   Substance and Sexual Activity   • Alcohol use: No   • Drug use: No   • Sexual activity: Never     Partners: Female     Birth control/protection: Abstinence   Other Topics Concern   • None   Social History Narrative    Pt states he has about 2-3 cups of cold Ice Tea daily     Social Determinants of Health     Financial Resource Strain: Low Risk    • Difficulty of Paying Living Expenses: Not very hard   Food Insecurity: No Food Insecurity   • Worried About Running Out of Food in the Last Year: Never true   • Ran Out of Food in the Last Year: Never true   Transportation Needs: No Transportation Needs   • Lack of Transportation (Medical): No   • Lack of Transportation (Non-Medical):  No   Physical Activity: Inactive   • Days of Exercise per Week: 0 days   • Minutes of Exercise per Session: 0 min   Stress: No Stress Concern Present   • Feeling of Stress : Not at all   Social Connections: Socially Isolated   • Frequency of Communication with Friends and Family: More than three times a week   • Frequency of Social Gatherings with Friends and Family: More than three times a week   • Attends [employer]ism Services: Never   • Active Member of Clubs or Organizations: No   • Attends Club or Organization Meetings: Never   • Marital Status:    Intimate Partner Violence: Not At Risk   • Fear of Current or Ex-Partner: No   • Emotionally Abused: No   • Physically Abused: No   • Sexually Abused: No   Housing Stability: Low Risk    • Unable to Pay for Housing in the Last Year: No   • Number of [place] in the Last Year: 1   • Unstable Housing in the Last Year: No       Allergies   Allergen Reactions   • Ciprofloxacin GI Intolerance   • Cephalexin    • Oxybutynin GI Intolerance   • Tiotropium Bromide Monohydrate GI Intolerance            Objective:    /70 (BP Location: Left arm, Patient Position: Sitting, Cuff Size: Large)   Temp (!) 96 2 °F (35 7 °C) (Tympanic)   Resp 16   Ht 5' 10" (1 778 m)   Wt 112 kg (246 lb 12 8 oz)   BMI 35 41 kg/m²        Physical Exam  Constitutional:       General: He is not in acute distress  Appearance: He is well-developed  HENT:      Head: Normocephalic and atraumatic  Nose: Nose normal       Mouth/Throat:      Pharynx: No oropharyngeal exudate  Eyes:      General: No scleral icterus  Conjunctiva/sclera: Conjunctivae normal       Pupils: Pupils are equal, round, and reactive to light  Neck:      Thyroid: No thyromegaly  Vascular: No JVD  Trachea: No tracheal deviation  Cardiovascular:      Rate and Rhythm: Normal rate and regular rhythm  Heart sounds: Normal heart sounds  No murmur heard  No friction rub  No gallop     Pulmonary:      Effort: Pulmonary effort is normal  No respiratory distress  Breath sounds: No wheezing or rales  Chest:      Chest wall: No tenderness  Abdominal:      General: Bowel sounds are normal  There is no distension  Palpations: Abdomen is soft  There is no mass  Tenderness: There is no abdominal tenderness  There is no guarding or rebound  Musculoskeletal:         General: No deformity  Normal range of motion  Cervical back: Normal range of motion and neck supple  Lymphadenopathy:      Cervical: No cervical adenopathy  Skin:     General: Skin is warm and dry  Findings: No rash  Neurological:      Mental Status: He is alert and oriented to person, place, and time  Cranial Nerves: No cranial nerve deficit  Coordination: Coordination normal    Psychiatric:         Behavior: Behavior normal          Thought Content:  Thought content normal          Judgment: Judgment normal

## 2022-12-11 DIAGNOSIS — K21.9 GASTROESOPHAGEAL REFLUX DISEASE WITHOUT ESOPHAGITIS: ICD-10-CM

## 2022-12-12 RX ORDER — PANTOPRAZOLE SODIUM 40 MG/1
TABLET, DELAYED RELEASE ORAL
Qty: 90 TABLET | Refills: 0 | Status: SHIPPED | OUTPATIENT
Start: 2022-12-12

## 2022-12-25 DIAGNOSIS — E78.5 HYPERLIPIDEMIA, UNSPECIFIED HYPERLIPIDEMIA TYPE: ICD-10-CM

## 2022-12-27 RX ORDER — ATORVASTATIN CALCIUM 40 MG/1
TABLET, FILM COATED ORAL
Qty: 90 TABLET | Refills: 0 | Status: SHIPPED | OUTPATIENT
Start: 2022-12-27

## 2023-01-17 DIAGNOSIS — G62.9 NEUROPATHY: ICD-10-CM

## 2023-01-17 RX ORDER — OXYCODONE HYDROCHLORIDE AND ACETAMINOPHEN 5; 325 MG/1; MG/1
TABLET ORAL
Qty: 30 TABLET | Refills: 0 | Status: SHIPPED | OUTPATIENT
Start: 2023-01-17

## 2023-03-12 DIAGNOSIS — K21.9 GASTROESOPHAGEAL REFLUX DISEASE WITHOUT ESOPHAGITIS: ICD-10-CM

## 2023-03-13 RX ORDER — PANTOPRAZOLE SODIUM 40 MG/1
TABLET, DELAYED RELEASE ORAL
Qty: 90 TABLET | Refills: 0 | Status: SHIPPED | OUTPATIENT
Start: 2023-03-13

## 2023-03-26 DIAGNOSIS — E78.5 HYPERLIPIDEMIA, UNSPECIFIED HYPERLIPIDEMIA TYPE: ICD-10-CM

## 2023-03-27 RX ORDER — ATORVASTATIN CALCIUM 40 MG/1
TABLET, FILM COATED ORAL
Qty: 90 TABLET | Refills: 0 | Status: SHIPPED | OUTPATIENT
Start: 2023-03-27

## 2023-04-18 PROBLEM — K21.9 GASTROESOPHAGEAL REFLUX DISEASE WITHOUT ESOPHAGITIS: Status: ACTIVE | Noted: 2023-04-18

## 2023-04-18 PROBLEM — Z80.0 FAMILY HISTORY OF COLON CANCER: Status: ACTIVE | Noted: 2023-04-18

## 2023-05-21 DIAGNOSIS — E87.6 HYPOKALEMIA: ICD-10-CM

## 2023-05-22 RX ORDER — POTASSIUM CHLORIDE 600 MG/1
TABLET, FILM COATED, EXTENDED RELEASE ORAL
Qty: 90 TABLET | Refills: 0 | Status: SHIPPED | OUTPATIENT
Start: 2023-05-22

## 2023-06-20 DIAGNOSIS — E78.5 HYPERLIPIDEMIA, UNSPECIFIED HYPERLIPIDEMIA TYPE: ICD-10-CM

## 2023-06-20 RX ORDER — ATORVASTATIN CALCIUM 40 MG/1
TABLET, FILM COATED ORAL
Qty: 90 TABLET | Refills: 0 | Status: SHIPPED | OUTPATIENT
Start: 2023-06-20

## 2023-08-08 ENCOUNTER — LAB (OUTPATIENT)
Dept: LAB | Age: 73
End: 2023-08-08
Payer: MEDICARE

## 2023-08-08 DIAGNOSIS — Z12.5 SPECIAL SCREENING FOR MALIGNANT NEOPLASM OF PROSTATE: ICD-10-CM

## 2023-08-08 LAB — PSA SERPL-MCNC: 1.78 NG/ML (ref 0–4)

## 2023-08-08 PROCEDURE — 36415 COLL VENOUS BLD VENIPUNCTURE: CPT

## 2023-08-08 PROCEDURE — G0103 PSA SCREENING: HCPCS

## 2023-08-10 ENCOUNTER — TELEPHONE (OUTPATIENT)
Dept: FAMILY MEDICINE CLINIC | Facility: CLINIC | Age: 73
End: 2023-08-10

## 2023-08-10 NOTE — RESULT ENCOUNTER NOTE
Please call patient. PSA increased slightly. IF symptoms worsen and he would like, recommend discussion with Urology. Otherwise Will discuss more fully at next visit.

## 2023-08-10 NOTE — TELEPHONE ENCOUNTER
Patient called back and is aware of PSA results and has an appointment with his urologist in a few weeks.

## 2023-08-17 ENCOUNTER — OFFICE VISIT (OUTPATIENT)
Dept: UROLOGY | Facility: MEDICAL CENTER | Age: 73
End: 2023-08-17
Payer: MEDICARE

## 2023-08-17 VITALS
SYSTOLIC BLOOD PRESSURE: 120 MMHG | DIASTOLIC BLOOD PRESSURE: 76 MMHG | OXYGEN SATURATION: 98 % | WEIGHT: 236 LBS | HEIGHT: 70 IN | BODY MASS INDEX: 33.79 KG/M2 | HEART RATE: 75 BPM

## 2023-08-17 DIAGNOSIS — N40.0 BENIGN PROSTATIC HYPERPLASIA WITHOUT LOWER URINARY TRACT SYMPTOMS: Primary | ICD-10-CM

## 2023-08-17 DIAGNOSIS — L29.3 ITCHING OF MALE GENITALIA: ICD-10-CM

## 2023-08-17 DIAGNOSIS — Z12.5 SPECIAL SCREENING FOR MALIGNANT NEOPLASM OF PROSTATE: ICD-10-CM

## 2023-08-17 PROCEDURE — 99214 OFFICE O/P EST MOD 30 MIN: CPT | Performed by: NURSE PRACTITIONER

## 2023-08-17 RX ORDER — NYSTATIN AND TRIAMCINOLONE ACETONIDE 100000; 1 [USP'U]/G; MG/G
OINTMENT TOPICAL 2 TIMES DAILY
Qty: 30 G | Refills: 2 | Status: SHIPPED | OUTPATIENT
Start: 2023-08-17

## 2023-08-17 NOTE — PROGRESS NOTES
8/17/2023    Assessment and Plan    68 y.o. male managed by our office. 1.  Benign prostatic hyperplasia  · PSA performed 8/8/2023 resulted 1.78  · PAN-prostate approximate 40 g with no nodules  · Continue to monitor/progression of lower urinary tract symptoms  · Avoid bladder irritating foods and beverages    2. Fungal groin rash/irritation  · Prescription for Mycolog ointment provided      History of Present Illness  Theador Daya is a 68 y.o. male here for follow up evaluation of urinary tract symptoms secondary to benign prostatic hyperplasia. Patient reports no significant change to his lower urinary tract symptoms and is quite pleased with his current urinary status. He reports occasional urinary frequency and urgency especially at night if he drinks extra water during the evening. He reports since initiation of gabapentin he is able to sleep only through the night without episodes of nocturia. He continues to report occasional groin/testicular irritation/itching but has no visible rash or skin abnormality. He denies changes to his general health since his last office evaluation. PSA, Total   Latest Ref Rng 0.00 - 4.00 ng/mL   1/14/2019 0.9    5/19/2020 0.9    5/25/2021 0.8    5/31/2022 1.0    8/8/2023 1.78        Review of Systems   Constitutional: Negative for chills and fever. Respiratory: Negative for cough and shortness of breath. Cardiovascular: Negative for chest pain. Gastrointestinal: Negative for abdominal distention, abdominal pain, blood in stool, nausea and vomiting. Genitourinary: Negative for difficulty urinating, dysuria, enuresis, flank pain, frequency, hematuria and urgency. Skin: Negative for rash.        AUA SYMPTOM SCORE    Flowsheet Row Most Recent Value   AUA SYMPTOM SCORE    How often have you had a sensation of not emptying your bladder completely after you finished urinating? 1 (P)     How often have you had to urinate again less than two hours after you finished urinating? 1 (P)     How often have you found you stopped and started again several times when you urinate? 0 (P)     How often have you found it difficult to postpone urination? 0 (P)     How often have you had a weak urinary stream? 1 (P)     How often have you had to push or strain to begin urination? 0 (P)     How many times did you most typically get up to urinate from the time you went to bed at night until the time you got up in the morning? 0 (P)     Quality of Life: If you were to spend the rest of your life with your urinary condition just the way it is now, how would you feel about that? 2 (P)     AUA SYMPTOM SCORE 3 (P)              Past Medical History  Past Medical History:   Diagnosis Date   • Arthritis    • Coronary artery disease    • Hypertension    • Obesity        Past Social History  Past Surgical History:   Procedure Laterality Date   • CERVICAL DISCECTOMY     • KNEE SURGERY     • SPINE SURGERY       Social History     Tobacco Use   Smoking Status Former   Smokeless Tobacco Former   Tobacco Comments    quit 10 years ago pipe, NEVER A SMOKER AS PER [employer]       Past Family History  Family History   Problem Relation Age of Onset   • Cancer Father    • Cancer Mother    • Dementia Mother        Past Social history  Social History     Socioeconomic History   • Marital status:      Spouse name: Not on file   • Number of children: Not on file   • Years of education: Not on file   • Highest education level: Not on file   Occupational History   • Occupation: retired   Tobacco Use   • Smoking status: Former   • Smokeless tobacco: Former   • Tobacco comments:     quit 10 years ago pipe, NEVER A SMOKER AS PER [employer]GEN   Vaping Use   • Vaping Use: Never used   Substance and Sexual Activity   • Alcohol use: No   • Drug use: No   • Sexual activity: Never     Partners: Female     Birth control/protection: Abstinence   Other Topics Concern   • Not on file   Social History Narrative    Pt states he has about 2-3 cups of cold Ice Tea daily     Social Determinants of Health     Financial Resource Strain: Low Risk  (8/11/2022)    Overall Financial Resource Strain (CARDIA)    • Difficulty of Paying Living Expenses: Not very hard   Food Insecurity: No Food Insecurity (8/11/2022)    Hunger Vital Sign    • Worried About Running Out of Food in the Last Year: Never true    • Ran Out of Food in the Last Year: Never true   Transportation Needs: No Transportation Needs (8/11/2022)    PRAPARE - Transportation    • Lack of Transportation (Medical): No    • Lack of Transportation (Non-Medical):  No   Physical Activity: Inactive (8/11/2022)    Exercise Vital Sign    • Days of Exercise per Week: 0 days    • Minutes of Exercise per Session: 0 min   Stress: No Stress Concern Present (8/11/2022)    [facility]    • Feeling of Stress : Not at all   Social Connections: Socially Isolated (8/11/2022)    Social Connection and Isolation Panel [NHANES]    • Frequency of Communication with Friends and Family: More than three times a week    • Frequency of Social Gatherings with Friends and Family: More than three times a week    • Attends Faith Services: Never    • Active Member of Clubs or Organizations: No    • Attends Club or Organization Meetings: Never    • Marital Status:    Intimate Partner Violence: Not At Risk (8/11/2022)    Humiliation, Afraid, Rape, and Kick questionnaire    • Fear of Current or Ex-Partner: No    • Emotionally Abused: No    • Physically Abused: No    • Sexually Abused: No   Housing Stability: [address]  (8/11/2022)    Housing Stability Vital Sign    • Unable to Pay for Housing in the Last Year: No    • Number of [address] in the Last Year: 1    • Unstable Housing in the Last Year: No       Current Medications  Current Outpatient Medications   Medication Sig Dispense Refill   • Acetaminophen (TYLENOL ARTHRITIS PAIN PO) Take by mouth daily     • aspirin (ECOTRIN LOW STRENGTH) 81 mg EC tablet Take 81 mg by mouth daily     • atorvastatin (LIPITOR) 40 mg tablet TAKE 1 TABLET BY MOUTH ONCE DAILY 90 tablet 0   • Cholecalciferol (VITAMIN D-3) 5000 units TABS take 1 tablet daily     • cyanocobalamin (VITAMIN B-12) 1000 MCG tablet Take 1,000 mcg by mouth daily     • gabapentin (NEURONTIN) 100 mg capsule Take 1 capsule (100 mg total) by mouth 4 (four) times a day 360 capsule 3   • lisinopril (ZESTRIL) 40 mg tablet TAKE ONE TABLET BY MOUTH ONCE DAILY 90 tablet 3   • nystatin-triamcinolone (MYCOLOG-II) ointment Apply topically 2 (two) times a day 30 g 2   • oxyCODONE-acetaminophen (PERCOCET) 5-325 mg per tablet Take 1 tablet by mouth every 8 (eight) hours as needed for moderate pain Max Daily Amount: 3 tablets 60 tablet 0   • pantoprazole (PROTONIX) 40 mg tablet TAKE 1 TABLET BY MOUTH EVERY DAY IN THE MORNING BEFORE BREAKFAST 90 tablet 0   • potassium chloride (KLOR-CON) 8 MEQ tablet TAKE ONE TABLET BY MOUTH in the morning (Patient taking differently: Take 8 mEq by mouth daily) 90 tablet 0   • amoxicillin (AMOXIL) 500 mg capsule Only dental work (Patient not taking: Reported on 8/17/2023)     • famotidine (PEPCID) 40 MG tablet Take 40 mg by mouth daily (Patient not taking: Reported on 8/17/2023)       No current facility-administered medications for this visit. Allergies  Allergies   Allergen Reactions   • Ciprofloxacin GI Intolerance   • Cephalexin    • Oxybutynin GI Intolerance   • Tiotropium Bromide Monohydrate GI Intolerance         The following portions of the patient's history were reviewed and updated as appropriate: allergies, current medications, past medical history, past social history, past surgical history and problem list.      Vitals  Vitals:    08/17/23 1029   BP: 120/76   Pulse: 75   SpO2: 98%   Weight: 107 kg (236 lb)   Height: 5' 10" (1.778 m)     Physical Exam  Physical Exam  Vitals reviewed.    Constitutional: General: He is not in acute distress. Appearance: Normal appearance. He is normal weight. HENT:      Head: Normocephalic. Cardiovascular:      Rate and Rhythm: Normal rate. Pulmonary:      Effort: No respiratory distress. Breath sounds: Normal breath sounds. Skin:     General: Skin is warm and dry. Neurological:      General: No focal deficit present. Mental Status: He is alert and oriented to person, place, and time. Psychiatric:         Mood and Affect: Mood normal.         Behavior: Behavior normal.       Results  No results found for this or any previous visit (from the past 1 hour(s)).]  Lab Results   Component Value Date    PSA 1.78 08/08/2023    PSA 1.0 05/31/2022    PSA 0.8 05/25/2021     Lab Results   Component Value Date    GLUCOSE 100 06/18/2014    CALCIUM 9.2 04/11/2023     06/18/2014    K 3.6 04/11/2023    CO2 32 04/11/2023     04/11/2023    BUN 26 (H) 04/11/2023    CREATININE 0.97 04/11/2023     Lab Results   Component Value Date    WBC 6.72 04/11/2023    HGB 14.7 04/11/2023    HCT 45.6 04/11/2023    MCV 97 04/11/2023     04/11/2023     Orders  Orders Placed This Encounter   Procedures   • PSA, Total Screen     This is a patient instruction: This test is non-fasting. Please drink two glasses of water morning of bloodwork.         Standing Status:   Future     Standing Expiration Date:   2/17/2025       MAGDALENA Rich

## 2023-09-05 DIAGNOSIS — K21.9 GASTROESOPHAGEAL REFLUX DISEASE WITHOUT ESOPHAGITIS: ICD-10-CM

## 2023-09-06 RX ORDER — PANTOPRAZOLE SODIUM 40 MG/1
TABLET, DELAYED RELEASE ORAL
Qty: 90 TABLET | Refills: 0 | Status: SHIPPED | OUTPATIENT
Start: 2023-09-06

## 2023-09-13 DIAGNOSIS — E78.5 HYPERLIPIDEMIA, UNSPECIFIED HYPERLIPIDEMIA TYPE: ICD-10-CM

## 2023-09-14 RX ORDER — ATORVASTATIN CALCIUM 40 MG/1
40 TABLET, FILM COATED ORAL DAILY
Qty: 90 TABLET | Refills: 0 | Status: SHIPPED | OUTPATIENT
Start: 2023-09-14

## 2023-09-14 RX ORDER — LISINOPRIL 40 MG/1
TABLET ORAL
Qty: 90 TABLET | Refills: 0 | Status: SHIPPED | OUTPATIENT
Start: 2023-09-14

## 2023-09-23 DIAGNOSIS — E87.6 HYPOKALEMIA: ICD-10-CM

## 2023-09-25 RX ORDER — POTASSIUM CHLORIDE 600 MG/1
8 TABLET, FILM COATED, EXTENDED RELEASE ORAL EVERY MORNING
Qty: 90 TABLET | Refills: 0 | Status: SHIPPED | OUTPATIENT
Start: 2023-09-25

## 2023-09-28 ENCOUNTER — APPOINTMENT (OUTPATIENT)
Dept: LAB | Age: 73
End: 2023-09-28
Payer: MEDICARE

## 2023-09-28 DIAGNOSIS — Z12.5 SPECIAL SCREENING FOR MALIGNANT NEOPLASM OF PROSTATE: ICD-10-CM

## 2023-09-28 DIAGNOSIS — E78.2 MIXED HYPERLIPIDEMIA: ICD-10-CM

## 2023-09-28 DIAGNOSIS — Z98.1 S/P CERVICAL SPINAL FUSION: ICD-10-CM

## 2023-09-28 DIAGNOSIS — I25.10 CORONARY ARTERY DISEASE INVOLVING NATIVE CORONARY ARTERY OF NATIVE HEART WITHOUT ANGINA PECTORIS: ICD-10-CM

## 2023-09-28 LAB
ALBUMIN SERPL BCP-MCNC: 4.4 G/DL (ref 3.5–5)
ALP SERPL-CCNC: 140 U/L (ref 34–104)
ALT SERPL W P-5'-P-CCNC: 12 U/L (ref 7–52)
ANION GAP SERPL CALCULATED.3IONS-SCNC: 21 MMOL/L
AST SERPL W P-5'-P-CCNC: 24 U/L (ref 13–39)
BILIRUB SERPL-MCNC: 1.16 MG/DL (ref 0.2–1)
BUN SERPL-MCNC: 33 MG/DL (ref 5–25)
CALCIUM SERPL-MCNC: 9.6 MG/DL (ref 8.4–10.2)
CHLORIDE SERPL-SCNC: 108 MMOL/L (ref 96–108)
CHOLEST SERPL-MCNC: 137 MG/DL
CO2 SERPL-SCNC: 20 MMOL/L (ref 21–32)
CREAT SERPL-MCNC: 1 MG/DL (ref 0.6–1.3)
GFR SERPL CREATININE-BSD FRML MDRD: 74 ML/MIN/1.73SQ M
GLUCOSE P FAST SERPL-MCNC: 95 MG/DL (ref 65–99)
HDLC SERPL-MCNC: 46 MG/DL
LDLC SERPL CALC-MCNC: 75 MG/DL (ref 0–100)
LDLC SERPL DIRECT ASSAY-MCNC: 76 MG/DL (ref 0–100)
NONHDLC SERPL-MCNC: 91 MG/DL
POTASSIUM SERPL-SCNC: 4.4 MMOL/L (ref 3.5–5.3)
PROT SERPL-MCNC: 7.8 G/DL (ref 6.4–8.4)
PSA SERPL-MCNC: 0.85 NG/ML (ref 0–4)
SODIUM SERPL-SCNC: 149 MMOL/L (ref 135–147)
TRIGL SERPL-MCNC: 78 MG/DL

## 2023-09-28 PROCEDURE — 80061 LIPID PANEL: CPT

## 2023-09-28 PROCEDURE — 80053 COMPREHEN METABOLIC PANEL: CPT

## 2023-09-28 PROCEDURE — G0103 PSA SCREENING: HCPCS

## 2023-09-28 PROCEDURE — 36415 COLL VENOUS BLD VENIPUNCTURE: CPT

## 2023-09-28 PROCEDURE — 83721 ASSAY OF BLOOD LIPOPROTEIN: CPT

## 2023-10-10 NOTE — RESULT ENCOUNTER NOTE
Please call patient. Labs show hypernatremia, labs are otherwise stable. Pt should call back if he develops excess fatigue, confusion or symptoms. Will discuss more fully at next visit.

## 2023-10-12 ENCOUNTER — RA CDI HCC (OUTPATIENT)
Dept: OTHER | Facility: HOSPITAL | Age: 73
End: 2023-10-12

## 2023-10-19 ENCOUNTER — OFFICE VISIT (OUTPATIENT)
Dept: FAMILY MEDICINE CLINIC | Facility: CLINIC | Age: 73
End: 2023-10-19
Payer: MEDICARE

## 2023-10-19 VITALS
HEIGHT: 70 IN | WEIGHT: 232 LBS | BODY MASS INDEX: 33.21 KG/M2 | HEART RATE: 63 BPM | SYSTOLIC BLOOD PRESSURE: 120 MMHG | OXYGEN SATURATION: 98 % | DIASTOLIC BLOOD PRESSURE: 80 MMHG | TEMPERATURE: 97.7 F | RESPIRATION RATE: 16 BRPM

## 2023-10-19 DIAGNOSIS — M54.17 LUMBOSACRAL RADICULITIS: ICD-10-CM

## 2023-10-19 DIAGNOSIS — E66.9 OBESITY, UNSPECIFIED CLASSIFICATION, UNSPECIFIED OBESITY TYPE, UNSPECIFIED WHETHER SERIOUS COMORBIDITY PRESENT: ICD-10-CM

## 2023-10-19 DIAGNOSIS — Z00.00 MEDICARE ANNUAL WELLNESS VISIT, SUBSEQUENT: Primary | ICD-10-CM

## 2023-10-19 PROCEDURE — G0439 PPPS, SUBSEQ VISIT: HCPCS | Performed by: FAMILY MEDICINE

## 2023-10-19 PROCEDURE — 99213 OFFICE O/P EST LOW 20 MIN: CPT | Performed by: FAMILY MEDICINE

## 2023-10-19 RX ORDER — TIZANIDINE 4 MG/1
4 TABLET ORAL EVERY 8 HOURS PRN
Qty: 30 TABLET | Refills: 1 | Status: SHIPPED | OUTPATIENT
Start: 2023-10-19

## 2023-10-19 NOTE — PATIENT INSTRUCTIONS
Medicare Preventive Visit Patient Instructions  Thank you for completing your Welcome to Medicare Visit or Medicare Annual Wellness Visit today. Your next wellness visit will be due in one year (10/19/2024). The screening/preventive services that you may require over the next 5-10 years are detailed below. Some tests may not apply to you based off risk factors and/or age. Screening tests ordered at today's visit but not completed yet may show as past due. Also, please note that scanned in results may not display below. Preventive Screenings:  Service Recommendations Previous Testing/Comments   Colorectal Cancer Screening  Colonoscopy    Fecal Occult Blood Test (FOBT)/Fecal Immunochemical Test (FIT)  Fecal DNA/Cologuard Test  Flexible Sigmoidoscopy Age: 43-73 years old   Colonoscopy: every 10 years (May be performed more frequently if at higher risk)  OR  FOBT/FIT: every 1 year  OR  Cologuard: every 3 years  OR  Sigmoidoscopy: every 5 years  Screening may be recommended earlier than age 39 if at higher risk for colorectal cancer. Also, an individualized decision between you and your healthcare provider will decide whether screening between the ages of 77-80 would be appropriate.  Colonoscopy: 08/30/2022  FOBT/FIT: Not on file  Cologuard: 08/30/2022  Sigmoidoscopy: Not on file    Screening Current     Prostate Cancer Screening Individualized decision between patient and health care provider in men between ages of 53-66   Medicare will cover every 12 months beginning on the day after your 50th birthday PSA: 0.85 ng/mL     Screening Current     Hepatitis C Screening Once for adults born between 1945 and 1965  More frequently in patients at high risk for Hepatitis C Hep C Antibody: 03/10/2020    Screening Current   Diabetes Screening 1-2 times per year if you're at risk for diabetes or have pre-diabetes Fasting glucose: 95 mg/dL (9/28/2023)  A1C: 5.1 % (4/11/2023)  Screening Current   Cholesterol Screening Once every 5 years if you don't have a lipid disorder. May order more often based on risk factors. Lipid panel: 09/28/2023  Screening Not Indicated  History Lipid Disorder      Other Preventive Screenings Covered by Medicare:  Abdominal Aortic Aneurysm (AAA) Screening: covered once if your at risk. You're considered to be at risk if you have a family history of AAA or a male between the age of 70-76 who smoking at least 100 cigarettes in your lifetime. Lung Cancer Screening: covers low dose CT scan once per year if you meet all of the following conditions: (1) Age 48-67; (2) No signs or symptoms of lung cancer; (3) Current smoker or have quit smoking within the last 15 years; (4) You have a tobacco smoking history of at least 20 pack years (packs per day x number of years you smoked); (5) You get a written order from a healthcare provider. Glaucoma Screening: covered annually if you're considered high risk: (1) You have diabetes OR (2) Family history of glaucoma OR (3)  aged 48 and older OR (3)  American aged 72 and older  Osteoporosis Screening: covered every 2 years if you meet one of the following conditions: (1) Have a vertebral abnormality; (2) On glucocorticoid therapy for more than 3 months; (3) Have primary hyperparathyroidism; (4) On osteoporosis medications and need to assess response to drug therapy. HIV Screening: covered annually if you're between the age of 14-79. Also covered annually if you are younger than 13 and older than 72 with risk factors for HIV infection. For pregnant patients, it is covered up to 3 times per pregnancy.     Immunizations:  Immunization Recommendations   Influenza Vaccine Annual influenza vaccination during flu season is recommended for all persons aged >= 6 months who do not have contraindications   Pneumococcal Vaccine   * Pneumococcal conjugate vaccine = PCV13 (Prevnar 13), PCV15 (Vaxneuvance), PCV20 (Prevnar 20)  * Pneumococcal polysaccharide vaccine = PPSV23 (Pneumovax) Adults 85-02 yo with certain risk factors or if 69+ yo  If never received any pneumonia vaccine: recommend Prevnar 20 (PCV20)  Give PCV20 if previously received 1 dose of PCV13 or PPSV23   Hepatitis B Vaccine 3 dose series if at intermediate or high risk (ex: diabetes, end stage renal disease, liver disease)   Respiratory syncytial virus (RSV) Vaccine - COVERED BY MEDICARE PART D  * RSVPreF3 (Arexvy) CDC recommends that adults 61years of age and older may receive a single dose of RSV vaccine using shared clinical decision-making (SCDM)   Tetanus (Td) Vaccine - COST NOT COVERED BY MEDICARE PART B Following completion of primary series, a booster dose should be given every 10 years to maintain immunity against tetanus. Td may also be given as tetanus wound prophylaxis. Tdap Vaccine - COST NOT COVERED BY MEDICARE PART B Recommended at least once for all adults. For pregnant patients, recommended with each pregnancy. Shingles Vaccine (Shingrix) - COST NOT COVERED BY MEDICARE PART B  2 shot series recommended in those 19 years and older who have or will have weakened immune systems or those 50 years and older     Health Maintenance Due:      Topic Date Due   • Colorectal Cancer Screening  08/30/2025   • Hepatitis C Screening  Completed     Immunizations Due:  There are no preventive care reminders to display for this patient. Advance Directives   What are advance directives? Advance directives are legal documents that state your wishes and plans for medical care. These plans are made ahead of time in case you lose your ability to make decisions for yourself. Advance directives can apply to any medical decision, such as the treatments you want, and if you want to donate organs. What are the types of advance directives? There are many types of advance directives, and each state has rules about how to use them. You may choose a combination of any of the following:  Living will:   This is a written record of the treatment you want. You can also choose which treatments you do not want, which to limit, and which to stop at a certain time. This includes surgery, medicine, IV fluid, and tube feedings. Durable power of  for [facility]): This is a written record that states who you want to make healthcare choices for you when you are unable to make them for yourself. This person, called a proxy, is usually a family member or a friend. You may choose more than 1 proxy. Do not resuscitate (DNR) order:  A DNR order is used in case your heart stops beating or you stop breathing. It is a request not to have certain forms of treatment, such as CPR. A DNR order may be included in other types of advance directives. Medical directive: This covers the care that you want if you are in a coma, near death, or unable to make decisions for yourself. You can list the treatments you want for each condition. Treatment may include pain medicine, surgery, blood transfusions, dialysis, IV or tube feedings, and a ventilator (breathing machine). Values history: This document has questions about your views, beliefs, and how you feel and think about life. This information can help others choose the care that you would choose. Why are advance directives important? An advance directive helps you control your care. Although spoken wishes may be used, it is better to have your wishes written down. Spoken wishes can be misunderstood, or not followed. Treatments may be given even if you do not want them. An advance directive may make it easier for your family to make difficult choices about your care. Weight Management   Why it is important to manage your weight:  Being overweight increases your risk of health conditions such as heart disease, high blood pressure, type 2 diabetes, and certain types of cancer. It can also increase your risk for osteoarthritis, sleep apnea, and other respiratory problems.  Aim for a slow, steady weight loss. Even a small amount of weight loss can lower your risk of health problems. How to lose weight safely:  A safe and healthy way to lose weight is to eat fewer calories and get regular exercise. You can lose up about 1 pound a week by decreasing the number of calories you eat by 500 calories each day. Healthy meal plan for weight management:  A healthy meal plan includes a variety of foods, contains fewer calories, and helps you stay healthy. A healthy meal plan includes the following:  Eat whole-grain foods more often. A healthy meal plan should contain fiber. Fiber is the part of grains, fruits, and vegetables that is not broken down by your body. Whole-grain foods are healthy and provide extra fiber in your diet. Some examples of whole-grain foods are whole-wheat breads and pastas, oatmeal, brown rice, and bulgur. Eat a variety of vegetables every day. Include dark, leafy greens such as spinach, kale, dov greens, and mustard greens. Eat yellow and orange vegetables such as carrots, sweet potatoes, and winter squash. Eat a variety of fruits every day. Choose fresh or canned fruit (canned in its own juice or light syrup) instead of juice. Fruit juice has very little or no fiber. Eat low-fat dairy foods. Drink fat-free (skim) milk or 1% milk. Eat fat-free yogurt and low-fat cottage cheese. Try low-fat cheeses such as mozzarella and other reduced-fat cheeses. Choose meat and other protein foods that are low in fat. Choose beans or other legumes such as split peas or lentils. Choose fish, skinless poultry (chicken or turkey), or lean cuts of red meat (beef or pork). Before you cook meat or poultry, cut off any visible fat. Use less fat and oil. Try baking foods instead of frying them. Add less fat, such as margarine, sour cream, regular salad dressing and mayonnaise to foods. Eat fewer high-fat foods.  Some examples of high-fat foods include french fries, doughnuts, ice cream, and cakes. Eat fewer sweets. Limit foods and drinks that are high in sugar. This includes candy, cookies, regular soda, and sweetened drinks. Exercise:  Exercise at least 30 minutes per day on most days of the week. Some examples of exercise include walking, biking, dancing, and swimming. You can also fit in more physical activity by taking the stairs instead of the elevator or parking farther away from stores. Ask your healthcare provider about the best exercise plan for you. Narcotic (Opioid) Safety    Use narcotics safely:  Take prescribed narcotics exactly as directed  Do not give narcotics to others or take narcotics that belong to someone else  Do not mix narcotics without medicines or alcohol  Do not drive or operate heavy machinery after you take the narcotic  Monitor for side effects and notify your healthcare provider if you experienced side effects such as nausea, sleepiness, itching, or trouble thinking clearly. Manage constipation:    Constipation is the most common side effect of narcotic medicine. Constipation is when you have hard, dry bowel movements, or you go longer than usual between bowel movements. Tell your healthcare provider about all changes in your bowel movements while you are taking narcotics. He or she may recommend laxative medicine to help you have a bowel movement. He or she may also change the kind of narcotic you are taking, or change when you take it. The following are more ways you can prevent or relieve constipation:    Drink liquids as directed. You may need to drink extra liquids to help soften and move your bowels. Ask how much liquid to drink each day and which liquids are best for you. Eat high-fiber foods. This may help decrease constipation by adding bulk to your bowel movements. High-fiber foods include fruits, vegetables, whole-grain breads and cereals, and beans. Your healthcare provider or dietitian can help you create a high-fiber meal plan.  Your provider may also recommend a fiber supplement if you cannot get enough fiber from food. Exercise regularly. Regular physical activity can help stimulate your intestines. Walking is a good exercise to prevent or relieve constipation. Ask which exercises are best for you. Schedule a time each day to have a bowel movement. This may help train your body to have regular bowel movements. Bend forward while you are on the toilet to help move the bowel movement out. Sit on the toilet for at least 10 minutes, even if you do not have a bowel movement. Store narcotics safely:   Store narcotics where others cannot easily get them. Keep them in a locked cabinet or secure area. Do not  keep them in a purse or other bag you carry with you. A person may be looking for something else and find the narcotics. Make sure narcotics are stored out of the reach of children. A child can easily overdose on narcotics. Narcotics may look like candy to a small child. The best way to dispose of narcotics: The laws vary by country and area. In the [facility], the best way is to return the narcotics through a take-back program. This program is offered by the [employer] ([employer]). The following are options for using the program:  Take the narcotics to a [employer] collection site. The site is often a law enforcement center. Call your local law enforcement center for scheduled take-back days in your area. You will be given information on where to go if the collection site is in a different location. Take the narcotics to an approved pharmacy or hospital.  A pharmacy or hospital may be set up as a collection site. You will need to ask if it is a [employer] collection site if you were not directed there. A pharmacy or doctor's office may not be able to take back narcotics unless it is a [employer] site. Use a mail-back system. This means you are given containers to put the narcotics into. You will then mail them in the containers.   Use a take-back drop box. This is a place to leave the narcotics at any time. People and animals will not be able to get into the box. Your local law enforcement agency can tell you where to find a drop box in your area. Other ways to manage pain:   Ask your healthcare provider about non-narcotic medicines to control pain. Nonprescription medicines include NSAIDs (such as ibuprofen) and acetaminophen. Prescription medicines include muscle relaxers, antidepressants, and steroids. Pain may be managed without any medicines. Some ways to relieve pain include massage, aromatherapy, or meditation. Physical or occupational therapy may also help. For more information:   Drug Enforcement Administration  [address]  Phone: 1- 705 - [phone]  Web Address: [facility] Compliance.. [employer]gov/drug_disposal/    [address] [place] [address]  Phone: 5- 491 - 452-2165  Web Address: http://[employer]/     © Copyright [employer] 2018 Information is for End User's use only and may not be sold, redistributed or otherwise used for commercial purposes.  All illustrations and images included in CareNotes® are the copyrighted property of [employer] or [address] [place] [address]

## 2023-10-19 NOTE — PROGRESS NOTES
Assessment and Plan:     Problem List Items Addressed This Visit          Nervous and Auditory    Lumbosacral radiculitis    Relevant Medications    tiZANidine (ZANAFLEX) 4 mg tablet       Other    Morbid (severe) obesity due to excess calories ([address])     Other Visit Diagnoses       Medicare annual wellness visit, subsequent    -  Primary          67 y/o male with: lumbar radiculitis, obesity and Medicare AWV. Discussed supportive care and return parameters. Will give tizanidine PRN, pt declines PT at this time advised him to call back if not improving or worsening. Regarding Medicare Annual well visit. Discussed various safety and health maintenance issues including healthy diet like the Mediterranean diet, exercise, ample sleep, stress reduction, and healthy weight as tolerated. Discussed supportive care and return parameters. Preventive health issues were discussed with patient, and age appropriate screening tests were ordered as noted in patient's After Visit Summary. Personalized health advice and appropriate referrals for health education or preventive services given if needed, as noted in patient's After Visit Summary. History of Present Illness:     Patient presents for a Medicare Wellness Visit    Patient is a 67 y/o male who presents for follow-up on back pain and obesity no fevers chills nausea or vomiting. Patient also here for medicare AWV admits being active eats and sleeps well. Patient Care Team:  Shantel Osorio MD as PCP - General (Family Medicine)  MD Jenny Coppola MD Billye Newer, MD     Review of Systems:     Review of Systems   Constitutional: Negative. HENT: Negative. Eyes: Negative. Respiratory: Negative. Cardiovascular: Negative. Gastrointestinal: Negative. Endocrine: Negative. Genitourinary: Negative. Musculoskeletal: Negative. Allergic/Immunologic: Negative. Neurological: Negative. Hematological: Negative. Psychiatric/Behavioral: Negative. All other systems reviewed and are negative.        Problem List:     Patient Active Problem List   Diagnosis    Arthropathy of knee    Brachial radiculitis    Cervical radicular pain    Cervical radiculopathy    Cervical spondylosis with myelopathy    Chronic pain disorder    Benign essential hypertension    Gait disturbance    Hyperlipidemia    Lumbar stenosis    Lumbosacral radiculitis    Peripheral neuropathy    S/P cervical spinal fusion    Urinary urgency    Vitamin B deficiency    Vitamin D deficiency    Acute idiopathic gout of left wrist    Coronary artery disease involving native coronary artery of native heart without angina pectoris    Morbid (severe) obesity due to excess calories (HCC)    Pre-diabetes    BPH with obstruction/lower urinary tract symptoms    Hyperglycemia    Incomplete tear of rotator cuff    Gait disorder    Cervical stenosis of spinal canal    Hereditary and idiopathic peripheral neuropathy    Continuous opioid dependence (HCC)    Gastroesophageal reflux disease without esophagitis    Family history of colon cancer      Past Medical and Surgical History:     Past Medical History:   Diagnosis Date    Arthritis     Coronary artery disease     Hypertension     Obesity      Past Surgical History:   Procedure Laterality Date    CERVICAL DISCECTOMY      KNEE SURGERY      SPINE SURGERY        Family History:     Family History   Problem Relation Age of Onset    Cancer Father     Cancer Mother     Dementia Mother       Social History:     Social History     Socioeconomic History    Marital status:      Spouse name: None    Number of children: None    Years of education: None    Highest education level: None   Occupational History    Occupation: retired   Tobacco Use    Smoking status: Former    Smokeless tobacco: Former    Tobacco comments:     quit 10 years ago pipe, NEVER A SMOKER AS PER [employer]   Vaping Use    Vaping Use: Never used Substance and Sexual Activity    Alcohol use: No    Drug use: No    Sexual activity: Never     Partners: Female     Birth control/protection: Abstinence   Other Topics Concern    None   Social History Narrative    Pt states he has about 2-3 cups of cold Ice Tea daily     Social Determinants of Health     Financial Resource Strain: Medium Risk (10/19/2023)    Overall Financial Resource Strain (CARDIA)     Difficulty of Paying Living Expenses: Somewhat hard   Food Insecurity: No Food Insecurity (8/11/2022)    Hunger Vital Sign     Worried About Running Out of Food in the Last Year: Never true     Ran Out of Food in the Last Year: Never true   Transportation Needs: No Transportation Needs (10/19/2023)    PRAPARE - Transportation     Lack of Transportation (Medical): No     Lack of Transportation (Non-Medical):  No   Physical Activity: Inactive (8/11/2022)    Exercise Vital Sign     Days of Exercise per Week: 0 days     Minutes of Exercise per Session: 0 min   Stress: No Stress Concern Present (8/11/2022)    [address] [facility]     Feeling of Stress : Not at all   Social Connections: Socially Isolated (8/11/2022)    Social Connection and Isolation Panel [NHANES]     Frequency of Communication with Friends and Family: More than three times a week     Frequency of Social Gatherings with Friends and Family: More than three times a week     Attends Gnosticism Services: Never     Active Member of Clubs or Organizations: No     Attends Club or Organization Meetings: Never     Marital Status:    Intimate Partner Violence: Not At Risk (8/11/2022)    Humiliation, Afraid, Rape, and Kick questionnaire     Fear of Current or Ex-Partner: No     Emotionally Abused: No     Physically Abused: No     Sexually Abused: No   Housing Stability: Low Risk  (8/11/2022)    Housing Stability Vital Sign     Unable to Pay for Housing in the Last Year: No     Number of Places Lived in the Last Year: 1     Unstable Housing in the Last Year: No      Medications and Allergies:     Current Outpatient Medications   Medication Sig Dispense Refill    Acetaminophen (TYLENOL ARTHRITIS PAIN PO) Take by mouth daily      aspirin (ECOTRIN LOW STRENGTH) 81 mg EC tablet Take 81 mg by mouth daily      atorvastatin (LIPITOR) 40 mg tablet TAKE ONE TABLET BY MOUTH ONCE DAILY 90 tablet 0    Cholecalciferol (VITAMIN D-3) 5000 units TABS take 1 tablet daily      cyanocobalamin (VITAMIN B-12) 1000 MCG tablet Take 1,000 mcg by mouth daily      gabapentin (NEURONTIN) 100 mg capsule Take 1 capsule (100 mg total) by mouth 4 (four) times a day 360 capsule 3    lisinopril (ZESTRIL) 40 mg tablet TAKE ONE TABLET BY MOUTH ONCE DAILY 90 tablet 0    nystatin-triamcinolone (MYCOLOG-II) ointment Apply topically 2 (two) times a day 30 g 2    oxyCODONE-acetaminophen (PERCOCET) 5-325 mg per tablet Take 1 tablet by mouth every 8 (eight) hours as needed for moderate pain Max Daily Amount: 3 tablets 60 tablet 0    pantoprazole (PROTONIX) 40 mg tablet TAKE ONE TABLET BY MOUTH EVERY DAY IN THE MORNING BEFORE BREAKFAST 90 tablet 0    potassium chloride (KLOR-CON) 8 MEQ tablet TAKE 1 TABLET BY MOUTH EVERY DAY IN THE MORNING. 90 tablet 0    tiZANidine (ZANAFLEX) 4 mg tablet Take 1 tablet (4 mg total) by mouth every 8 (eight) hours as needed for muscle spasms 30 tablet 1    amoxicillin (AMOXIL) 500 mg capsule Only dental work (Patient not taking: Reported on 8/17/2023)      famotidine (PEPCID) 40 MG tablet Take 40 mg by mouth daily (Patient not taking: Reported on 8/17/2023)       No current facility-administered medications for this visit.      Allergies   Allergen Reactions    Ciprofloxacin GI Intolerance    Cephalexin     Oxybutynin GI Intolerance    Tiotropium Bromide Monohydrate GI Intolerance      Immunizations:     Immunization History   Administered Date(s) Administered    COVID-19 PFIZER VACCINE 0.3 ML IM 03/10/2021, 04/07/2021, 10/08/2021, 04/08/2022, 09/23/2022    INFLUENZA 11/07/2007, 10/17/2012, 10/15/2015, 08/27/2020, 09/14/2021, 09/30/2022    Influenza Split High Dose Preservative Free IM 10/15/2015    Influenza Whole 10/06/2019    Influenza, high dose seasonal 0.7 mL 10/04/2018, 10/04/2018, 09/30/2022    Pneumococcal 03/27/2019    Pneumococcal Conjugate 13-Valent 10/15/2015    Pneumococcal Conjugate PCV 7 03/27/2019    Pneumococcal Conjugate Vaccine 20-valent (Pcv20), Polysace 08/11/2022    Tdap 01/24/2023    Zoster Vaccine Recombinant 09/08/2022, 01/04/2023      Health Maintenance:         Topic Date Due    Colorectal Cancer Screening  08/30/2025    Hepatitis C Screening  Completed     There are no preventive care reminders to display for this patient. Medicare Screening Tests and Risk Assessments:     Peter Jay is here for his Subsequent Wellness visit. Last Medicare Wellness visit information reviewed, patient interviewed and updates made to the record today. Health Risk Assessment:   Patient rates overall health as fair. Patient feels that their physical health rating is same. Patient is satisfied with their life. Eyesight was rated as same. Hearing was rated as same. Patient feels that their emotional and mental health rating is same. Patients states they are sometimes angry. Patient states they are sometimes unusually tired/fatigued. Pain experienced in the last 7 days has been some. Patient's pain rating has been 6/10. Patient states that he has experienced no weight loss or gain in last 6 months. Fall Risk Screening: In the past year, patient has experienced: no history of falling in past year      Home Safety:  Patient has trouble with stairs inside or outside of their home. Patient has working smoke alarms and has working carbon monoxide detector. Home safety hazards include: none. Nutrition:   Current diet is Regular. Medications:   Patient is currently taking over-the-counter supplements.  OTC medications include: see medication list. Patient is able to manage medications. Activities of Daily Living (ADLs)/Instrumental Activities of Daily Living (IADLs):   Walk and transfer into and out of bed and chair?: Yes  Dress and groom yourself?: Yes    Bathe or shower yourself?: Yes    Feed yourself? Yes  Do your laundry/housekeeping?: Yes  Manage your money, pay your bills and track your expenses?: Yes  Make your own meals?: Yes    Do your own shopping?: Yes    Previous Hospitalizations:   Any hospitalizations or ED visits within the last 12 months?: No      Advance Care Planning:   Living will: Yes    Advanced directive: Yes      Cognitive Screening:   Provider or family/friend/caregiver concerned regarding cognition?: No    PREVENTIVE SCREENINGS      Cardiovascular Screening:    General: Screening Not Indicated and History Lipid Disorder      Diabetes Screening:     General: Screening Current      Colorectal Cancer Screening:     General: Screening Current      Prostate Cancer Screening:    General: Screening Current      Osteoporosis Screening:    General: Patient Declines      Abdominal Aortic Aneurysm (AAA) Screening:    Risk factors include: age between 70-75 yo and tobacco use        General: Patient Declines      Lung Cancer Screening:     General: Screening Not Indicated      Hepatitis C Screening:    General: Screening Current    Screening, Brief Intervention, and Referral to Treatment (SBIRT)    Screening  Typical number of drinks in a day: 0  Typical number of drinks in a week: 0  Interpretation: Low risk drinking behavior.     Single Item Drug Screening:  How often have you used an illegal drug (including marijuana) or a prescription medication for non-medical reasons in the past year? never    Single Item Drug Screen Score: 0  Interpretation: Negative screen for possible drug use disorder    Review of Current Opioid Use  Opioid Risk Tool (ORT) Score: 0  Opioid Risk Tool (ORT) Interpretation: Score 0-3: Low risk for opioid misuse    No results found. Physical Exam:     /80 (BP Location: Left arm, Patient Position: Sitting, Cuff Size: Standard)   Pulse 63   Temp 97.7 °F (36.5 °C) (Tympanic)   Resp 16   Ht 5' 10" (1.778 m)   Wt 105 kg (232 lb)   SpO2 98%   BMI 33.29 kg/m²     Physical Exam  Vitals reviewed. Constitutional:       General: He is not in acute distress. Appearance: He is well-developed. He is not diaphoretic. HENT:      Head: Normocephalic and atraumatic. Right Ear: External ear normal.      Left Ear: External ear normal.      Nose: Nose normal.   Eyes:      General: No scleral icterus. Right eye: No discharge. Left eye: No discharge. Conjunctiva/sclera: Conjunctivae normal.      Pupils: Pupils are equal, round, and reactive to light. Neck:      Thyroid: No thyromegaly. Trachea: No tracheal deviation. Cardiovascular:      Rate and Rhythm: Normal rate and regular rhythm. Heart sounds: Normal heart sounds. No murmur heard. No friction rub. Pulmonary:      Effort: Pulmonary effort is normal. No respiratory distress. Breath sounds: Normal breath sounds. No stridor. No wheezing or rales. Abdominal:      General: There is no distension. Palpations: Abdomen is soft. There is no mass. Tenderness: There is no abdominal tenderness. There is no guarding or rebound. Musculoskeletal:         General: Normal range of motion. Cervical back: Normal range of motion and neck supple. Lymphadenopathy:      Cervical: No cervical adenopathy. Skin:     General: Skin is warm. Neurological:      Mental Status: He is alert and oriented to person, place, and time. Cranial Nerves: No cranial nerve deficit. Psychiatric:         Behavior: Behavior normal.         Thought Content:  Thought content normal.         Judgment: Judgment normal.          Alli Traore MD

## 2023-12-03 DIAGNOSIS — K21.9 GASTROESOPHAGEAL REFLUX DISEASE WITHOUT ESOPHAGITIS: ICD-10-CM

## 2023-12-04 RX ORDER — PANTOPRAZOLE SODIUM 40 MG/1
TABLET, DELAYED RELEASE ORAL
Qty: 90 TABLET | Refills: 0 | Status: SHIPPED | OUTPATIENT
Start: 2023-12-04

## 2023-12-30 ENCOUNTER — NURSE TRIAGE (OUTPATIENT)
Dept: OTHER | Facility: OTHER | Age: 73
End: 2023-12-30

## 2023-12-30 DIAGNOSIS — U07.1 COVID-19: Primary | ICD-10-CM

## 2023-12-30 RX ORDER — NIRMATRELVIR AND RITONAVIR 300-100 MG
3 KIT ORAL 2 TIMES DAILY
Qty: 30 TABLET | Refills: 0 | Status: SHIPPED | OUTPATIENT
Start: 2023-12-30 | End: 2024-01-04

## 2023-12-30 NOTE — TELEPHONE ENCOUNTER
Regarding: covid with cough, sore throat  ----- Message from Nakia Taylor sent at 12/30/2023  9:50 AM EST -----  I tested for covid this morning and my symptoms are cough, sore throat, slight headache, tiredness. Wants paxlovid

## 2023-12-30 NOTE — TELEPHONE ENCOUNTER
"Reason for Disposition   [1] HIGH RISK patient AND [2] influenza is widespread in the community AND [3] ONE OR MORE respiratory symptoms: cough, sore throat, runny or stuffy nose    Answer Assessment - Initial Assessment Questions  1. COVID-19 DIAGNOSIS: \"Who made your COVID-19 diagnosis?\" \"Was it confirmed by a positive lab test or self-test?\" If not diagnosed by a doctor (or NP/PA), ask \"Are there lots of cases (community spread) where you live?\" Note: See public health department website, if unsure.      Home test - this morning    2. COVID-19 EXPOSURE: \"Was there any known exposure to COVID before the symptoms began?\" CDC Definition of close contact: within 6 feet (2 meters) for a total of 15 minutes or more over a 24-hour period.       Possibly - family gathering at [place]    3. ONSET: \"When did the COVID-19 symptoms start?\"       Tuesday night/Wednesday morning    4. WORST SYMPTOM: \"What is your worst symptom?\" (e.g., cough, fever, shortness of breath, muscle aches)      Coughing    5. COUGH: \"Do you have a cough?\" If Yes, ask: \"How bad is the cough?\"        Dry    6. FEVER: \"Do you have a fever?\" If Yes, ask: \"What is your temperature, how was it measured, and when did it start?\"      Temp right now - 98.8     7. RESPIRATORY STATUS: \"Describe your breathing?\" (e.g., shortness of breath, wheezing, unable to speak)       Denies any issues    8. BETTER-SAME-WORSE: \"Are you getting better, staying the same or getting worse compared to yesterday?\"  If getting worse, ask, \"In what way?\"      About the same    9. HIGH RISK DISEASE: \"Do you have any chronic medical problems?\" (e.g., asthma, heart or lung disease, weak immune system, obesity, etc.)      See chart    10. VACCINE: \"Have you had the COVID-19 vaccine?\" If Yes, ask: \"Which one, how many shots, when did you get it?\"        Yes    11. BOOSTER: \"Have you received your COVID-19 booster?\" If Yes, ask: \"Which one and when did you get it?\"        Yes    12. " "PREGNANCY: \"Is there any chance you are pregnant?\" \"When was your last menstrual period?\"        N/A    13. OTHER SYMPTOMS: \"Do you have any other symptoms?\"  (e.g., chills, fatigue, headache, loss of smell or taste, muscle pain, sore throat)        Fatigue and had a headache last night; reports he normally never gets headaches    Protocols used: Coronavirus (COVID-19) Diagnosed or Suspected-ADULT-        Care advice and recommendations given. Instructed what to monitor for and when to call back. Questions and concerns addressed. Patient verbalized understanding.  "

## 2024-01-07 ENCOUNTER — NURSE TRIAGE (OUTPATIENT)
Dept: OTHER | Facility: OTHER | Age: 74
End: 2024-01-07

## 2024-01-07 NOTE — TELEPHONE ENCOUNTER
"Regarding: Medication Question  ----- Message from Cindy Tom sent at 1/7/2024 11:12 AM EST -----  \" I was told to Stop taking Atorvastatin because I was taking Paxlovid, I want to know when I can start taking it again.\"    "

## 2024-01-07 NOTE — TELEPHONE ENCOUNTER
"Reason for Disposition  • Caller has medicine question only, adult not sick, AND triager answers question    Answer Assessment - Initial Assessment Questions  1. NAME of MEDICATION: \"What medicine are you calling about?\"      atorvastatin  2. QUESTION: \"What is your question?\" (e.g., medication refill, side effect)      When can I start taking again after paxlovid    Protocols used: Medication Question Call-ADULT-    "

## 2024-03-02 DIAGNOSIS — E78.5 HYPERLIPIDEMIA, UNSPECIFIED HYPERLIPIDEMIA TYPE: ICD-10-CM

## 2024-03-02 DIAGNOSIS — K21.9 GASTROESOPHAGEAL REFLUX DISEASE WITHOUT ESOPHAGITIS: ICD-10-CM

## 2024-03-03 RX ORDER — PANTOPRAZOLE SODIUM 40 MG/1
TABLET, DELAYED RELEASE ORAL
Qty: 90 TABLET | Refills: 0 | Status: SHIPPED | OUTPATIENT
Start: 2024-03-03

## 2024-03-03 RX ORDER — ATORVASTATIN CALCIUM 40 MG/1
40 TABLET, FILM COATED ORAL DAILY
Qty: 90 TABLET | Refills: 0 | Status: SHIPPED | OUTPATIENT
Start: 2024-03-03

## 2024-03-03 RX ORDER — LISINOPRIL 40 MG/1
TABLET ORAL
Qty: 90 TABLET | Refills: 0 | Status: SHIPPED | OUTPATIENT
Start: 2024-03-03

## 2024-03-11 DIAGNOSIS — G62.9 NEUROPATHY: ICD-10-CM

## 2024-03-11 RX ORDER — GABAPENTIN 100 MG/1
CAPSULE ORAL
Qty: 360 CAPSULE | Refills: 1 | Status: SHIPPED | OUTPATIENT
Start: 2024-03-11
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
## 2024-03-18 ENCOUNTER — TELEPHONE (OUTPATIENT)
Age: 74
End: 2024-03-18

## 2024-03-18 DIAGNOSIS — Z12.5 SPECIAL SCREENING FOR MALIGNANT NEOPLASM OF PROSTATE: ICD-10-CM

## 2024-03-18 DIAGNOSIS — Z12.5 SCREENING FOR PROSTATE CANCER: Primary | ICD-10-CM

## 2024-03-18 NOTE — TELEPHONE ENCOUNTER
Patient called today to schedule his one year office visit on 8/28 at 10:40 with ROCKY Rojas in [place].    Pt asks that his labs be ordered.    Pt plans to have labs drawn 1 week prior to appointment.    Call back if needed [phone]

## 2024-03-18 NOTE — TELEPHONE ENCOUNTER
Repeat PSA ordered for patient to obtain prior to appointment in August - no other testing needed at this time.

## 2024-04-16 ENCOUNTER — RA CDI HCC (OUTPATIENT)
Dept: OTHER | Facility: HOSPITAL | Age: 74
End: 2024-04-16

## 2024-04-23 ENCOUNTER — TELEPHONE (OUTPATIENT)
Age: 74
End: 2024-04-23

## 2024-04-23 ENCOUNTER — OFFICE VISIT (OUTPATIENT)
Dept: FAMILY MEDICINE CLINIC | Facility: CLINIC | Age: 74
End: 2024-04-23
Payer: MEDICARE

## 2024-04-23 VITALS
WEIGHT: 223.2 LBS | DIASTOLIC BLOOD PRESSURE: 72 MMHG | OXYGEN SATURATION: 98 % | SYSTOLIC BLOOD PRESSURE: 120 MMHG | HEART RATE: 70 BPM | TEMPERATURE: 97.6 F | BODY MASS INDEX: 31.95 KG/M2 | HEIGHT: 70 IN

## 2024-04-23 DIAGNOSIS — M54.12 CERVICAL RADICULAR PAIN: Primary | ICD-10-CM

## 2024-04-23 DIAGNOSIS — Z98.1 S/P CERVICAL SPINAL FUSION: ICD-10-CM

## 2024-04-23 DIAGNOSIS — G62.9 NEUROPATHY: ICD-10-CM

## 2024-04-23 DIAGNOSIS — F11.20 CONTINUOUS OPIOID DEPENDENCE (HCC): ICD-10-CM

## 2024-04-23 PROCEDURE — 99214 OFFICE O/P EST MOD 30 MIN: CPT | Performed by: FAMILY MEDICINE

## 2024-04-23 PROCEDURE — G2211 COMPLEX E/M VISIT ADD ON: HCPCS | Performed by: FAMILY MEDICINE

## 2024-04-23 RX ORDER — OXYCODONE HYDROCHLORIDE AND ACETAMINOPHEN 5; 325 MG/1; MG/1
1 TABLET ORAL EVERY 8 HOURS PRN
Qty: 60 TABLET | Refills: 0 | Status: SHIPPED | OUTPATIENT
Start: 2024-04-23

## 2024-04-23 NOTE — TELEPHONE ENCOUNTER
Pt was seen today for follow up and stated he did not receive packet that he needs to complete before opioid visit in July. HE would like it mailed to him at:  [address] [place] [address], [place] [address]  Thank you.

## 2024-04-25 NOTE — PROGRESS NOTES
Assessment/Plan:    73 y/o male with: cervical radicular symptoms s/p cervical fusion and neuropathy on chronic opiate symptoms. Discussed treatment options at length with risks and benefits. PAPDMP reviewed and refilled. Pt to return for new opiate visit. Discussed supportive care and return parameters.     No problem-specific Assessment & Plan notes found for this encounter.       Diagnoses and all orders for this visit:    Cervical radicular pain    Continuous opioid dependence (HCC)    Neuropathy  -     oxyCODONE-acetaminophen (PERCOCET) 5-325 mg per tablet; Take 1 tablet by mouth every 8 (eight) hours as needed for moderate pain Max Daily Amount: 3 tablets    S/P cervical spinal fusion          Subjective:     Chief Complaint   Patient presents with    Follow-up     6 mos check up, pt had no complaints.        Patient ID: Abhinav Sierra is a 74 y.o. male.    Patient is a 73 y/o male who presents for follow-up on cervical radicular symptoms s/p cervical fusion and neuropathy on chronic opiate symptoms. Pt admits breakthrough pain and admits everything he had tried had not helped he requested restarting Percocet for breakthrough symptoms no fevers chills nausea or vomiting.        The following portions of the patient's history were reviewed and updated as appropriate: allergies, current medications, past family history, past medical history, past social history, past surgical history and problem list.    Review of Systems   Constitutional: Negative.    HENT: Negative.     Eyes: Negative.    Respiratory: Negative.     Cardiovascular: Negative.    Gastrointestinal: Negative.    Endocrine: Negative.    Genitourinary: Negative.    Musculoskeletal:  Positive for arthralgias, back pain, myalgias and neck pain.   Allergic/Immunologic: Negative.    Neurological: Negative.    Hematological: Negative.    Psychiatric/Behavioral: Negative.     All other systems reviewed and are negative.        Objective:      /72 (BP  "Location: Left arm, Patient Position: Sitting, Cuff Size: Large)   Pulse 70   Temp 97.6 °F (36.4 °C) (Tympanic)   Ht 5' 10\" (1.778 m)   Wt 101 kg (223 lb 3.2 oz)   SpO2 98%   BMI 32.03 kg/m²          Physical Exam  Constitutional:       Appearance: He is well-developed.   HENT:      Head: Atraumatic.      Right Ear: External ear normal.      Left Ear: External ear normal.   Eyes:      Conjunctiva/sclera: Conjunctivae normal.      Pupils: Pupils are equal, round, and reactive to light.   Cardiovascular:      Rate and Rhythm: Normal rate and regular rhythm.      Heart sounds: Normal heart sounds.   Pulmonary:      Effort: Pulmonary effort is normal. No respiratory distress.      Breath sounds: Normal breath sounds.   Abdominal:      General: There is no distension.      Palpations: Abdomen is soft.      Tenderness: There is no abdominal tenderness. There is no guarding or rebound.   Musculoskeletal:         General: Normal range of motion.      Cervical back: Normal range of motion.   Skin:     General: Skin is warm and dry.   Neurological:      Mental Status: He is alert and oriented to person, place, and time.      Cranial Nerves: No cranial nerve deficit.   Psychiatric:         Behavior: Behavior normal.         Thought Content: Thought content normal.         Judgment: Judgment normal.         "

## 2024-05-29 DIAGNOSIS — E78.5 HYPERLIPIDEMIA, UNSPECIFIED HYPERLIPIDEMIA TYPE: ICD-10-CM

## 2024-05-29 RX ORDER — ATORVASTATIN CALCIUM 40 MG/1
40 TABLET, FILM COATED ORAL DAILY
Qty: 90 TABLET | Refills: 0 | Status: SHIPPED | OUTPATIENT
Start: 2024-05-29

## 2024-05-29 RX ORDER — LISINOPRIL 40 MG/1
TABLET ORAL
Qty: 90 TABLET | Refills: 0 | Status: SHIPPED | OUTPATIENT
Start: 2024-05-29

## 2024-06-01 DIAGNOSIS — E87.6 HYPOKALEMIA: ICD-10-CM

## 2024-06-01 DIAGNOSIS — K21.9 GASTROESOPHAGEAL REFLUX DISEASE WITHOUT ESOPHAGITIS: ICD-10-CM

## 2024-06-01 RX ORDER — PANTOPRAZOLE SODIUM 40 MG/1
TABLET, DELAYED RELEASE ORAL
Qty: 90 TABLET | Refills: 1 | Status: SHIPPED | OUTPATIENT
Start: 2024-06-01

## 2024-06-03 RX ORDER — POTASSIUM CHLORIDE 600 MG/1
8 TABLET, FILM COATED, EXTENDED RELEASE ORAL EVERY MORNING
Qty: 90 TABLET | Refills: 0 | Status: SHIPPED | OUTPATIENT
Start: 2024-06-03

## 2024-07-23 ENCOUNTER — TELEPHONE (OUTPATIENT)
Age: 74
End: 2024-07-23

## 2024-07-23 ENCOUNTER — RA CDI HCC (OUTPATIENT)
Dept: OTHER | Facility: HOSPITAL | Age: 74
End: 2024-07-23

## 2024-07-23 NOTE — TELEPHONE ENCOUNTER
Patient asking which office he should be coming to for his appt on 7/30. He states he received a letter with an address but then was doing his e check in and it told him another address. Please call patient to clarify.

## 2024-07-30 ENCOUNTER — OFFICE VISIT (OUTPATIENT)
Dept: FAMILY MEDICINE CLINIC | Facility: CLINIC | Age: 74
End: 2024-07-30
Payer: MEDICARE

## 2024-07-30 VITALS
HEART RATE: 73 BPM | HEIGHT: 70 IN | SYSTOLIC BLOOD PRESSURE: 120 MMHG | DIASTOLIC BLOOD PRESSURE: 60 MMHG | WEIGHT: 210.6 LBS | TEMPERATURE: 97.6 F | BODY MASS INDEX: 30.15 KG/M2 | OXYGEN SATURATION: 97 %

## 2024-07-30 DIAGNOSIS — G89.4 CHRONIC PAIN SYNDROME: Primary | ICD-10-CM

## 2024-07-30 PROCEDURE — G2211 COMPLEX E/M VISIT ADD ON: HCPCS | Performed by: FAMILY MEDICINE

## 2024-07-30 PROCEDURE — 99213 OFFICE O/P EST LOW 20 MIN: CPT | Performed by: FAMILY MEDICINE

## 2024-07-30 NOTE — PROGRESS NOTES
Ambulatory Visit  Name: Abhinav Sierra      : 1950      MRN: [id number]  Encounter Provider: Alexys Daniels MD  Encounter Date: 2024   Encounter department: [facility]    Assessment & Plan   1. Chronic pain syndrome  -     [facility] Prescribed Meds  -     Amphetamine  -     Alprazolam  -     Clonazepam  -     Diazepam  -     Lorazepam  -     Oxazepam  -     Temazepam  -     Gabapentin  -     Pregabalin  -     Cocaine  -     Heroin  -     Buprenorphine  -     Levorphanol  -     Meperidine  -     Naltrexone  -     Fentanyl  -     Methadone  -     Oxycodone  -     Oxymorphone  -     Tapentadol  -     Tramadol  -     THC  -     Codeine, Hydrocodone, Hydromorphone, Morphine  -     Methylphenidate    Treatment Plan: Current treatment with PRN Percocet for breakthrough pain    Treatment Goals: Stay functional    Opiate risks  There are risks associated with opioid medications, including dependence, addiction and tolerance. The patient understands and agrees to use these medications only as prescribed. Potential side effects of the medications include, but are not limited to, constipation, drowsiness, addiction, impaired judgment and risk of fatal overdose if not taken as prescribed. The patient was warned against driving while taking sedation medications.  Sharing medications is a felony. At this point in time, the patient is showing no signs of addiction, abuse, diversion or suicidal ideation.      Patient has a high risk condition (age > 65, BREEZY, renal or hepatic impairment, mental health condition, use of alcohol or other substances). Has been counseled on the specific risks of taking opioids with these conditions and the increased risks including including sedation, increased fall risk, dizziness, addictive potential and death.      Opioid agreement  Pain management agreement was reviewed with patient and signed/updated during visit      PDMP review  [place]  or [place]  reviewed. No red flags were identified; safe to proceed with prescription      discussing prognosis, risks and benefits of treatment options, instructions for management, patient and family education, importance of treatment compliance and risk factor reductions.       Depression Screening and Follow-up Plan: Patient was screened for depression during today's encounter. They screened negative with a PHQ-2 score of 0.         History of Present Illness     Opioid Management:   Type of visit: Follow-up    aching    Current pain description: Back, hip knees    Functional status: Limits activity    Goals of care: Stay functional    Screening Tools/Assessments:    PHQ-2/9:  PHQ-2 score: 0      Opioid agreement:  Active Opioid agreement on file?: No      Naloxone:  Currently prescribed Naloxone (Narcan): No      Other treatments tried/failed:   Rest, heat, cold compresses, acetaminophen, topical patches (OTC), topical creams (OTC) and prescription NSAIDs    Prior referrals:  Physical therapy    Outpatient Morphine Milligram Equivalents Per Day       7/30/24 and after 22.5 MME/Day      Order Name Dose Route Frequency Maximum MME/Day     oxyCODONE-acetaminophen (PERCOCET) 5-325 mg per tablet 1 tablet Oral Every 8 hours PRN 22.5 MME/Day    Total Potential Morphine Milligram Equivalents Per Day 22.5 MME/Day      Calculation Information          oxyCODONE-acetaminophen (PERCOCET) 5-325 mg per tablet    oxyCODONE-acetaminophen 5-325 mg Tabs: maximum daily dose of 15 mg of opioid in combo * morphine equivalence factor of 1.5 = 22.5 MME/Day                                 PDMP Review         Value Time User    PDMP Reviewed  Yes 4/23/2024 10:16 AM Alexys Daniels MD           Review of Systems   Constitutional: Negative.    HENT: Negative.     Eyes: Negative.    Respiratory: Negative.     Cardiovascular: Negative.    Gastrointestinal: Negative.    Endocrine: Negative.    Genitourinary: Negative.    Musculoskeletal:   "Positive for arthralgias and myalgias.   Allergic/Immunologic: Negative.    Neurological: Negative.    Hematological: Negative.    Psychiatric/Behavioral: Negative.     All other systems reviewed and are negative.    Objective     /60 (BP Location: Right arm, Patient Position: Sitting, Cuff Size: Large)   Pulse 73   Temp 97.6 °F (36.4 °C) (Temporal)   Ht 5' 10\" (1.778 m)   Wt 95.5 kg (210 lb 9.6 oz)   SpO2 97%   BMI 30.22 kg/m²     Physical Exam  Vitals reviewed.   Constitutional:       General: He is not in acute distress.     Appearance: He is well-developed. He is not diaphoretic.   HENT:      Head: Normocephalic and atraumatic.      Right Ear: External ear normal.      Left Ear: External ear normal.      Nose: Nose normal.   Eyes:      General: No scleral icterus.        Right eye: No discharge.         Left eye: No discharge.      Conjunctiva/sclera: Conjunctivae normal.      Pupils: Pupils are equal, round, and reactive to light.   Neck:      Thyroid: No thyromegaly.      Trachea: No tracheal deviation.   Cardiovascular:      Rate and Rhythm: Normal rate and regular rhythm.      Heart sounds: Normal heart sounds. No murmur heard.     No friction rub.   Pulmonary:      Effort: Pulmonary effort is normal. No respiratory distress.      Breath sounds: Normal breath sounds. No stridor. No wheezing or rales.   Abdominal:      General: There is no distension.      Palpations: Abdomen is soft. There is no mass.      Tenderness: There is no abdominal tenderness. There is no guarding or rebound.   Musculoskeletal:         General: Normal range of motion.      Cervical back: Normal range of motion and neck supple.   Lymphadenopathy:      Cervical: No cervical adenopathy.   Skin:     General: Skin is warm.   Neurological:      Mental Status: He is alert and oriented to person, place, and time.      Cranial Nerves: No cranial nerve deficit.   Psychiatric:         Behavior: Behavior normal.         Thought " Content: Thought content normal.         Judgment: Judgment normal.

## 2024-07-30 NOTE — PATIENT INSTRUCTIONS
Goals of care:  Maximize your health and quality of life by:   Increasing your level of function and activity  Decreasing the negative effects of pain on your life  Minimizing the risks and side effects of medications and ensuring safe use of opioid medication     Ways for you to help meet your goals:  Maintain a healthy lifestyle. This includes proper nutrition, regular physical activity as able, try for 8 hours of sleep per night, use stress reduction strategies, avoid triggers.      Risks and side effects of opioid use:  Prescription opioids carry serious risks of addiction and  overdose, especially with prolonged use. An opioid overdose,  often marked by slowed breathing, can cause sudden death. The  use of prescription opioids can have a number of side effects as  well, even when taken as directed:  Tolerance--meaning you might need to take more of a medication for the same pain relief  Physical dependence--meaning you have symptoms of withdrawal when a medication is stopped  Increased sensitivity to pain  Constipation  Nausea, vomiting, dry mouth  Sleepiness and dizziness   Confusion  Depression  Low levels of testosterone that can result in lower sex drive, energy, and strength  Itching and sweating    If you are prescribed opioids for pain:  Never take opioids in greater amounts or more often than prescribed.  Help prevent misuse and abuse.        - Never sell or share prescription opioids.        - Never use another person’s prescription opioids.  ‡Store prescription opioids in a secure place and out of reach of others (this may include visitors, children, friends, and family).  Safely dispose of unused prescription opioids: Find your community drug take-back program or your pharmacy mail-back program, or flush them down the toilet, following guidance from the Food and Drug Administration (www.fda.gov/Drugs/ResourcesForYou).  ‡Visit www.cdc.gov/drugoverdose to learn about the risks of opioid abuse and  overdose.  If you believe you may be struggling with addiction, tell your health care provider and ask for guidance or call [facility]’s National Helpline at 8-260-855-FGVF.

## 2024-08-01 PROBLEM — G89.4 CHRONIC PAIN SYNDROME: Status: ACTIVE | Noted: 2024-08-01

## 2024-08-08 ENCOUNTER — TELEPHONE (OUTPATIENT)
Age: 74
End: 2024-08-08

## 2024-08-08 DIAGNOSIS — R73.03 PRE-DIABETES: Primary | ICD-10-CM

## 2024-08-08 NOTE — TELEPHONE ENCOUNTER
Patient going for labs for his urologist and wondered if you could order an A-1C for that visit. Said it hasn't been check in awhile.

## 2024-08-13 ENCOUNTER — TELEPHONE (OUTPATIENT)
Age: 74
End: 2024-08-13

## 2024-08-13 ENCOUNTER — APPOINTMENT (OUTPATIENT)
Dept: LAB | Age: 74
End: 2024-08-13
Payer: MEDICARE

## 2024-08-13 DIAGNOSIS — N40.0 BENIGN PROSTATIC HYPERPLASIA WITHOUT LOWER URINARY TRACT SYMPTOMS: ICD-10-CM

## 2024-08-13 DIAGNOSIS — R73.03 PRE-DIABETES: ICD-10-CM

## 2024-08-13 DIAGNOSIS — Z12.5 SPECIAL SCREENING FOR MALIGNANT NEOPLASM OF PROSTATE: ICD-10-CM

## 2024-08-13 DIAGNOSIS — N40.0 BENIGN PROSTATIC HYPERPLASIA WITHOUT LOWER URINARY TRACT SYMPTOMS: Primary | ICD-10-CM

## 2024-08-13 LAB
EST. AVERAGE GLUCOSE BLD GHB EST-MCNC: 108 MG/DL
HBA1C MFR BLD: 5.4 %
PSA SERPL-MCNC: 1.08 NG/ML (ref 0–4)

## 2024-08-13 PROCEDURE — 36415 COLL VENOUS BLD VENIPUNCTURE: CPT

## 2024-08-13 PROCEDURE — 84153 ASSAY OF PSA TOTAL: CPT

## 2024-08-13 PROCEDURE — 83036 HEMOGLOBIN GLYCOSYLATED A1C: CPT

## 2024-08-13 NOTE — TELEPHONE ENCOUNTER
Patient was there this am for blood work and requested to have his PSA drawn but the order on file has an expected date of 3/25/25. They're asking if we could put in a new PSA order so that they can run the test today prior to his appointment with us on 8/29/24?    CB: [phone]

## 2024-08-22 DIAGNOSIS — E78.5 HYPERLIPIDEMIA, UNSPECIFIED HYPERLIPIDEMIA TYPE: ICD-10-CM

## 2024-08-22 RX ORDER — LISINOPRIL 40 MG/1
TABLET ORAL
Qty: 90 TABLET | Refills: 1 | Status: SHIPPED | OUTPATIENT
Start: 2024-08-22

## 2024-08-22 RX ORDER — ATORVASTATIN CALCIUM 40 MG/1
40 TABLET, FILM COATED ORAL DAILY
Qty: 90 TABLET | Refills: 1 | Status: SHIPPED | OUTPATIENT
Start: 2024-08-22

## 2024-10-10 ENCOUNTER — TELEPHONE (OUTPATIENT)
Age: 74
End: 2024-10-10

## 2024-10-10 NOTE — TELEPHONE ENCOUNTER
Patient contacted the office back this afternoon looking for an update. I did relay provider response, verbalized understanding. Requesting to set up appt just to speak with pcp further for clarity. Scheduled appt tomorrow 10/11/24. Nothing else needed at this time.

## 2024-10-10 NOTE — TELEPHONE ENCOUNTER
Patient called in regards to his next door neighbor having parasite due to her animals and they are all over the neighbors house and neighbors body. Patient is concerned that since that its his neighbor and they live side by side that it will come into his house and he can get these parasites. Patient would like to know if there is any precautions he can take, so he won't get these parasites.    - - -

## 2024-10-10 NOTE — TELEPHONE ENCOUNTER
Do not have physical contact with neighbor and do not go visit or sit on furniture over at their house, pt can consider calling an  and calling if he develops symptoms

## 2024-10-11 ENCOUNTER — OFFICE VISIT (OUTPATIENT)
Age: 74
End: 2024-10-11
Payer: MEDICARE

## 2024-10-11 VITALS
TEMPERATURE: 98.3 F | HEART RATE: 73 BPM | WEIGHT: 203 LBS | HEIGHT: 70 IN | BODY MASS INDEX: 29.06 KG/M2 | DIASTOLIC BLOOD PRESSURE: 60 MMHG | SYSTOLIC BLOOD PRESSURE: 128 MMHG | OXYGEN SATURATION: 98 %

## 2024-10-11 DIAGNOSIS — Z20.7 EXPOSURE TO PARASITIC DISEASE: Primary | ICD-10-CM

## 2024-10-11 PROCEDURE — G2211 COMPLEX E/M VISIT ADD ON: HCPCS | Performed by: FAMILY MEDICINE

## 2024-10-11 PROCEDURE — 99213 OFFICE O/P EST LOW 20 MIN: CPT | Performed by: FAMILY MEDICINE

## 2024-10-11 RX ORDER — PERMETHRIN 50 MG/G
CREAM TOPICAL ONCE
Qty: 60 G | Refills: 0 | Status: SHIPPED | OUTPATIENT
Start: 2024-10-11 | End: 2024-10-11

## 2024-10-14 NOTE — PROGRESS NOTES
"Assessment/Plan:    75 y/o male with: exposure to parasites. Discussed avoiding contact and will give permethrin topically in case pt develops symptoms, discussed possibility of calling an  as well. Discussed supportive care and return parameters.     No problem-specific Assessment & Plan notes found for this encounter.       Diagnoses and all orders for this visit:    Exposure to parasitic disease  -     permethrin (ELIMITE) 5 % cream; Apply topically once for 1 dose          Subjective:     Chief Complaint   Patient presents with    Follow-up     Pt is here to discuss possible parasites. No further concerns CC         Patient ID: Abhinav Sierra is a 74 y.o. male.    Patient is a 75 y/o male who presents for follow-up after neighbor in a row home has been diagnosed with uncertain parasites causing rashes no fevers chills nausea or vomiting. Pt denies any symptoms at this point.        The following portions of the patient's history were reviewed and updated as appropriate: allergies, current medications, past family history, past medical history, past social history, past surgical history and problem list.    Review of Systems   Constitutional: Negative.    HENT: Negative.     Eyes: Negative.    Respiratory: Negative.     Cardiovascular: Negative.    Gastrointestinal: Negative.    Endocrine: Negative.    Genitourinary: Negative.    Musculoskeletal: Negative.    Allergic/Immunologic: Negative.    Neurological: Negative.    Hematological: Negative.    Psychiatric/Behavioral: Negative.     All other systems reviewed and are negative.        Objective:      /60 (BP Location: Right arm, Patient Position: Sitting, Cuff Size: Large)   Pulse 73   Temp 98.3 °F (36.8 °C) (Temporal)   Ht 5' 10\" (1.778 m)   Wt 92.1 kg (203 lb)   SpO2 98%   BMI 29.13 kg/m²          Physical Exam  Constitutional:       Appearance: He is well-developed.   HENT:      Head: Atraumatic.      Right Ear: External ear normal.    "   Left Ear: External ear normal.   Eyes:      Extraocular Movements: EOM normal.      Conjunctiva/sclera: Conjunctivae normal.      Pupils: Pupils are equal, round, and reactive to light.   Cardiovascular:      Rate and Rhythm: Normal rate and regular rhythm.      Heart sounds: Normal heart sounds.   Pulmonary:      Effort: Pulmonary effort is normal. No respiratory distress.      Breath sounds: Normal breath sounds.   Abdominal:      General: There is no distension.      Palpations: Abdomen is soft.      Tenderness: There is no abdominal tenderness. There is no guarding or rebound.   Musculoskeletal:         General: Normal range of motion.      Cervical back: Normal range of motion.   Skin:     General: Skin is warm and dry.   Neurological:      Mental Status: He is alert and oriented to person, place, and time.      Cranial Nerves: No cranial nerve deficit.   Psychiatric:         Mood and Affect: Mood and affect normal.         Behavior: Behavior normal.         Thought Content: Thought content normal.         Judgment: Judgment normal.

## 2024-10-15 ENCOUNTER — TELEPHONE (OUTPATIENT)
Age: 74
End: 2024-10-15

## 2024-10-15 NOTE — TELEPHONE ENCOUNTER
Patient is scheduled to see Dr. Daniels on November 5.  Please change his appointment type to a Medicare wellness.  Thank you

## 2024-10-28 RX ORDER — PERMETHRIN 50 MG/G
1 CREAM TOPICAL ONCE
COMMUNITY
Start: 2024-10-11 | End: 2024-11-05

## 2024-10-29 ENCOUNTER — RA CDI HCC (OUTPATIENT)
Dept: OTHER | Facility: HOSPITAL | Age: 74
End: 2024-10-29

## 2024-11-05 ENCOUNTER — OFFICE VISIT (OUTPATIENT)
Age: 74
End: 2024-11-05
Payer: MEDICARE

## 2024-11-05 VITALS
HEIGHT: 70 IN | OXYGEN SATURATION: 99 % | DIASTOLIC BLOOD PRESSURE: 70 MMHG | BODY MASS INDEX: 29.41 KG/M2 | SYSTOLIC BLOOD PRESSURE: 116 MMHG | WEIGHT: 205.4 LBS | TEMPERATURE: 98.2 F | HEART RATE: 85 BPM

## 2024-11-05 DIAGNOSIS — G89.4 CHRONIC PAIN SYNDROME: ICD-10-CM

## 2024-11-05 DIAGNOSIS — Z00.00 MEDICARE ANNUAL WELLNESS VISIT, SUBSEQUENT: Primary | ICD-10-CM

## 2024-11-05 DIAGNOSIS — G62.9 NEUROPATHY: ICD-10-CM

## 2024-11-05 PROCEDURE — G0439 PPPS, SUBSEQ VISIT: HCPCS | Performed by: FAMILY MEDICINE

## 2024-11-05 PROCEDURE — 99213 OFFICE O/P EST LOW 20 MIN: CPT | Performed by: FAMILY MEDICINE

## 2024-11-05 RX ORDER — OXYCODONE AND ACETAMINOPHEN 5; 325 MG/1; MG/1
1 TABLET ORAL EVERY 8 HOURS PRN
Qty: 60 TABLET | Refills: 0 | Status: SHIPPED | OUTPATIENT
Start: 2024-11-05

## 2024-11-05 NOTE — ASSESSMENT & PLAN NOTE
Discussed supportive care and return parameters. PAPDMP reviewed and refilled.    Orders:    [facility] Prescribed Meds    Amphetamine    Alprazolam    Clonazepam    Diazepam    Lorazepam    Oxazepam    Temazepam    Gabapentin    Pregabalin    Cocaine    Heroin    Buprenorphine    Levorphanol    Meperidine    Naltrexone    Fentanyl    Methadone    Oxycodone    Oxymorphone    Tapentadol    Tramadol    THC    Codeine, Hydrocodone, Hydromorphone, Morphine    Methylphenidate

## 2024-11-05 NOTE — PATIENT INSTRUCTIONS
Medicare Preventive Visit Patient Instructions  Thank you for completing your Welcome to Medicare Visit or Medicare Annual Wellness Visit today. Your next wellness visit will be due in one year (11/6/2025).  The screening/preventive services that you may require over the next 5-10 years are detailed below. Some tests may not apply to you based off risk factors and/or age. Screening tests ordered at today's visit but not completed yet may show as past due. Also, please note that scanned in results may not display below.  Preventive Screenings:  Service Recommendations Previous Testing/Comments   Colorectal Cancer Screening  Colonoscopy    Fecal Occult Blood Test (FOBT)/Fecal Immunochemical Test (FIT)  Fecal DNA/Cologuard Test  Flexible Sigmoidoscopy Age: 45-75 years old   Colonoscopy: every 10 years (May be performed more frequently if at higher risk)  OR  FOBT/FIT: every 1 year  OR  Cologuard: every 3 years  OR  Sigmoidoscopy: every 5 years  Screening may be recommended earlier than age 45 if at higher risk for colorectal cancer. Also, an individualized decision between you and your healthcare provider will decide whether screening between the ages of 76-85 would be appropriate. Colonoscopy: 09/19/2012  FOBT/FIT: Not on file  Cologuard: 08/30/2022  Sigmoidoscopy: Not on file          Prostate Cancer Screening Individualized decision between patient and health care provider in men between ages of 55-69   Medicare will cover every 12 months beginning on the day after your 50th birthday PSA: 1.080 ng/mL           Hepatitis C Screening Once for adults born between 1945 and 1965  More frequently in patients at high risk for Hepatitis C Hep C Antibody: 03/10/2020        Diabetes Screening 1-2 times per year if you're at risk for diabetes or have pre-diabetes Fasting glucose: 95 mg/dL (9/28/2023)  A1C: 5.4 % (8/13/2024)      Cholesterol Screening Once every 5 years if you don't have a lipid disorder. May order more often  based on risk factors. Lipid panel: 09/28/2023         Other Preventive Screenings Covered by Medicare:  Abdominal Aortic Aneurysm (AAA) Screening: covered once if your at risk. You're considered to be at risk if you have a family history of AAA or a male between the age of 65-75 who smoking at least 100 cigarettes in your lifetime.  Lung Cancer Screening: covers low dose CT scan once per year if you meet all of the following conditions: (1) Age 55-77; (2) No signs or symptoms of lung cancer; (3) Current smoker or have quit smoking within the last 15 years; (4) You have a tobacco smoking history of at least 20 pack years (packs per day x number of years you smoked); (5) You get a written order from a healthcare provider.  Glaucoma Screening: covered annually if you're considered high risk: (1) You have diabetes OR (2) Family history of glaucoma OR (3)  aged 50 and older OR (4)  American aged 65 and older  Osteoporosis Screening: covered every 2 years if you meet one of the following conditions: (1) Have a vertebral abnormality; (2) On glucocorticoid therapy for more than 3 months; (3) Have primary hyperparathyroidism; (4) On osteoporosis medications and need to assess response to drug therapy.  HIV Screening: covered annually if you're between the age of 15-65. Also covered annually if you are younger than 15 and older than 65 with risk factors for HIV infection. For pregnant patients, it is covered up to 3 times per pregnancy.    Immunizations:  Immunization Recommendations   Influenza Vaccine Annual influenza vaccination during flu season is recommended for all persons aged >= 6 months who do not have contraindications   Pneumococcal Vaccine   * Pneumococcal conjugate vaccine = PCV13 (Prevnar 13), PCV15 (Vaxneuvance), PCV20 (Prevnar 20)  * Pneumococcal polysaccharide vaccine = PPSV23 (Pneumovax) Adults 19-63 yo with certain risk factors or if 65+ yo  If never received any pneumonia vaccine:  recommend Prevnar 20 (PCV20)  Give PCV20 if previously received 1 dose of PCV13 or PPSV23   Hepatitis B Vaccine 3 dose series if at intermediate or high risk (ex: diabetes, end stage renal disease, liver disease)   Respiratory syncytial virus (RSV) Vaccine - COVERED BY MEDICARE PART D  * RSVPreF3 (Arexvy) CDC recommends that adults 60 years of age and older may receive a single dose of RSV vaccine using shared clinical decision-making (SCDM)   Tetanus (Td) Vaccine - COST NOT COVERED BY MEDICARE PART B Following completion of primary series, a booster dose should be given every 10 years to maintain immunity against tetanus. Td may also be given as tetanus wound prophylaxis.   Tdap Vaccine - COST NOT COVERED BY MEDICARE PART B Recommended at least once for all adults. For pregnant patients, recommended with each pregnancy.   Shingles Vaccine (Shingrix) - COST NOT COVERED BY MEDICARE PART B  2 shot series recommended in those 19 years and older who have or will have weakened immune systems or those 50 years and older     Health Maintenance Due:      Topic Date Due   • Colorectal Cancer Screening  08/30/2025   • Hepatitis C Screening  Completed     Immunizations Due:  There are no preventive care reminders to display for this patient.  Advance Directives   What are advance directives?  Advance directives are legal documents that state your wishes and plans for medical care. These plans are made ahead of time in case you lose your ability to make decisions for yourself. Advance directives can apply to any medical decision, such as the treatments you want, and if you want to donate organs.   What are the types of advance directives?  There are many types of advance directives, and each state has rules about how to use them. You may choose a combination of any of the following:  Living will:  This is a written record of the treatment you want. You can also choose which treatments you do not want, which to limit, and which  to stop at a certain time. This includes surgery, medicine, IV fluid, and tube feedings.   Durable power of  for healthcare (DPAHC):  This is a written record that states who you want to make healthcare choices for you when you are unable to make them for yourself. This person, called a proxy, is usually a family member or a friend. You may choose more than 1 proxy.  Do not resuscitate (DNR) order:  A DNR order is used in case your heart stops beating or you stop breathing. It is a request not to have certain forms of treatment, such as CPR. A DNR order may be included in other types of advance directives.  Medical directive:  This covers the care that you want if you are in a coma, near death, or unable to make decisions for yourself. You can list the treatments you want for each condition. Treatment may include pain medicine, surgery, blood transfusions, dialysis, IV or tube feedings, and a ventilator (breathing machine).  Values history:  This document has questions about your views, beliefs, and how you feel and think about life. This information can help others choose the care that you would choose.  Why are advance directives important?  An advance directive helps you control your care. Although spoken wishes may be used, it is better to have your wishes written down. Spoken wishes can be misunderstood, or not followed. Treatments may be given even if you do not want them. An advance directive may make it easier for your family to make difficult choices about your care.   Weight Management   Why it is important to manage your weight:  Being overweight increases your risk of health conditions such as heart disease, high blood pressure, type 2 diabetes, and certain types of cancer. It can also increase your risk for osteoarthritis, sleep apnea, and other respiratory problems. Aim for a slow, steady weight loss. Even a small amount of weight loss can lower your risk of health problems.  How to lose weight  safely:  A safe and healthy way to lose weight is to eat fewer calories and get regular exercise. You can lose up about 1 pound a week by decreasing the number of calories you eat by 500 calories each day.   Healthy meal plan for weight management:  A healthy meal plan includes a variety of foods, contains fewer calories, and helps you stay healthy. A healthy meal plan includes the following:  Eat whole-grain foods more often.  A healthy meal plan should contain fiber. Fiber is the part of grains, fruits, and vegetables that is not broken down by your body. Whole-grain foods are healthy and provide extra fiber in your diet. Some examples of whole-grain foods are whole-wheat breads and pastas, oatmeal, brown rice, and bulgur.  Eat a variety of vegetables every day.  Include dark, leafy greens such as spinach, kale, dov greens, and mustard greens. Eat yellow and orange vegetables such as carrots, sweet potatoes, and winter squash.   Eat a variety of fruits every day.  Choose fresh or canned fruit (canned in its own juice or light syrup) instead of juice. Fruit juice has very little or no fiber.  Eat low-fat dairy foods.  Drink fat-free (skim) milk or 1% milk. Eat fat-free yogurt and low-fat cottage cheese. Try low-fat cheeses such as mozzarella and other reduced-fat cheeses.  Choose meat and other protein foods that are low in fat.  Choose beans or other legumes such as split peas or lentils. Choose fish, skinless poultry (chicken or turkey), or lean cuts of red meat (beef or pork). Before you cook meat or poultry, cut off any visible fat.   Use less fat and oil.  Try baking foods instead of frying them. Add less fat, such as margarine, sour cream, regular salad dressing and mayonnaise to foods. Eat fewer high-fat foods. Some examples of high-fat foods include french fries, doughnuts, ice cream, and cakes.  Eat fewer sweets.  Limit foods and drinks that are high in sugar. This includes candy, cookies, regular  soda, and sweetened drinks.  Exercise:  Exercise at least 30 minutes per day on most days of the week. Some examples of exercise include walking, biking, dancing, and swimming. You can also fit in more physical activity by taking the stairs instead of the elevator or parking farther away from stores. Ask your healthcare provider about the best exercise plan for you.      © Copyright [employer] 2018 Information is for End User's use only and may not be sold, redistributed or otherwise used for commercial purposes. All illustrations and images included in CareNotes® are the copyrighted property of [employer] or [employer]

## 2024-11-05 NOTE — PROGRESS NOTES
Ambulatory Visit  Name: Abhinav Sierra      : 1950      MRN: [id number]  Encounter Provider: Alexys Daniels MD  Encounter Date: 2024   Encounter department: [facility] PRIMARY CARE    Assessment & Plan  Medicare annual wellness visit, subsequent    Medicare Annual well visit. Discussed various safety and health maintenance issues including healthy diet like the Mediterranean diet, exercise, ample sleep, stress reduction, and healthy weight as tolerated. Discussed supportive care and return parameters.     Orders:    [facility] Prescribed Meds    Amphetamine    Alprazolam    Clonazepam    Diazepam    Lorazepam    Oxazepam    Temazepam    Gabapentin    Pregabalin    Cocaine    Heroin    Buprenorphine    Levorphanol    Meperidine    Naltrexone    Fentanyl    Methadone    Oxycodone    Oxymorphone    Tapentadol    Tramadol    THC    Codeine, Hydrocodone, Hydromorphone, Morphine    Methylphenidate    Chronic pain syndrome    Discussed supportive care and return parameters. PAPDMP reviewed and refilled.    Orders:    [facility] All Prescribed Meds    Amphetamine    Alprazolam    Clonazepam    Diazepam    Lorazepam    Oxazepam    Temazepam    Gabapentin    Pregabalin    Cocaine    Heroin    Buprenorphine    Levorphanol    Meperidine    Naltrexone    Fentanyl    Methadone    Oxycodone    Oxymorphone    Tapentadol    Tramadol    THC    Codeine, Hydrocodone, Hydromorphone, Morphine    Methylphenidate    Neuropathy    Orders:    oxyCODONE-acetaminophen (PERCOCET) 5-325 mg per tablet; Take 1 tablet by mouth every 8 (eight) hours as needed for moderate pain Max Daily Amount: 3 tablets       Preventive health issues were discussed with patient, and age appropriate screening tests were ordered as noted in patient's After Visit Summary. Personalized health advice and appropriate referrals for health education or preventive services given if needed, as noted in patient's After Visit  Summary.    History of Present Illness     Patient is a 74-year-old male who presents for follow-up on chronic pain he admits being well-controlled on medications no fevers chills nausea vomiting patient also here for Medicare annual well visit admits being physically active joints healthy diet he sleeps well no other health maintenance issues       Patient Care Team:  Alexys Daniels MD as PCP - General (Family Medicine)  MD Arun Ji MD Daniel Silverberg, MD    Review of Systems   Constitutional: Negative.    HENT: Negative.     Eyes: Negative.    Respiratory: Negative.     Cardiovascular: Negative.    Gastrointestinal: Negative.    Endocrine: Negative.    Genitourinary: Negative.    Musculoskeletal: Negative.    Allergic/Immunologic: Negative.    Neurological: Negative.    Hematological: Negative.    Psychiatric/Behavioral: Negative.     All other systems reviewed and are negative.    Medical History Reviewed by provider this encounter:       Annual Wellness Visit Questionnaire   Abhinav is here for his Subsequent Wellness visit. Last Medicare Wellness visit information reviewed, patient interviewed and updates made to the record today.      Health Risk Assessment:   Patient rates overall health as fair. Patient feels that their physical health rating is slightly worse. Patient is satisfied with their life. Eyesight was rated as same. Hearing was rated as same. Patient feels that their emotional and mental health rating is same. Patients states they are sometimes angry. Patient states they are sometimes unusually tired/fatigued. Pain experienced in the last 7 days has been some. Patient's pain rating has been 6/10. Patient states that he has experienced weight loss or gain in last 6 months. I don’t eat as much, and have reduced drinking soda    Depression Screening:   PHQ-2 Score: 0      Fall Risk Screening:   In the past year, patient has experienced: no history of falling in past  year      Home Safety:  Patient has trouble with stairs inside or outside of their home. Patient has working smoke alarms and has working carbon monoxide detector. Home safety hazards include: none.     Nutrition:   Current diet is Regular.     Medications:   Patient is currently taking over-the-counter supplements. OTC medications include: see medication list. Patient is able to manage medications.     Activities of Daily Living (ADLs)/Instrumental Activities of Daily Living (IADLs):   Walk and transfer into and out of bed and chair?: Yes  Dress and groom yourself?: Yes    Bathe or shower yourself?: Yes    Feed yourself? Yes  Do your laundry/housekeeping?: Yes  Manage your money, pay your bills and track your expenses?: Yes  Make your own meals?: Yes    Do your own shopping?: Yes    ADL comments: I have a  that helps me about once per month    Previous Hospitalizations:   Any hospitalizations or ED visits within the last 12 months?: No      Advance Care Planning:   Living will: Yes    Durable POA for healthcare: Yes    Advanced directive: Yes      Cognitive Screening:   Provider or family/friend/caregiver concerned regarding cognition?: No    PREVENTIVE SCREENINGS      Cardiovascular Screening:    General: Screening Not Indicated and History Lipid Disorder      Diabetes Screening:     General: Screening Current      Colorectal Cancer Screening:     General: Screening Current      Prostate Cancer Screening:    General: Screening Current      Osteoporosis Screening:    General: Screening Not Indicated      Abdominal Aortic Aneurysm (AAA) Screening:    Risk factors include: age between 65-76 yo and tobacco use        General: Screening Not Indicated      Lung Cancer Screening:     General: Screening Not Indicated      Hepatitis C Screening:    General: Screening Current    Screening, Brief Intervention, and Referral to Treatment (SBIRT)    Screening  Typical number of drinks in a day: 0  Typical number of  drinks in a week: 0  Interpretation: Low risk drinking behavior.    AUDIT-C Screenin) How often did you have a drink containing alcohol in the past year? never  2) How many drinks did you have on a typical day when you were drinking in the past year? 0  3) How often did you have 6 or more drinks on one occasion in the past year? never    AUDIT-C Score: 0  Interpretation: Score 0-3 (male): Negative screen for alcohol misuse    Single Item Drug Screening:  How often have you used an illegal drug (including marijuana) or a prescription medication for non-medical reasons in the past year? never    Single Item Drug Screen Score: 0  Interpretation: Negative screen for possible drug use disorder    Review of Current Opioid Use    Opioid Risk Tool (ORT) Interpretation: Complete Opioid Risk Tool (ORT)    Social Determinants of Health     Financial Resource Strain: Medium Risk (10/19/2023)    Overall Financial Resource Strain (CARDIA)     Difficulty of Paying Living Expenses: Somewhat hard   Food Insecurity: No Food Insecurity (10/29/2024)    Hunger Vital Sign     Worried About Running Out of Food in the Last Year: Never true     Ran Out of Food in the Last Year: Never true   Transportation Needs: No Transportation Needs (10/29/2024)    PRAPARE - Transportation     Lack of Transportation (Medical): No     Lack of Transportation (Non-Medical): No   Housing Stability: Unknown (10/29/2024)    Housing Stability Vital Sign     Unable to Pay for Housing in the Last Year: Patient declined     Number of Times Moved in the Last Year: 0     Homeless in the Last Year: No   Utilities: Not At Risk (10/29/2024)    [facility] Utilities     Threatened with loss of utilities: No     No results found.    Objective     There were no vitals taken for this visit.    Physical Exam  Vitals reviewed.   Constitutional:       General: He is not in acute distress.     Appearance: He is well-developed. He is not diaphoretic.   HENT:      Head:  Normocephalic and atraumatic.      Right Ear: External ear normal.      Left Ear: External ear normal.      Nose: Nose normal.   Eyes:      General: No scleral icterus.        Right eye: No discharge.         Left eye: No discharge.      Conjunctiva/sclera: Conjunctivae normal.      Pupils: Pupils are equal, round, and reactive to light.   Neck:      Thyroid: No thyromegaly.      Trachea: No tracheal deviation.   Cardiovascular:      Rate and Rhythm: Normal rate and regular rhythm.      Heart sounds: Normal heart sounds. No murmur heard.     No friction rub.   Pulmonary:      Effort: Pulmonary effort is normal. No respiratory distress.      Breath sounds: Normal breath sounds. No stridor. No wheezing or rales.   Abdominal:      General: There is no distension.      Palpations: Abdomen is soft. There is no mass.      Tenderness: There is no abdominal tenderness. There is no guarding or rebound.   Musculoskeletal:         General: Normal range of motion.      Cervical back: Normal range of motion and neck supple.   Lymphadenopathy:      Cervical: No cervical adenopathy.   Skin:     General: Skin is warm.   Neurological:      Mental Status: He is alert and oriented to person, place, and time.      Cranial Nerves: No cranial nerve deficit.   Psychiatric:         Behavior: Behavior normal.         Thought Content: Thought content normal.         Judgment: Judgment normal.

## 2024-11-07 ENCOUNTER — OFFICE VISIT (OUTPATIENT)
Dept: UROLOGY | Facility: MEDICAL CENTER | Age: 74
End: 2024-11-07
Payer: MEDICARE

## 2024-11-07 VITALS
HEIGHT: 70 IN | SYSTOLIC BLOOD PRESSURE: 130 MMHG | BODY MASS INDEX: 29.35 KG/M2 | OXYGEN SATURATION: 98 % | WEIGHT: 205 LBS | HEART RATE: 73 BPM | DIASTOLIC BLOOD PRESSURE: 80 MMHG

## 2024-11-07 DIAGNOSIS — E66.01 MORBID (SEVERE) OBESITY DUE TO EXCESS CALORIES (HCC): ICD-10-CM

## 2024-11-07 DIAGNOSIS — L29.3 ITCHING OF MALE GENITALIA: ICD-10-CM

## 2024-11-07 DIAGNOSIS — N40.0 BENIGN PROSTATIC HYPERPLASIA WITHOUT LOWER URINARY TRACT SYMPTOMS: Primary | ICD-10-CM

## 2024-11-07 DIAGNOSIS — N40.1 BPH WITH OBSTRUCTION/LOWER URINARY TRACT SYMPTOMS: ICD-10-CM

## 2024-11-07 DIAGNOSIS — N13.8 BPH WITH OBSTRUCTION/LOWER URINARY TRACT SYMPTOMS: ICD-10-CM

## 2024-11-07 PROCEDURE — 99213 OFFICE O/P EST LOW 20 MIN: CPT | Performed by: UROLOGY

## 2024-11-07 RX ORDER — NYSTATIN AND TRIAMCINOLONE ACETONIDE 100000; 1 [USP'U]/G; MG/G
OINTMENT TOPICAL 2 TIMES DAILY
Qty: 30 G | Refills: 2 | Status: SHIPPED | OUTPATIENT
Start: 2024-11-07

## 2024-11-07 NOTE — PROGRESS NOTES
11/7/2024    Abhinav Sierra  1950  [id number]    1. Benign prostatic hyperplasia without lower urinary tract symptoms       History of Present Illness  74 y.o. male with a history of ***      AUA Symptom Score  AUA SYMPTOM SCORE      Flowsheet Row Most Recent Value   AUA SYMPTOM SCORE    How often have you had a sensation of not emptying your bladder completely after you finished urinating? 1 (P)     How often have you had to urinate again less than two hours after you finished urinating? 1 (P)     How often have you found you stopped and started again several times when you urinate? 1 (P)     How often have you found it difficult to postpone urination? 1 (P)     How often have you had a weak urinary stream? 0 (P)     How often have you had to push or strain to begin urination? 1 (P)     How many times did you most typically get up to urinate from the time you went to bed at night until the time you got up in the morning? 1 (P)     Quality of Life: If you were to spend the rest of your life with your urinary condition just the way it is now, how would you feel about that? 3 (P)     AUA SYMPTOM SCORE 6 (P)              Review of Systems    Past Medical History  Past Medical History:   Diagnosis Date    Arthritis     Coronary artery disease     Hypertension     Obesity        Past Social History  Past Surgical History:   Procedure Laterality Date    CERVICAL DISCECTOMY      KNEE SURGERY      SPINE SURGERY         Past Family History  Family History   Problem Relation Age of Onset    Cancer Father     Cancer Mother     Dementia Mother        Past Social history  Social History     Socioeconomic History    Marital status:      Spouse name: Not on file    Number of children: Not on file    Years of education: Not on file    Highest education level: Not on file   Occupational History    Occupation: retired   Tobacco Use    Smoking status: Former    Smokeless tobacco: Former    Tobacco comments:     quit 10  years ago pipe, NEVER A SMOKER AS PER [employer]   Vaping Use    Vaping status: Never Used   Substance and Sexual Activity    Alcohol use: No    Drug use: No    Sexual activity: Never     Partners: Female     Birth control/protection: Abstinence   Other Topics Concern    Not on file   Social History Narrative    Pt states he has about 2-3 cups of cold Ice Tea daily     Social Determinants of Health     Financial Resource Strain: Medium Risk (10/19/2023)    Overall Financial Resource Strain (CARDIA)     Difficulty of Paying Living Expenses: Somewhat hard   Food Insecurity: No Food Insecurity (11/5/2024)    Hunger Vital Sign     Worried About Running Out of Food in the Last Year: Never true     Ran Out of Food in the Last Year: Never true   Transportation Needs: No Transportation Needs (11/5/2024)    PRAPARE - Transportation     Lack of Transportation (Medical): No     Lack of Transportation (Non-Medical): No   Physical Activity: Inactive (8/11/2022)    Exercise Vital Sign     Days of Exercise per Week: 0 days     Minutes of Exercise per Session: 0 min   Stress: No Stress Concern Present (8/11/2022)    Citizen of [place] of Occupational Health - Occupational Stress Questionnaire     Feeling of Stress : Not at all   Social Connections: Socially Isolated (8/11/2022)    Social Connection and Isolation Panel [NHANES]     Frequency of Communication with Friends and Family: More than three times a week     Frequency of Social Gatherings with Friends and Family: More than three times a week     Attends Church Services: Never     Active Member of Clubs or Organizations: No     Attends Club or Organization Meetings: Never     Marital Status:    Intimate Partner Violence: Not At Risk (8/11/2022)    Humiliation, Afraid, Rape, and Kick questionnaire     Fear of Current or Ex-Partner: No     Emotionally Abused: No     Physically Abused: No     Sexually Abused: No   Housing Stability: Low Risk  (11/5/2024)    Housing  Stability Vital Sign     Unable to Pay for Housing in the Last Year: No     Number of Times Moved in the Last Year: 0     Homeless in the Last Year: No       Current Medications  Current Outpatient Medications   Medication Sig Dispense Refill    aspirin (ECOTRIN LOW STRENGTH) 81 mg EC tablet Take 81 mg by mouth daily      atorvastatin (LIPITOR) 40 mg tablet TAKE ONE TABLET BY MOUTH ONCE DAILY 90 tablet 1    Cholecalciferol (VITAMIN D-3) 5000 units TABS take 1 tablet daily      cyanocobalamin (VITAMIN B-12) 1000 MCG tablet Take 1,000 mcg by mouth daily      gabapentin (NEURONTIN) 100 mg capsule TAKE ONE CAPSULE BY MOUTH FOUR TIMES A DAY. 360 capsule 1    oxyCODONE-acetaminophen (PERCOCET) 5-325 mg per tablet Take 1 tablet by mouth every 8 (eight) hours as needed for moderate pain Max Daily Amount: 3 tablets 60 tablet 0    pantoprazole (PROTONIX) 40 mg tablet TAKE 1 TABLET BY MOUTH EVERY DAY IN THE MORNING BEFORE BREAKFAST. 90 tablet 1    potassium chloride (KLOR-CON) 8 MEQ tablet TAKE ONE TABLET BY MOUTH EVERY DAY IN THE MORNING. 90 tablet 0    Acetaminophen (TYLENOL ARTHRITIS PAIN PO) Take by mouth daily      amoxicillin (AMOXIL) 500 mg capsule Only dental work (Patient not taking: Reported on 11/7/2024)      famotidine (PEPCID) 40 MG tablet Take 40 mg by mouth daily      lisinopril (ZESTRIL) 40 mg tablet TAKE ONE TABLET BY MOUTH ONCE DAILY (Patient not taking: Reported on 10/11/2024) 90 tablet 1    nystatin-triamcinolone (MYCOLOG-II) ointment Apply topically 2 (two) times a day 30 g 2     No current facility-administered medications for this visit.       Allergies  Allergies   Allergen Reactions    Ciprofloxacin GI Intolerance    Cephalexin     Oxybutynin GI Intolerance    Tiotropium Bromide Monohydrate GI Intolerance       Past Medical History, Social History, Family History, medications and allergies were reviewed.    Vitals  Vitals:    11/07/24 0834   BP: 130/80   BP Location: Left arm   Patient Position:  "Sitting   Cuff Size: Adult   Pulse: 73   SpO2: 98%   Weight: 93 kg (205 lb)   Height: 5' 10\" (1.778 m)       Physical Exam    Results  Lab Results   Component Value Date    PSA 1.080 08/13/2024    PSA 0.85 09/28/2023    PSA 1.78 08/08/2023     Lab Results   Component Value Date    GLUCOSE 100 06/18/2014    CALCIUM 9.6 09/28/2023     06/18/2014    K 4.4 09/28/2023    CO2 20 (L) 09/28/2023     09/28/2023    BUN 33 (H) 09/28/2023    CREATININE 1.00 09/28/2023     Lab Results   Component Value Date    WBC 6.72 04/11/2023    HGB 14.7 04/11/2023    HCT 45.6 04/11/2023    MCV 97 04/11/2023     04/11/2023       Office Urine Dip  No results found for this or any previous visit (from the past 1 hour(s)).]    "

## 2024-11-07 NOTE — PROGRESS NOTES
11/7/2024    Abhinav Sierra  1950  [id number]    1. Benign prostatic hyperplasia without lower urinary tract symptoms  2. Itching of male genitalia  -     nystatin-triamcinolone (MYCOLOG-II) ointment; Apply topically 2 (two) times a day  3. Morbid (severe) obesity due to excess calories (HCC)  4. BPH with obstruction/lower urinary tract symptoms  Assessment & Plan:  BPH  LUTS stable  Not on any BPH meds  PSA 1.08  - continue to monitor urinary symptoms       History of Present Illness  74 y.o. male with a history of BPH   Taking gabapentin for neuropathy and reports it helps him sleep through the night  Reports urinary symptoms are stable  Does report daytime frequency around q2 hours  Reports normal flow of urine, no pushing/straining to start voiding, but does have some hesitancy   No hematuria or dysuria     Previously did not tolerate BPH medications and not interested in any BPH medications at this time      Does request refills of nystatin-triamcinolone for balanitis        AUA Symptom Score  AUA SYMPTOM SCORE      Flowsheet Row Most Recent Value   AUA SYMPTOM SCORE    How often have you had a sensation of not emptying your bladder completely after you finished urinating? 1 (P)     How often have you had to urinate again less than two hours after you finished urinating? 1 (P)     How often have you found you stopped and started again several times when you urinate? 1 (P)     How often have you found it difficult to postpone urination? 1 (P)     How often have you had a weak urinary stream? 0 (P)     How often have you had to push or strain to begin urination? 1 (P)     How many times did you most typically get up to urinate from the time you went to bed at night until the time you got up in the morning? 1 (P)     Quality of Life: If you were to spend the rest of your life with your urinary condition just the way it is now, how would you feel about that? 3 (P)     AUA SYMPTOM SCORE 6 (P)              Review  of Systems    Past Medical History  Past Medical History:   Diagnosis Date    Arthritis     Coronary artery disease     Hypertension     Obesity        Past Social History  Past Surgical History:   Procedure Laterality Date    CERVICAL DISCECTOMY      KNEE SURGERY      SPINE SURGERY         Past Family History  Family History   Problem Relation Age of Onset    Cancer Father     Cancer Mother     Dementia Mother        Past Social history  Social History     Socioeconomic History    Marital status:      Spouse name: Not on file    Number of children: Not on file    Years of education: Not on file    Highest education level: Not on file   Occupational History    Occupation: retired   Tobacco Use    Smoking status: Former    Smokeless tobacco: Former    Tobacco comments:     quit 10 years ago pipe, NEVER A SMOKER AS PER [employer]   Vaping Use    Vaping status: Never Used   Substance and Sexual Activity    Alcohol use: No    Drug use: No    Sexual activity: Never     Partners: Female     Birth control/protection: Abstinence   Other Topics Concern    Not on file   Social History Narrative    Pt states he has about 2-3 cups of cold Ice Tea daily     Social Determinants of Health     Financial Resource Strain: Medium Risk (10/19/2023)    Overall Financial Resource Strain (CARDIA)     Difficulty of Paying Living Expenses: Somewhat hard   Food Insecurity: No Food Insecurity (11/5/2024)    Hunger Vital Sign     Worried About Running Out of Food in the Last Year: Never true     Ran Out of Food in the Last Year: Never true   Transportation Needs: No Transportation Needs (11/5/2024)    PRAPARE - Transportation     Lack of Transportation (Medical): No     Lack of Transportation (Non-Medical): No   Physical Activity: Inactive (8/11/2022)    Exercise Vital Sign     Days of Exercise per Week: 0 days     Minutes of Exercise per Session: 0 min   Stress: No Stress Concern Present (8/11/2022)    [place] of Occupational  Health - Occupational Stress Questionnaire     Feeling of Stress : Not at all   Social Connections: Socially Isolated (8/11/2022)    Social Connection and Isolation Panel [NHANES]     Frequency of Communication with Friends and Family: More than three times a week     Frequency of Social Gatherings with Friends and Family: More than three times a week     Attends Scientologist Services: Never     Active Member of Clubs or Organizations: No     Attends Club or Organization Meetings: Never     Marital Status:    Intimate Partner Violence: Not At Risk (8/11/2022)    Humiliation, Afraid, Rape, and Kick questionnaire     Fear of Current or Ex-Partner: No     Emotionally Abused: No     Physically Abused: No     Sexually Abused: No   Housing Stability: Low Risk  (11/5/2024)    Housing Stability Vital Sign     Unable to Pay for Housing in the Last Year: No     Number of Times Moved in the Last Year: 0     Homeless in the Last Year: No       Current Medications  Current Outpatient Medications   Medication Sig Dispense Refill    aspirin (ECOTRIN LOW STRENGTH) 81 mg EC tablet Take 81 mg by mouth daily      atorvastatin (LIPITOR) 40 mg tablet TAKE ONE TABLET BY MOUTH ONCE DAILY 90 tablet 1    Cholecalciferol (VITAMIN D-3) 5000 units TABS take 1 tablet daily      cyanocobalamin (VITAMIN B-12) 1000 MCG tablet Take 1,000 mcg by mouth daily      gabapentin (NEURONTIN) 100 mg capsule TAKE ONE CAPSULE BY MOUTH FOUR TIMES A DAY. 360 capsule 1    nystatin-triamcinolone (MYCOLOG-II) ointment Apply topically 2 (two) times a day 30 g 2    oxyCODONE-acetaminophen (PERCOCET) 5-325 mg per tablet Take 1 tablet by mouth every 8 (eight) hours as needed for moderate pain Max Daily Amount: 3 tablets 60 tablet 0    pantoprazole (PROTONIX) 40 mg tablet TAKE 1 TABLET BY MOUTH EVERY DAY IN THE MORNING BEFORE BREAKFAST. 90 tablet 1    potassium chloride (KLOR-CON) 8 MEQ tablet TAKE ONE TABLET BY MOUTH EVERY DAY IN THE MORNING. 90 tablet 0     "Acetaminophen (TYLENOL ARTHRITIS PAIN PO) Take by mouth daily      amoxicillin (AMOXIL) 500 mg capsule Only dental work (Patient not taking: Reported on 11/7/2024)      famotidine (PEPCID) 40 MG tablet Take 40 mg by mouth daily      lisinopril (ZESTRIL) 40 mg tablet TAKE ONE TABLET BY MOUTH ONCE DAILY (Patient not taking: Reported on 10/11/2024) 90 tablet 1     No current facility-administered medications for this visit.       Allergies  Allergies   Allergen Reactions    Ciprofloxacin GI Intolerance    Cephalexin     Oxybutynin GI Intolerance    Tiotropium Bromide Monohydrate GI Intolerance       Past Medical History, Social History, Family History, medications and allergies were reviewed.    Vitals  Vitals:    11/07/24 0834   BP: 130/80   BP Location: Left arm   Patient Position: Sitting   Cuff Size: Adult   Pulse: 73   SpO2: 98%   Weight: 93 kg (205 lb)   Height: 5' 10\" (1.778 m)       Physical Exam    Results  Lab Results   Component Value Date    PSA 1.080 08/13/2024    PSA 0.85 09/28/2023    PSA 1.78 08/08/2023     Lab Results   Component Value Date    GLUCOSE 100 06/18/2014    CALCIUM 9.6 09/28/2023     06/18/2014    K 4.4 09/28/2023    CO2 20 (L) 09/28/2023     09/28/2023    BUN 33 (H) 09/28/2023    CREATININE 1.00 09/28/2023     Lab Results   Component Value Date    WBC 6.72 04/11/2023    HGB 14.7 04/11/2023    HCT 45.6 04/11/2023    MCV 97 04/11/2023     04/11/2023       Office Urine Dip  No results found for this or any previous visit (from the past 1 hour(s)).]    "

## 2024-11-17 ENCOUNTER — APPOINTMENT (EMERGENCY)
Dept: NON INVASIVE DIAGNOSTICS | Facility: HOSPITAL | Age: 74
End: 2024-11-17
Payer: MEDICARE

## 2024-11-17 ENCOUNTER — NURSE TRIAGE (OUTPATIENT)
Dept: OTHER | Facility: OTHER | Age: 74
End: 2024-11-17

## 2024-11-17 ENCOUNTER — HOSPITAL ENCOUNTER (EMERGENCY)
Facility: HOSPITAL | Age: 74
Discharge: HOME | End: 2024-11-17
Attending: EMERGENCY MEDICINE
Payer: MEDICARE

## 2024-11-17 VITALS
RESPIRATION RATE: 18 BRPM | BODY MASS INDEX: 29.7 KG/M2 | SYSTOLIC BLOOD PRESSURE: 132 MMHG | TEMPERATURE: 97.6 F | OXYGEN SATURATION: 99 % | HEIGHT: 70 IN | WEIGHT: 207.45 LBS | HEART RATE: 79 BPM | DIASTOLIC BLOOD PRESSURE: 60 MMHG

## 2024-11-17 DIAGNOSIS — R60.0 BILATERAL LOWER EXTREMITY EDEMA: Primary | ICD-10-CM

## 2024-11-17 DIAGNOSIS — D69.6 THROMBOCYTOPENIA (HCC): ICD-10-CM

## 2024-11-17 DIAGNOSIS — E83.42 HYPOMAGNESEMIA: ICD-10-CM

## 2024-11-17 LAB
ALBUMIN SERPL BCG-MCNC: 3.7 G/DL (ref 3.5–5)
ALP SERPL-CCNC: 94 U/L (ref 34–104)
ALT SERPL W P-5'-P-CCNC: 14 U/L (ref 7–52)
ANION GAP SERPL CALCULATED.3IONS-SCNC: 5 MMOL/L (ref 4–13)
AST SERPL W P-5'-P-CCNC: 14 U/L (ref 13–39)
ATRIAL RATE: 75 BPM
BASOPHILS # BLD AUTO: 0.03 THOUSANDS/ÂΜL (ref 0–0.1)
BASOPHILS NFR BLD AUTO: 1 % (ref 0–1)
BILIRUB SERPL-MCNC: 1.2 MG/DL (ref 0.2–1)
BNP SERPL-MCNC: 83 PG/ML (ref 0–100)
BUN SERPL-MCNC: 26 MG/DL (ref 5–25)
CALCIUM SERPL-MCNC: 8.9 MG/DL (ref 8.4–10.2)
CHLORIDE SERPL-SCNC: 106 MMOL/L (ref 96–108)
CO2 SERPL-SCNC: 31 MMOL/L (ref 21–32)
CREAT SERPL-MCNC: 0.96 MG/DL (ref 0.6–1.3)
EOSINOPHIL # BLD AUTO: 0.19 THOUSAND/ÂΜL (ref 0–0.61)
EOSINOPHIL NFR BLD AUTO: 3 % (ref 0–6)
ERYTHROCYTE [DISTWIDTH] IN BLOOD BY AUTOMATED COUNT: 13.9 % (ref 11.6–15.1)
GFR SERPL CREATININE-BSD FRML MDRD: 77 ML/MIN/1.73SQ M
GLUCOSE SERPL-MCNC: 113 MG/DL (ref 65–140)
HCT VFR BLD AUTO: 38.7 % (ref 36.5–49.3)
HGB BLD-MCNC: 12.6 G/DL (ref 12–17)
IMM GRANULOCYTES # BLD AUTO: 0.02 THOUSAND/UL (ref 0–0.2)
IMM GRANULOCYTES NFR BLD AUTO: 0 % (ref 0–2)
LYMPHOCYTES # BLD AUTO: 1.31 THOUSANDS/ÂΜL (ref 0.6–4.47)
LYMPHOCYTES NFR BLD AUTO: 24 % (ref 14–44)
MAGNESIUM SERPL-MCNC: 1.6 MG/DL (ref 1.9–2.7)
MCH RBC QN AUTO: 31.7 PG (ref 26.8–34.3)
MCHC RBC AUTO-ENTMCNC: 32.6 G/DL (ref 31.4–37.4)
MCV RBC AUTO: 97 FL (ref 82–98)
MONOCYTES # BLD AUTO: 0.49 THOUSAND/ÂΜL (ref 0.17–1.22)
MONOCYTES NFR BLD AUTO: 9 % (ref 4–12)
NEUTROPHILS # BLD AUTO: 3.53 THOUSANDS/ÂΜL (ref 1.85–7.62)
NEUTS SEG NFR BLD AUTO: 63 % (ref 43–75)
NRBC BLD AUTO-RTO: 0 /100 WBCS
P AXIS: 70 DEGREES
PLATELET # BLD AUTO: 146 THOUSANDS/UL (ref 149–390)
PMV BLD AUTO: 10 FL (ref 8.9–12.7)
POTASSIUM SERPL-SCNC: 3.6 MMOL/L (ref 3.5–5.3)
PR INTERVAL: 166 MS
PROT SERPL-MCNC: 6.1 G/DL (ref 6.4–8.4)
QRS AXIS: 70 DEGREES
QRSD INTERVAL: 94 MS
QT INTERVAL: 370 MS
QTC INTERVAL: 413 MS
RBC # BLD AUTO: 3.98 MILLION/UL (ref 3.88–5.62)
SODIUM SERPL-SCNC: 142 MMOL/L (ref 135–147)
T WAVE AXIS: 61 DEGREES
VENTRICULAR RATE: 75 BPM
WBC # BLD AUTO: 5.57 THOUSAND/UL (ref 4.31–10.16)

## 2024-11-17 PROCEDURE — 93970 EXTREMITY STUDY: CPT

## 2024-11-17 PROCEDURE — 85025 COMPLETE CBC W/AUTO DIFF WBC: CPT | Performed by: EMERGENCY MEDICINE

## 2024-11-17 PROCEDURE — 93005 ELECTROCARDIOGRAM TRACING: CPT

## 2024-11-17 PROCEDURE — 36415 COLL VENOUS BLD VENIPUNCTURE: CPT | Performed by: EMERGENCY MEDICINE

## 2024-11-17 PROCEDURE — 83880 ASSAY OF NATRIURETIC PEPTIDE: CPT | Performed by: EMERGENCY MEDICINE

## 2024-11-17 PROCEDURE — 99285 EMERGENCY DEPT VISIT HI MDM: CPT | Performed by: EMERGENCY MEDICINE

## 2024-11-17 PROCEDURE — 83735 ASSAY OF MAGNESIUM: CPT | Performed by: EMERGENCY MEDICINE

## 2024-11-17 PROCEDURE — 93970 EXTREMITY STUDY: CPT | Performed by: SURGERY

## 2024-11-17 PROCEDURE — 99284 EMERGENCY DEPT VISIT MOD MDM: CPT

## 2024-11-17 PROCEDURE — 80053 COMPREHEN METABOLIC PANEL: CPT | Performed by: EMERGENCY MEDICINE

## 2024-11-17 PROCEDURE — 93010 ELECTROCARDIOGRAM REPORT: CPT | Performed by: INTERNAL MEDICINE

## 2024-11-17 RX ORDER — LANOLIN ALCOHOL/MO/W.PET/CERES
800 CREAM (GRAM) TOPICAL ONCE
Status: COMPLETED | OUTPATIENT
Start: 2024-11-17 | End: 2024-11-17

## 2024-11-17 RX ADMIN — Medication 800 MG: at 11:56

## 2024-11-17 NOTE — TELEPHONE ENCOUNTER
Reason for Disposition  • [1] Thigh, calf, or ankle swelling AND [2] bilateral AND [3] 1 side is more swollen    Protocols used: Leg Swelling and Edema-Adult-AH

## 2024-11-17 NOTE — TELEPHONE ENCOUNTER
"Answer Assessment - Initial Assessment Questions  1. ONSET: \"When did the swelling start?\" (e.g., minutes, hours, days)      5 days to a week ago     2. LOCATION: \"What part of the leg is swollen?\"  \"Are both legs swollen or just one leg?\"      Right foot is worse but the left foot is also swollen     3. SEVERITY: \"How bad is the swelling?\" (e.g., localized; mild, moderate, severe)      Swelling up to the ankles       Moderate swelling- pitting edema     4. REDNESS: \"Does the swelling look red or infected?\"      Denies     5. PAIN: \"Is the swelling painful to touch?\" If Yes, ask: \"How painful is it?\"   (Scale 1-10; mild, moderate or severe)      Has peripheral neuropathy- hard to tell pain     6. FEVER: \"Do you have a fever?\" If Yes, ask: \"What is it, how was it measured, and when did it start?\"       Denies     7. CAUSE: \"What do you think is causing the leg swelling?\"      Unknown    8. MEDICAL HISTORY: \"Do you have a history of blood clots (e.g., DVT), cancer, heart failure, kidney disease, or liver failure?\"      Denies         9. RECURRENT SYMPTOM: \"Have you had leg swelling before?\" If Yes, ask: \"When was the last time?\" \"What happened that time?\"      Yes has had it before but not this severe     10. OTHER SYMPTOMS: \"Do you have any other symptoms?\" (e.g., chest pain, difficulty breathing)        Denies chest pain or shortness of breath    Protocols used: Leg Swelling and Edema-Adult-AH    "

## 2024-11-17 NOTE — TELEPHONE ENCOUNTER
Patient calling in requesting an appointment with his PCP tomorrow due to new foot and ankle swelling that started about a week ago. On call provider contacted to determine if patient would be okay to follow up tomorrow vs if she should be seen in the ED to rule out a DVT. On call provider stated it would be best the patient is evaluated today at this local ED. Patient made aware stated he would be able to go to the [facility] ED this AM. Instructed him to follow up with Dr. Daniels next week. Patient verbalized understanding.

## 2024-11-17 NOTE — DISCHARGE INSTRUCTIONS
As discussed, your magnesium was mildly low and your platelets were mildly low.  Please follow-up with your family doctor  There is no evidence of blood clot on your vascular study  Make sure you do leg exercises while sitting in a chair as well as elevate your feet.  Please purchase compression stockings

## 2024-11-17 NOTE — ED PROVIDER NOTES
Time reflects when diagnosis was documented in both MDM as applicable and the Disposition within this note       Time User Action Codes Description Comment    11/17/2024 11:38 AM Bendvenu Sahara L Add [R60.0] Bilateral lower extremity edema     11/17/2024 11:38 AM Bendvenu, Sahara L Add [E83.42] Hypomagnesemia     11/17/2024 11:39 AM Bendock, Sahara L Add [D69.6] Thrombocytopenia (HCC)           ED Disposition       ED Disposition   Discharge    Condition   Stable    Date/Time   Sun Nov 17, 2024 12:13 PM    Comment   Abhinav Sierra discharge to home/self care.                   Assessment & Plan       Medical Decision Making      Differential diagnosis includes but not limited to:  Fracture, stress fracture, DVT, superficial thrombophlebitis, cellulitis, renal failure, ruptured Baker cyst, venous insufficiency, lymphadenitis, lymphedema, trauma    Will check CBC to rule out anemia and leukocytosis, will check CMP to evaluate electrolyte abnormalities as well as renal function.  Will also check urine to check for proteinuria and vascular study rule out STP versus DVT.  I did consider cellulitis however based on history and physical and likely    Amount and/or Complexity of Data Reviewed  Independent Historian:      Details:     Son at bedside      External Data Reviewed: notes.     Details:     Chart review reveals no echocardiogram and no documentation of CHF  Labs: ordered. Decision-making details documented in ED Course.     Details:     No anemia or leukocytosis.  Mild thrombocytopenia  No acute kidney injury or electrolyte abnormalities except for magnesium at 1.6  Pro BNP within normal range      Radiology: ordered. Decision-making details documented in ED Course.     Details:     Vascular study bilateral lower extremities interpreted by technician as no acute DVT, superficial thrombophlebitis            ECG/medicine tests: ordered and independent interpretation performed. Decision-making details documented in  "ED Course.     Details:     No ischemia or arrhythmia    Risk  OTC drugs.        ED Course as of 11/17/24 1324   Sun Nov 17, 2024   1038 Patient is a 74-year-old male coming in today with bilateral foot swelling right greater than left over the past week.  On exam he is well-appearing in no acute distress.  He denies any chest pain, shortness of breath and no no cardiac history.  Will obtain CBC, proBNP, EKG, CMP as well as vascular study of right lower extremity.  Patient agreeable plan of care.      Disclosure: Voice to text software was used in the preparation of this document and could have resulted in translational errors.      Occasional wrong word or \"sound a like\" substitutions may have occurred due to the inherent limitations of voice recognition software.  Read the chart carefully and recognize, using context, where substitutions have occurred.       I have independently reviewed external records are available to me to the level of detail possible within the time constraints of my patient care responsibilities in the ED.       1131 Vascular at bedside.       1132 Labs reviewed and without actionable derangement    Noted new onset thrombocytopenia and magnesium at 1.6 as well as replace        1146 B-Type Natriuretic Peptide(BNP)  Pro BNP - WNL       1150 Per vascular tech negative for DVT bilaterally         1152 Further reviewed patient's medication list.  No noted medications at have adverse effects of lower extremity edema.  Concern for dependent edema and will discuss with patient regarding compression stockings and will give an Ace bandage for home.  Also discussed with him to elevate feet throughout the day and ankle pump exercises.    There is no evidence of endorgan damage or acute injury.  He does mild thrombocytopenia however this should not contribute to his edema.  Negative for DVT, STP or Baker's cyst.  proBNP within normal range and low risk from heart failure       1211 Patient resting in " bed.  Patient's son at bedside.  Long discussion with them regarding lab work, vascular studies.  Patient's son does state that he sits a lot at the day as he has imbalance from his neuropathy.    Long discussion with patient using teds/compression stocks as well as to have activity while he is sitting down as well as leg elevation.  Patient understands.  Son understands as well.  Return to ER instructions given as well as follow-up with PCP    Counseling: I had a detailed discussion with the patient and/or guardian regarding: the historical points, exam findings, and any diagnostic results supporting the discharge diagnosis, lab results, radiology results, discharge instructions reviewed with patient and/or family/caregiver and understanding was verbalized. Instructions given to return to the emergency department if symptoms worsen or persist, or if there are any questions or concerns that arise at home.     All imaging and/or lab testing discussed with patient, strict return to ED precautions discussed. Patient recommended to follow up promptly with appropriate outpatient provider. Patient and/or family members verbalizes understanding and agrees with plan. Patient and/or family members were given opportunity to ask questions, all questions were answered at this time. Patient is stable for discharge           Medications   magnesium Oxide (MAG-OX) tablet 800 mg (800 mg Oral Given 11/17/24 1156)       ED Risk Strat Scores                           SBIRT 20yo+      Flowsheet Row Most Recent Value   Initial Alcohol Screen: US AUDIT-C     1. How often do you have a drink containing alcohol? 0 Filed at: 11/17/2024 1037   2. How many drinks containing alcohol do you have on a typical day you are drinking?  0 Filed at: 11/17/2024 1037   3b. FEMALE Any Age, or MALE 65+: How often do you have 4 or more drinks on one occassion? 0 Filed at: 11/17/2024 1037   Audit-C Score 0 Filed at: 11/17/2024 1037   DAVINA: How many times in  the past year have you...    Used an illegal drug or used a prescription medication for non-medical reasons? Never Filed at: 11/17/2024 1037                            History of Present Illness       Chief Complaint   Patient presents with    Foot Swelling     Arrives with b/l foot swelling ongoing for 5 days. Denies hx of chf. Denies cp/sob. States that he has neuropathy, but not worse than usual       Past Medical History:   Diagnosis Date    Arthritis     Coronary artery disease     Hypertension     Obesity       Past Surgical History:   Procedure Laterality Date    CERVICAL DISCECTOMY      KNEE SURGERY      SPINE SURGERY        Family History   Problem Relation Age of Onset    Cancer Father     Cancer Mother     Dementia Mother       Social History     Tobacco Use    Smoking status: Former    Smokeless tobacco: Former    Tobacco comments:     quit 10 years ago pipe, NEVER A SMOKER AS PER [employer]   Vaping Use    Vaping status: Never Used   Substance Use Topics    Alcohol use: No    Drug use: No      E-Cigarette/Vaping    E-Cigarette Use Never User       E-Cigarette/Vaping Substances      I have reviewed and agree with the history as documented.     Patient is a 74-year-old male with a history of chronic pain syndrome, neuropathy, hypertension, GERD, coronary artery disease coming in today with bilateral lower extremity edema.  Patient reports that it started about 5 to 7 days ago nontraumatic in nature.  He started noticing that was in the foot and progressively coming to the ankles.  On the right leg is extending more proximal.  He has no fevers, chills, cough.  He has no chest pain, palpitations, shortness of breath, dyspnea exertion or syncope.  He denies any history of heart failure.  He has never had a PE or DVT.  No recent travel or recent surgery.  He reports that his neuropathy pain has remained unchanged.      History provided by:  Medical records and patient   used: No    Leg  Pain  Location:  Leg (swelling)  Time since incident:  1 week  Injury: no    Leg location:  R lower leg and L lower leg  Pain details:     Quality:  Aching    Radiates to:  Does not radiate    Severity:  Moderate    Onset quality:  Gradual    Duration:  1 week    Timing:  Constant    Progression:  Unchanged  Chronicity:  New  Dislocation: no    Foreign body present:  No foreign bodies  Tetanus status:  Up to date  Prior injury to area:  No  Relieved by:  None tried  Worsened by:  Nothing  Ineffective treatments:  None tried  Associated symptoms: swelling    Associated symptoms: no back pain, no decreased ROM, no fatigue, no fever, no itching, no muscle weakness, no neck pain, no numbness, no stiffness and no tingling    Risk factors: no concern for non-accidental trauma, no frequent fractures, no known bone disorder and no recent illness        Review of Systems   Constitutional: Negative.  Negative for chills, fatigue and fever.   HENT: Negative.  Negative for ear pain and sore throat.    Eyes: Negative.  Negative for pain and visual disturbance.   Respiratory: Negative.  Negative for cough and shortness of breath.    Cardiovascular:  Positive for leg swelling. Negative for chest pain and palpitations.   Gastrointestinal: Negative.  Negative for abdominal pain and vomiting.   Genitourinary: Negative.  Negative for dysuria and hematuria.   Musculoskeletal: Negative.  Negative for arthralgias, back pain, neck pain and stiffness.   Skin: Negative.  Negative for color change, itching and rash.   Neurological: Negative.  Negative for seizures and syncope.   Hematological: Negative.    Psychiatric/Behavioral: Negative.     All other systems reviewed and are negative.          Objective       ED Triage Vitals [11/17/24 1036]   Temperature Pulse Blood Pressure Respirations SpO2 Patient Position - Orthostatic VS   97.6 °F (36.4 °C) 82 141/63 19 99 % Lying      Temp Source Heart Rate Source BP Location FiO2 (%) Pain Score     Oral Monitor Left arm -- --      Vitals      Date and Time Temp Pulse SpO2 Resp BP Pain Score FACES Pain Rating User   24 1228 -- 79 99 % 18 132/60 -- --    24 1036 97.6 °F (36.4 °C) 82 99 % 19 141/63 -- --             Physical Exam  Vitals and nursing note reviewed.   Constitutional:       General: He is awake. He is not in acute distress.     Appearance: Normal appearance. He is well-developed and overweight.   HENT:      Head: Normocephalic and atraumatic.      Right Ear: External ear normal.      Left Ear: External ear normal.      Nose: Nose normal.      Mouth/Throat:      Mouth: Mucous membranes are moist.   Eyes:      Extraocular Movements: Extraocular movements intact.      Conjunctiva/sclera: Conjunctivae normal.      Pupils: Pupils are equal, round, and reactive to light.   Cardiovascular:      Rate and Rhythm: Normal rate and regular rhythm.      Pulses:           Popliteal pulses are 2+ on the right side and 2+ on the left side.        Dorsalis pedis pulses are 2+ on the right side and 2+ on the left side.      Heart sounds: Normal heart sounds, S1 normal and S2 normal. No murmur heard.     Comments: Bilateral lower extremity edema right greater than left.  No evidence of erythema or rashes.  There is no warmth and no tenderness.  Right lower extremity is 2+ nonpitting with left lower extremity 1+ nonpitting  Pulmonary:      Effort: Pulmonary effort is normal. No respiratory distress.      Breath sounds: Normal breath sounds.   Abdominal:      Palpations: Abdomen is soft.      Tenderness: There is no abdominal tenderness.   Musculoskeletal:         General: No swelling.      Cervical back: Neck supple.      Right lower le+ Edema present.      Left lower le+ Edema present.      Comments: Patient has full AROM of bilateral hips, knees, and ankles painfree.    Skin:     General: Skin is warm and dry.      Capillary Refill: Capillary refill takes less than 2 seconds.   Neurological:       General: No focal deficit present.      Mental Status: He is alert and oriented to person, place, and time.      GCS: GCS eye subscore is 4. GCS verbal subscore is 5. GCS motor subscore is 6.      Cranial Nerves: Cranial nerves 2-12 are intact.      Sensory: Sensation is intact.      Motor: Motor function is intact.      Coordination: Coordination is intact.   Psychiatric:         Mood and Affect: Mood normal.         Behavior: Behavior is cooperative.         Results Reviewed       Procedure Component Value Units Date/Time    B-Type Natriuretic Peptide(BNP) [[id number]]  (Normal) Collected: 11/17/24 1102    Lab Status: Final result Specimen: Blood from Arm, Left Updated: 11/17/24 1143     BNP 83 pg/mL     Comprehensive metabolic panel [[id number]]  (Abnormal) Collected: 11/17/24 1102    Lab Status: Final result Specimen: Blood from Arm, Left Updated: 11/17/24 1131     Sodium 142 mmol/L      Potassium 3.6 mmol/L      Chloride 106 mmol/L      CO2 31 mmol/L      ANION GAP 5 mmol/L      BUN 26 mg/dL      Creatinine 0.96 mg/dL      Glucose 113 mg/dL      Calcium 8.9 mg/dL      AST 14 U/L      ALT 14 U/L      Alkaline Phosphatase 94 U/L      Total Protein 6.1 g/dL      Albumin 3.7 g/dL      Total Bilirubin 1.20 mg/dL      eGFR 77 ml/min/1.73sq m     Narrative:      National Kid[employer] guidelines for Chronic Kidney Disease (CKD):     Stage 1 with normal or high GFR (GFR > 90 mL/min/1.73 square meters)    Stage 2 Mild CKD (GFR = 60-89 mL/min/1.73 square meters)    Stage 3A Moderate CKD (GFR = 45-59 mL/min/1.73 square meters)    Stage 3B Moderate CKD (GFR = 30-44 mL/min/1.73 square meters)    Stage 4 Severe CKD (GFR = 15-29 mL/min/1.73 square meters)    Stage 5 End Stage CKD (GFR <15 mL/min/1.73 square meters)  Note: GFR calculation is accurate only with a steady state creatinine    Magnesium [[id number]]  (Abnormal) Collected: 11/17/24 1102    Lab Status: Final result Specimen: Blood from Arm, Left  Updated: 11/17/24 1131     Magnesium 1.6 mg/dL     CBC and differential [[id number]]  (Abnormal) Collected: 11/17/24 1102    Lab Status: Final result Specimen: Blood from Arm, Left Updated: 11/17/24 1121     WBC 5.57 Thousand/uL      RBC 3.98 Million/uL      Hemoglobin 12.6 g/dL      Hematocrit 38.7 %      MCV 97 fL      MCH 31.7 pg      MCHC 32.6 g/dL      RDW 13.9 %      MPV 10.0 fL      Platelets 146 Thousands/uL      nRBC 0 /100 WBCs      Segmented % 63 %      Immature Grans % 0 %      Lymphocytes % 24 %      Monocytes % 9 %      Eosinophils Relative 3 %      Basophils Relative 1 %      Absolute Neutrophils 3.53 Thousands/µL      Absolute Immature Grans 0.02 Thousand/uL      Absolute Lymphocytes 1.31 Thousands/µL      Absolute Monocytes 0.49 Thousand/µL      Eosinophils Absolute 0.19 Thousand/µL      Basophils Absolute 0.03 Thousands/µL              VAS VENOUS DUPLEX - LOWER LIMB BILATERAL    (Results Pending)       ECG 12 Lead Documentation Only    Date/Time: 11/17/2024 10:59 AM    Performed by: Sahara Pritchett DO  Authorized by: Sahara Pritchett DO    Indications / Diagnosis:  Bilateral lower extremity swelling  ECG reviewed by me, the ED Provider: yes    Patient location:  ED  Previous ECG:     Previous ECG:  Unavailable  Interpretation:     Interpretation: normal    Quality:     Tracing quality:  Limited by artifact  Rate:     ECG rate:  75    ECG rate assessment: normal    Rhythm:     Rhythm: sinus rhythm    Ectopy:     Ectopy: none    QRS:     QRS axis:  Normal    QRS intervals:  Normal  Conduction:     Conduction: normal    ST segments:     ST segments:  Non-specific  T waves:     T waves: non-specific    Comments:      Normal axis  QTc measured at 413 ms      ED Medication and Procedure Management   Prior to Admission Medications   Prescriptions Last Dose Informant Patient Reported? Taking?   Acetaminophen (TYLENOL ARTHRITIS PAIN PO)  Self Yes No   Sig: Take by mouth daily   Cholecalciferol (VITAMIN  D-3) 5000 units TABS  Self Yes Yes   Sig: take 1 tablet daily   amoxicillin (AMOXIL) 500 mg capsule  Self Yes No   Sig: Only dental work   Patient not taking: Reported on 11/7/2024   aspirin (ECOTRIN LOW STRENGTH) 81 mg EC tablet  Self Yes Yes   Sig: Take 81 mg by mouth daily   atorvastatin (LIPITOR) 40 mg tablet   No Yes   Sig: TAKE ONE TABLET BY MOUTH ONCE DAILY   cyanocobalamin (VITAMIN B-12) 1000 MCG tablet  Self Yes Yes   Sig: Take 1,000 mcg by mouth daily   famotidine (PEPCID) 40 MG tablet  Self Yes Yes   Sig: Take 40 mg by mouth daily   gabapentin (NEURONTIN) 100 mg capsule  Self No No   Sig: TAKE ONE CAPSULE BY MOUTH FOUR TIMES A DAY.   Patient taking differently: Take 300 mg by mouth daily at bedtime TAKE ONE CAPSULE BY MOUTH FOUR TIMES A DAY.   lisinopril (ZESTRIL) 40 mg tablet Not Taking  No No   Sig: TAKE ONE TABLET BY MOUTH ONCE DAILY   Patient not taking: Reported on 11/17/2024   nystatin-triamcinolone (MYCOLOG-II) ointment   No No   Sig: Apply topically 2 (two) times a day   oxyCODONE-acetaminophen (PERCOCET) 5-325 mg per tablet   No Yes   Sig: Take 1 tablet by mouth every 8 (eight) hours as needed for moderate pain Max Daily Amount: 3 tablets   pantoprazole (PROTONIX) 40 mg tablet   No Yes   Sig: TAKE 1 TABLET BY MOUTH EVERY DAY IN THE MORNING BEFORE BREAKFAST.   potassium chloride (KLOR-CON) 8 MEQ tablet   No Yes   Sig: TAKE ONE TABLET BY MOUTH EVERY DAY IN THE MORNING.      Facility-Administered Medications: None     Discharge Medication List as of 11/17/2024 12:14 PM        CONTINUE these medications which have NOT CHANGED    Details   aspirin (ECOTRIN LOW STRENGTH) 81 mg EC tablet Take 81 mg by mouth daily, Historical Med      atorvastatin (LIPITOR) 40 mg tablet TAKE ONE TABLET BY MOUTH ONCE DAILY, Starting Thu 8/22/2024, Normal      Cholecalciferol (VITAMIN D-3) 5000 units TABS take 1 tablet daily, Historical Med      cyanocobalamin (VITAMIN B-12) 1000 MCG tablet Take 1,000 mcg by mouth daily,  Historical Med      famotidine (PEPCID) 40 MG tablet Take 40 mg by mouth daily, Historical Med      oxyCODONE-acetaminophen (PERCOCET) 5-325 mg per tablet Take 1 tablet by mouth every 8 (eight) hours as needed for moderate pain Max Daily Amount: 3 tablets, Starting Tue 11/5/2024, Normal      pantoprazole (PROTONIX) 40 mg tablet TAKE 1 TABLET BY MOUTH EVERY DAY IN THE MORNING BEFORE BREAKFAST., Normal      potassium chloride (KLOR-CON) 8 MEQ tablet TAKE ONE TABLET BY MOUTH EVERY DAY IN THE MORNING., Starting Thu 8/29/2024, Normal      Acetaminophen (TYLENOL ARTHRITIS PAIN PO) Take by mouth daily, Historical Med      amoxicillin (AMOXIL) 500 mg capsule Only dental work, Starting Thu 4/12/2018, Historical Med      gabapentin (NEURONTIN) 100 mg capsule TAKE ONE CAPSULE BY MOUTH FOUR TIMES A DAY., Normal      lisinopril (ZESTRIL) 40 mg tablet TAKE ONE TABLET BY MOUTH ONCE DAILY, Normal      nystatin-triamcinolone (MYCOLOG-II) ointment Apply topically 2 (two) times a day, Starting Thu 11/7/2024, Normal           Outpatient Discharge Orders   [facility]     ED SEPSIS DOCUMENTATION   Time reflects when diagnosis was documented in both MDM as applicable and the Disposition within this note       Time User Action Codes Description Comment    11/17/2024 11:38 AM Sahara Pritchett [R60.0] Bilateral lower extremity edema     11/17/2024 11:38 AM Sahara Pritchett [E83.42] Hypomagnesemia     11/17/2024 11:39 AM Sahara Pritchett [D69.6] Thrombocytopenia (HCC)                  Sahara Pritchett DO  11/17/24 9349

## 2024-11-20 ENCOUNTER — OFFICE VISIT (OUTPATIENT)
Age: 74
End: 2024-11-20
Payer: MEDICARE

## 2024-11-20 VITALS
WEIGHT: 202 LBS | DIASTOLIC BLOOD PRESSURE: 62 MMHG | BODY MASS INDEX: 28.92 KG/M2 | HEART RATE: 56 BPM | SYSTOLIC BLOOD PRESSURE: 124 MMHG | OXYGEN SATURATION: 100 % | TEMPERATURE: 98 F | HEIGHT: 70 IN | RESPIRATION RATE: 18 BRPM

## 2024-11-20 DIAGNOSIS — F11.20 CONTINUOUS OPIOID DEPENDENCE (HCC): Primary | ICD-10-CM

## 2024-11-20 DIAGNOSIS — R60.0 BILATERAL LOWER EXTREMITY EDEMA: ICD-10-CM

## 2024-11-20 PROCEDURE — 99213 OFFICE O/P EST LOW 20 MIN: CPT | Performed by: FAMILY MEDICINE

## 2024-11-20 PROCEDURE — G2211 COMPLEX E/M VISIT ADD ON: HCPCS | Performed by: FAMILY MEDICINE

## 2024-11-21 NOTE — PROGRESS NOTES
"Name: Abhinav Sierra      : 1950      MRN: [id number]  Encounter Provider: Alexys Daniels MD  Encounter Date: 2024   Encounter department: [facility] PRIMARY CARE  :  Assessment & Plan  Continuous opioid dependence (HCC)    Orders:    [facility] Prescribed Meds    Amphetamine    Alprazolam    Clonazepam    Diazepam    Lorazepam    Oxazepam    Temazepam    Gabapentin    Pregabalin    Cocaine    Heroin    Buprenorphine    Levorphanol    Meperidine    Naltrexone    Fentanyl    Methadone    Oxycodone    Oxymorphone    Tapentadol    Tramadol    THC    Codeine, Hydrocodone, Hydromorphone, Morphine    Methylphenidate    Bilateral lower extremity edema  Discussed supportive care and return parameters. Encouraged elevating legs, compression.              History of Present Illness     Patient is a 75 y/o male who presents c/o swelling in legs and went to the ED no fevers chills nausea or vomiting.      Review of Systems   Constitutional: Negative.    HENT: Negative.     Eyes: Negative.    Respiratory: Negative.     Cardiovascular: Negative.    Gastrointestinal: Negative.    Endocrine: Negative.    Genitourinary: Negative.    Musculoskeletal: Negative.    Allergic/Immunologic: Negative.    Neurological: Negative.    Hematological: Negative.    Psychiatric/Behavioral: Negative.     All other systems reviewed and are negative.         Objective   /62 (BP Location: Left arm, Patient Position: Sitting, Cuff Size: Large)   Pulse 56   Temp 98 °F (36.7 °C) (Temporal)   Resp 18   Ht 5' 10\" (1.778 m)   Wt 91.6 kg (202 lb)   SpO2 100%   BMI 28.98 kg/m²      Physical Exam  Vitals reviewed.   Constitutional:       General: He is not in acute distress.     Appearance: He is well-developed. He is not diaphoretic.   HENT:      Head: Normocephalic and atraumatic.      Right Ear: External ear normal.      Left Ear: External ear normal.      Nose: Nose normal.   Eyes:      General: No scleral " icterus.        Right eye: No discharge.         Left eye: No discharge.      Conjunctiva/sclera: Conjunctivae normal.      Pupils: Pupils are equal, round, and reactive to light.   Neck:      Thyroid: No thyromegaly.      Trachea: No tracheal deviation.   Cardiovascular:      Rate and Rhythm: Normal rate and regular rhythm.      Heart sounds: Normal heart sounds. No murmur heard.     No friction rub.   Pulmonary:      Effort: Pulmonary effort is normal. No respiratory distress.      Breath sounds: Normal breath sounds. No stridor. No wheezing or rales.   Abdominal:      General: There is no distension.      Palpations: Abdomen is soft. There is no mass.      Tenderness: There is no abdominal tenderness. There is no guarding or rebound.   Musculoskeletal:         General: Normal range of motion.      Cervical back: Normal range of motion and neck supple.   Lymphadenopathy:      Cervical: No cervical adenopathy.   Skin:     General: Skin is warm.   Neurological:      Mental Status: He is alert and oriented to person, place, and time.      Cranial Nerves: No cranial nerve deficit.   Psychiatric:         Behavior: Behavior normal.         Thought Content: Thought content normal.         Judgment: Judgment normal.

## 2024-11-21 NOTE — ASSESSMENT & PLAN NOTE
Orders:    [facility] Prescribed Meds    Amphetamine    Alprazolam    Clonazepam    Diazepam    Lorazepam    Oxazepam    Temazepam    Gabapentin    Pregabalin    Cocaine    Heroin    Buprenorphine    Levorphanol    Meperidine    Naltrexone    Fentanyl    Methadone    Oxycodone    Oxymorphone    Tapentadol    Tramadol    THC    Codeine, Hydrocodone, Hydromorphone, Morphine    Methylphenidate

## 2024-11-26 LAB
7AMINOCLONAZEPAM SAL QL CFM: NEGATIVE NG/ML
AMPHET SAL QL CFM: NEGATIVE NG/ML
BUPRENORPHINE SAL QL SCN: NEGATIVE NG/ML
CARBOXYTHC SAL QL CFM: NEGATIVE NG/ML
CCP IGG SERPL-ACNC: NEGATIVE NG/ML
COCAINE SAL QL CFM: NEGATIVE NG/ML
CODEINE SAL QL CFM: NEGATIVE NG/ML
DXO+LEVORPHANOL SAL QL CFM: NEGATIVE NG/ML
EDDP SAL QL CFM: NEGATIVE NG/ML
GABAPENTIN SAL QL CFM: NORMAL NG/ML
HYDROCODONE SAL QL CFM: NEGATIVE NG/ML
LEUKEMIA MARKERS BLD-IMP: NEGATIVE NG/ML
M PROTEIN 3 UR ELPH-MCNC: NORMAL NG/ML
M TB TUBERC IGNF/MITOGEN IGNF CONTROL: NEGATIVE NG/ML
METHADONE SAL QL CFM: NEGATIVE NG/ML
MORPHINE SAL QL CFM: NEGATIVE NG/ML
NALTREXOL SAL QL CFM: NEGATIVE NG/ML
NALTREXONE SAL QL CFM: NEGATIVE NG/ML
OXYMORPHONE SAL QL CFM: NEGATIVE NG/ML
OXYMORPHONE SAL QL CFM: NEGATIVE NG/ML
PREGABALIN SAL QL CFM: NEGATIVE NG/ML
RESULT ALL_PRESCRIBED MEDS AND SPECIAL INSTRUCTIONS: NORMAL
SL AMB 6-MAM (HEROIN METABOLITE) QUANTIFICATION: NEGATIVE NG/ML
SL AMB ALPRAZOLAM QUANTIFICATION: NEGATIVE NG/ML
SL AMB CLONAZEPAM QUANTIFICATION: NEGATIVE NG/ML
SL AMB DIAZEPAM QUANTIFICATION: NEGATIVE NG/ML
SL AMB FENTANYL QUANTIFICATION: NEGATIVE NG/ML
SL AMB N-DESMETHYL-TRAMADOL QUANTIFICATION SALIVA: NEGATIVE NG/ML
SL AMB NORBUPRENORPHINE QUANTIFICATION: NEGATIVE NG/ML
SL AMB NORDIAZEPAM QUANTIFICATION: NEGATIVE NG/ML
SL AMB NORFENTANYL QUANTIFICATION: NEGATIVE NG/ML
SL AMB NORHYDROCODONE QUANTIFICATION: NEGATIVE NG/ML
SL AMB NORMEPERIDINE QUANTIFICATION: NEGATIVE NG/ML
SL AMB NOROXYCODONE QUANTIFICATION: NORMAL NG/ML
SL AMB OXAZEPAM QUANTIFICATION: NEGATIVE NG/ML
SL AMB RITALINIC ACID QUANTIFICATION: NEGATIVE NG/ML
SL AMB TEMAZEPAM QUANTIFICATION: NEGATIVE NG/ML
SL AMB TEMAZEPAM QUANTIFICATION: NEGATIVE NG/ML
SL AMB TRAMADOL QUANTIFICATION: NEGATIVE NG/ML
SQUAMOUS #/AREA URNS HPF: NEGATIVE NG/ML
TAPENTADOL SAL QL CFM: NEGATIVE NG/ML

## 2024-11-27 ENCOUNTER — TELEPHONE (OUTPATIENT)
Age: 74
End: 2024-11-27

## 2024-11-27 NOTE — TELEPHONE ENCOUNTER
Patient called regarding his lab results. Patient was advised that Dr. Daniels has not had a chance to review the labs at this time, but when he does, someone will call the patient to review.

## 2024-12-03 DIAGNOSIS — E87.6 HYPOKALEMIA: ICD-10-CM

## 2024-12-03 DIAGNOSIS — K21.9 GASTROESOPHAGEAL REFLUX DISEASE WITHOUT ESOPHAGITIS: ICD-10-CM

## 2024-12-05 RX ORDER — POTASSIUM CHLORIDE 600 MG/1
8 TABLET, FILM COATED, EXTENDED RELEASE ORAL EVERY MORNING
Qty: 90 TABLET | Refills: 0 | Status: SHIPPED | OUTPATIENT
Start: 2024-12-05

## 2024-12-05 RX ORDER — PANTOPRAZOLE SODIUM 40 MG/1
TABLET, DELAYED RELEASE ORAL
Qty: 90 TABLET | Refills: 1 | Status: SHIPPED | OUTPATIENT
Start: 2024-12-05

## 2024-12-19 ENCOUNTER — TELEPHONE (OUTPATIENT)
Age: 74
End: 2024-12-19

## 2024-12-19 DIAGNOSIS — G62.9 NEUROPATHY: ICD-10-CM

## 2024-12-20 RX ORDER — GABAPENTIN 100 MG/1
100 CAPSULE ORAL 4 TIMES DAILY
Qty: 360 CAPSULE | Refills: 0 | Status: SHIPPED | OUTPATIENT
Start: 2024-12-20

## 2024-12-20 NOTE — TELEPHONE ENCOUNTER
Patient has called and is wondering why he received 2 messages today referring to this refill. He said one said it is complete and not sent to the pharmacy, and the next message said it was sent to the pharmacy.  He said it is very confusing.

## 2025-01-24 ENCOUNTER — APPOINTMENT (EMERGENCY)
Dept: RADIOLOGY | Facility: HOSPITAL | Age: 75
End: 2025-01-24
Payer: MEDICARE

## 2025-01-24 ENCOUNTER — HOSPITAL ENCOUNTER (INPATIENT)
Facility: HOSPITAL | Age: 75
LOS: 3 days | Discharge: NURSING, REHAB OR LONG-TERM CARE | End: 2025-01-28
Attending: EMERGENCY MEDICINE | Admitting: INTERNAL MEDICINE
Payer: MEDICARE

## 2025-01-24 ENCOUNTER — APPOINTMENT (EMERGENCY)
Dept: CT IMAGING | Facility: HOSPITAL | Age: 75
End: 2025-01-24
Payer: MEDICARE

## 2025-01-24 DIAGNOSIS — R42 LIGHTHEADEDNESS: ICD-10-CM

## 2025-01-24 DIAGNOSIS — S62.629A AVULSION FRACTURE OF MIDDLE PHALANX OF FINGER: ICD-10-CM

## 2025-01-24 DIAGNOSIS — R55 NEAR SYNCOPE: ICD-10-CM

## 2025-01-24 DIAGNOSIS — R55 SYNCOPE AND COLLAPSE: ICD-10-CM
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
## 2025-01-24 LAB
ALBUMIN SERPL BCG-MCNC: 4.3 G/DL (ref 3.5–5)
ALP SERPL-CCNC: 107 U/L (ref 34–104)
ALT SERPL W P-5'-P-CCNC: 11 U/L (ref 7–52)
ANION GAP SERPL CALCULATED.3IONS-SCNC: 10 MMOL/L (ref 4–13)
AST SERPL W P-5'-P-CCNC: 26 U/L (ref 13–39)
ATRIAL RATE: 80 BPM
BASOPHILS # BLD AUTO: 0.04 THOUSANDS/ΜL (ref 0–0.1)
BASOPHILS NFR BLD AUTO: 0 % (ref 0–1)
BILIRUB SERPL-MCNC: 1.21 MG/DL (ref 0.2–1)
BUN SERPL-MCNC: 33 MG/DL (ref 5–25)
CALCIUM SERPL-MCNC: 9.7 MG/DL (ref 8.4–10.2)
CARDIAC TROPONIN I PNL SERPL HS: 5 NG/L (ref ?–50)
CHLORIDE SERPL-SCNC: 106 MMOL/L (ref 96–108)
CO2 SERPL-SCNC: 27 MMOL/L (ref 21–32)
CREAT SERPL-MCNC: 1.04 MG/DL (ref 0.6–1.3)
EOSINOPHIL # BLD AUTO: 0.29 THOUSAND/ΜL (ref 0–0.61)
EOSINOPHIL NFR BLD AUTO: 3 % (ref 0–6)
ERYTHROCYTE [DISTWIDTH] IN BLOOD BY AUTOMATED COUNT: 13.6 % (ref 11.6–15.1)
GFR SERPL CREATININE-BSD FRML MDRD: 70 ML/MIN/1.73SQ M
GLUCOSE SERPL-MCNC: 107 MG/DL (ref 65–140)
HCT VFR BLD AUTO: 42.9 % (ref 36.5–49.3)
HGB BLD-MCNC: 14 G/DL (ref 12–17)
IMM GRANULOCYTES # BLD AUTO: 0.02 THOUSAND/UL (ref 0–0.2)
IMM GRANULOCYTES NFR BLD AUTO: 0 % (ref 0–2)
LYMPHOCYTES # BLD AUTO: 1.75 THOUSANDS/ΜL (ref 0.6–4.47)
LYMPHOCYTES NFR BLD AUTO: 18 % (ref 14–44)
MCH RBC QN AUTO: 31.5 PG (ref 26.8–34.3)
MCHC RBC AUTO-ENTMCNC: 32.6 G/DL (ref 31.4–37.4)
MCV RBC AUTO: 97 FL (ref 82–98)
MONOCYTES # BLD AUTO: 0.79 THOUSAND/ΜL (ref 0.17–1.22)
MONOCYTES NFR BLD AUTO: 8 % (ref 4–12)
NEUTROPHILS # BLD AUTO: 6.67 THOUSANDS/ΜL (ref 1.85–7.62)
NEUTS SEG NFR BLD AUTO: 71 % (ref 43–75)
NRBC BLD AUTO-RTO: 0 /100 WBCS
P AXIS: 66 DEGREES
PLATELET # BLD AUTO: 169 THOUSANDS/UL (ref 149–390)
PMV BLD AUTO: 10.9 FL (ref 8.9–12.7)
POTASSIUM SERPL-SCNC: 4.9 MMOL/L (ref 3.5–5.3)
PR INTERVAL: 142 MS
PROT SERPL-MCNC: 7.3 G/DL (ref 6.4–8.4)
QRS AXIS: 55 DEGREES
QRSD INTERVAL: 92 MS
QT INTERVAL: 370 MS
QTC INTERVAL: 426 MS
RBC # BLD AUTO: 4.44 MILLION/UL (ref 3.88–5.62)
SODIUM SERPL-SCNC: 143 MMOL/L (ref 135–147)
T WAVE AXIS: 48 DEGREES
VENTRICULAR RATE: 80 BPM
WBC # BLD AUTO: 9.56 THOUSAND/UL (ref 4.31–10.16)

## 2025-01-24 PROCEDURE — 84484 ASSAY OF TROPONIN QUANT: CPT

## 2025-01-24 PROCEDURE — 99285 EMERGENCY DEPT VISIT HI MDM: CPT

## 2025-01-24 PROCEDURE — 73130 X-RAY EXAM OF HAND: CPT

## 2025-01-24 PROCEDURE — 70450 CT HEAD/BRAIN W/O DYE: CPT

## 2025-01-24 PROCEDURE — 93005 ELECTROCARDIOGRAM TRACING: CPT

## 2025-01-24 PROCEDURE — 85025 COMPLETE CBC W/AUTO DIFF WBC: CPT

## 2025-01-24 PROCEDURE — 36415 COLL VENOUS BLD VENIPUNCTURE: CPT

## 2025-01-24 PROCEDURE — 80053 COMPREHEN METABOLIC PANEL: CPT

## 2025-01-24 RX ORDER — MAGNESIUM 30 MG
250 TABLET ORAL DAILY
COMMUNITY
End: 2025-01-28

## 2025-01-25 PROBLEM — S62.629A AVULSION FRACTURE OF MIDDLE PHALANX OF FINGER: Status: ACTIVE | Noted: 2025-01-25

## 2025-01-25 PROBLEM — R55 SYNCOPE AND COLLAPSE: Status: ACTIVE | Noted: 2025-01-25

## 2025-01-25 LAB
2HR DELTA HS TROPONIN: 2 NG/L
ANION GAP SERPL CALCULATED.3IONS-SCNC: 7 MMOL/L (ref 4–13)
ATRIAL RATE: 78 BPM
BASOPHILS # BLD AUTO: 0.02 THOUSANDS/ΜL (ref 0–0.1)
BASOPHILS NFR BLD AUTO: 0 % (ref 0–1)
BUN SERPL-MCNC: 35 MG/DL (ref 5–25)
CALCIUM SERPL-MCNC: 9.3 MG/DL (ref 8.4–10.2)
CARDIAC TROPONIN I PNL SERPL HS: 7 NG/L (ref ?–50)
CHLORIDE SERPL-SCNC: 107 MMOL/L (ref 96–108)
CHOLEST SERPL-MCNC: 116 MG/DL (ref ?–200)
CO2 SERPL-SCNC: 30 MMOL/L (ref 21–32)
CREAT SERPL-MCNC: 0.95 MG/DL (ref 0.6–1.3)
EOSINOPHIL # BLD AUTO: 0.32 THOUSAND/ΜL (ref 0–0.61)
EOSINOPHIL NFR BLD AUTO: 4 % (ref 0–6)
ERYTHROCYTE [DISTWIDTH] IN BLOOD BY AUTOMATED COUNT: 13.5 % (ref 11.6–15.1)
EST. AVERAGE GLUCOSE BLD GHB EST-MCNC: 111 MG/DL
GFR SERPL CREATININE-BSD FRML MDRD: 78 ML/MIN/1.73SQ M
GLUCOSE P FAST SERPL-MCNC: 95 MG/DL (ref 65–99)
GLUCOSE SERPL-MCNC: 95 MG/DL (ref 65–140)
HBA1C MFR BLD: 5.5 %
HCT VFR BLD AUTO: 38.4 % (ref 36.5–49.3)
HDLC SERPL-MCNC: 43 MG/DL
HGB BLD-MCNC: 12.7 G/DL (ref 12–17)
IMM GRANULOCYTES # BLD AUTO: 0.02 THOUSAND/UL (ref 0–0.2)
IMM GRANULOCYTES NFR BLD AUTO: 0 % (ref 0–2)
LDLC SERPL CALC-MCNC: 58 MG/DL (ref 0–100)
LYMPHOCYTES # BLD AUTO: 1.86 THOUSANDS/ΜL (ref 0.6–4.47)
LYMPHOCYTES NFR BLD AUTO: 23 % (ref 14–44)
MAGNESIUM SERPL-MCNC: 2 MG/DL (ref 1.9–2.7)
MCH RBC QN AUTO: 31.5 PG (ref 26.8–34.3)
MCHC RBC AUTO-ENTMCNC: 33.1 G/DL (ref 31.4–37.4)
MCV RBC AUTO: 95 FL (ref 82–98)
MONOCYTES # BLD AUTO: 0.76 THOUSAND/ΜL (ref 0.17–1.22)
MONOCYTES NFR BLD AUTO: 10 % (ref 4–12)
NEUTROPHILS # BLD AUTO: 5.01 THOUSANDS/ΜL (ref 1.85–7.62)
NEUTS SEG NFR BLD AUTO: 63 % (ref 43–75)
NONHDLC SERPL-MCNC: 73 MG/DL
NRBC BLD AUTO-RTO: 0 /100 WBCS
P AXIS: 63 DEGREES
PLATELET # BLD AUTO: 151 THOUSANDS/UL (ref 149–390)
PMV BLD AUTO: 10.1 FL (ref 8.9–12.7)
POTASSIUM SERPL-SCNC: 3.9 MMOL/L (ref 3.5–5.3)
PR INTERVAL: 142 MS
QRS AXIS: 57 DEGREES
QRSD INTERVAL: 96 MS
QT INTERVAL: 386 MS
QTC INTERVAL: 440 MS
RBC # BLD AUTO: 4.03 MILLION/UL (ref 3.88–5.62)
SODIUM SERPL-SCNC: 144 MMOL/L (ref 135–147)
T WAVE AXIS: 51 DEGREES
TRIGL SERPL-MCNC: 73 MG/DL (ref ?–150)
TSH SERPL DL<=0.05 MIU/L-ACNC: 2.5 UIU/ML (ref 0.45–4.5)
VENTRICULAR RATE: 78 BPM
VIT B12 SERPL-MCNC: 947 PG/ML (ref 180–914)
WBC # BLD AUTO: 7.99 THOUSAND/UL (ref 4.31–10.16)

## 2025-01-25 PROCEDURE — 85025 COMPLETE CBC W/AUTO DIFF WBC: CPT

## 2025-01-25 PROCEDURE — 80048 BASIC METABOLIC PNL TOTAL CA: CPT

## 2025-01-25 PROCEDURE — 80061 LIPID PANEL: CPT

## 2025-01-25 PROCEDURE — 83735 ASSAY OF MAGNESIUM: CPT

## 2025-01-25 PROCEDURE — 83036 HEMOGLOBIN GLYCOSYLATED A1C: CPT

## 2025-01-25 PROCEDURE — 99204 OFFICE O/P NEW MOD 45 MIN: CPT | Performed by: INTERNAL MEDICINE

## 2025-01-25 PROCEDURE — 82607 VITAMIN B-12: CPT

## 2025-01-25 PROCEDURE — 93005 ELECTROCARDIOGRAM TRACING: CPT

## 2025-01-25 PROCEDURE — 36415 COLL VENOUS BLD VENIPUNCTURE: CPT

## 2025-01-25 PROCEDURE — 84484 ASSAY OF TROPONIN QUANT: CPT

## 2025-01-25 PROCEDURE — 99222 1ST HOSP IP/OBS MODERATE 55: CPT | Performed by: INTERNAL MEDICINE

## 2025-01-25 PROCEDURE — 84443 ASSAY THYROID STIM HORMONE: CPT

## 2025-01-25 RX ORDER — ASPIRIN 81 MG/1
81 TABLET ORAL DAILY
Status: DISCONTINUED | OUTPATIENT
Start: 2025-01-25 | End: 2025-01-28 | Stop reason: HOSPADM

## 2025-01-25 RX ORDER — FAMOTIDINE 20 MG/1
40 TABLET, FILM COATED ORAL DAILY
Status: DISCONTINUED | OUTPATIENT
Start: 2025-01-25 | End: 2025-01-28 | Stop reason: HOSPADM

## 2025-01-25 RX ORDER — PANTOPRAZOLE SODIUM 40 MG/1
40 TABLET, DELAYED RELEASE ORAL
Status: DISCONTINUED | OUTPATIENT
Start: 2025-01-25 | End: 2025-01-28 | Stop reason: HOSPADM

## 2025-01-25 RX ORDER — ENOXAPARIN SODIUM 100 MG/ML
40 INJECTION SUBCUTANEOUS DAILY
Status: DISCONTINUED | OUTPATIENT
Start: 2025-01-25 | End: 2025-01-28 | Stop reason: HOSPADM

## 2025-01-25 RX ORDER — ACETAMINOPHEN 325 MG/1
650 TABLET ORAL EVERY 6 HOURS PRN
Status: DISCONTINUED | OUTPATIENT
Start: 2025-01-25 | End: 2025-01-28 | Stop reason: HOSPADM

## 2025-01-25 RX ORDER — SODIUM CHLORIDE 9 MG/ML
75 INJECTION, SOLUTION INTRAVENOUS CONTINUOUS
Status: DISCONTINUED | OUTPATIENT
Start: 2025-01-25 | End: 2025-01-26

## 2025-01-25 RX ORDER — LISINOPRIL 20 MG/1
40 TABLET ORAL DAILY
Status: DISCONTINUED | OUTPATIENT
Start: 2025-01-25 | End: 2025-01-28 | Stop reason: HOSPADM

## 2025-01-25 RX ADMIN — ASPIRIN 81 MG: 81 TABLET, COATED ORAL at 09:39

## 2025-01-25 RX ADMIN — FAMOTIDINE 40 MG: 20 TABLET, FILM COATED ORAL at 09:39

## 2025-01-25 RX ADMIN — PANTOPRAZOLE SODIUM 40 MG: 40 TABLET, DELAYED RELEASE ORAL at 07:52

## 2025-01-25 RX ADMIN — ENOXAPARIN SODIUM 40 MG: 40 INJECTION SUBCUTANEOUS at 09:39

## 2025-01-25 RX ADMIN — LISINOPRIL 40 MG: 20 TABLET ORAL at 09:39

## 2025-01-25 RX ADMIN — SODIUM CHLORIDE 75 ML/HR: 0.9 INJECTION, SOLUTION INTRAVENOUS at 07:36

## 2025-01-25 NOTE — CONSULTS
Consultation - Cardiology   Name: Abhinav Sierra 74 y.o. male I MRN: [id number]  Unit/Bed#: ED-04 I Date of Admission: 1/24/2025   Date of Service: 1/25/2025 I Hospital Day: 0   Inpatient consult to Cardiology  Consult performed by: Clara Jordan PA-C  Consult ordered by: Geraldo Vargas PA-C        Physician Requesting Evaluation: Juan Cordero DO   Reason for Evaluation / Principal Problem: syncope        TODAY (01/25/25):   Syncopal episode sounds vasovagal, ambulatory dysfunction, or possibly medication induced. No signs of cardiogenic syncope so far.  Will observe on telemetry overnight to look for any high degree AV block  Will check echocardiogram to look at overall structure/function of the heart  Continue lisinopril and statin  Will check TSH  If no arrhythmias observed overnight, would consider 2 week Zio monitor outpatient.       Assessment & Plan  Syncope and collapse  - Presentation of symptoms  Cardiology workup  - troponin trend: 5 > 7  - EKG: NSR, HR 78  - no prior echo  Neurology  - CT head 01/24/25:no intracranial abnormality  - chronic neuropathy on gabapentin and percocet   Endocrine workup  -  will check TSH  Other  - no electrolyte abnormalities or anemia   - orthostatics negative   Benign essential hypertension  - BP stable   - home rx: lisinopril 40 mg daily  Hyperlipidemia  - lipid panel 09/23/24: LDL 75   - home rx: atorvastatin 40 mg daily   History Of Present Illness:  Abhinav Sierra is a 74 year old with PMH of HTN and HLD who presented to [facility]  complaining of a syncopal episode. Workup in the ED showed negative CT of head, negative troponin x 2, and nonischemic EKG.    Discusses with patient and hi son Km. Pt has had three falls/syncopal episodes int the last few months that are known of. He was seen here on 11/17/24 with bilateral extremity edema. No history of CP or SOB. He then had a fall last Wednesday when he got up to go to the bathroom in the middle of the night.  "He was using 2 canes for support. He said he felt \"weird\" and next thing he knew he was on the floor. He has multiple bruises on his left leg from the fall. Yesterday, pt states he was sitting on the couch and went to get up and again had a short \"weird\" feeling and felt. He was using a rollator at this time. He does wear compression socks to help keep the swelling down in his feet. He does see a neurologist at [facility] for chronic neuropathy and prescribed gabapentin and percocet. Tells me at times he can go without weeks using the percocet. He lives alone and stays on the first floor of his house. Denies tobacco, alcohol, or drug use. No family history of CAD.     Pt's son Km talked to be outside the room and has some concerns regarding his dad's overall social situation. He has a neighbor that does look out for him and patient told her he was taking 3 gabapentin at night to help him sleep. His dad organizes his own pills and he is unsure how much of the medications he is really taking throughout the day. He is also known not to use ambulatory devices in the house or will use two canes as crutches for support. He was in a nursing home for rehab a short while after a knee replacement and at that point, he felt like his dad did not do well in situations where he there was push to help him recover. He is interested in talking to case management to discuss option of potential short term rehab, PT eval, or other things that may provide support.    Rhythm History: NSR    Primary Cardiologist: Does not follow out patient cardiology    ROS:  Review of Systems   Constitutional:  Negative for unexpected weight change.   Respiratory:  Negative for chest tightness and shortness of breath.    Cardiovascular:  Positive for leg swelling. Negative for chest pain and palpitations.   Musculoskeletal:  Positive for gait problem.   Neurological:  Positive for dizziness and light-headedness. Negative for syncope.        Past Medical " History:        Past Medical History:   Diagnosis Date    Arthritis     Coronary artery disease     Hypertension     Obesity       Past Surgical History:   Procedure Laterality Date    CERVICAL DISCECTOMY      KNEE SURGERY      SPINE SURGERY         Family History:     Family History   Problem Relation Age of Onset    Cancer Father     Cancer Mother     Dementia Mother         Social History:       Social History     Socioeconomic History    Marital status:      Spouse name: None    Number of children: None    Years of education: None    Highest education level: None   Occupational History    Occupation: retired   Tobacco Use    Smoking status: Former    Smokeless tobacco: Former    Tobacco comments:     quit 10 years ago pipe, NEVER A SMOKER AS PER [employer]   Vaping Use    Vaping status: Never Used   Substance and Sexual Activity    Alcohol use: No    Drug use: No    Sexual activity: Never     Partners: Female     Birth control/protection: Abstinence   Other Topics Concern    None   Social History Narrative    Pt states he has about 2-3 cups of cold Ice Tea daily     Social Drivers of Health     Financial Resource Strain: Medium Risk (10/19/2023)    Overall Financial Resource Strain (CARDIA)     Difficulty of Paying Living Expenses: Somewhat hard   Food Insecurity: No Food Insecurity (11/5/2024)    Hunger Vital Sign     Worried About Running Out of Food in the Last Year: Never true     Ran Out of Food in the Last Year: Never true   Transportation Needs: No Transportation Needs (11/5/2024)    PRAPARE - Transportation     Lack of Transportation (Medical): No     Lack of Transportation (Non-Medical): No   Physical Activity: Inactive (8/11/2022)    Exercise Vital Sign     Days of Exercise per Week: 0 days     Minutes of Exercise per Session: 0 min   Stress: No Stress Concern Present (8/11/2022)    [employer] - Occupational Stress Questionnaire     Feeling of Stress : Not at all    Social Connections: Socially Isolated (8/11/2022)    Social Connection and Isolation Panel [NHANES]     Frequency of Communication with Friends and Family: More than three times a week     Frequency of Social Gatherings with Friends and Family: More than three times a week     Attends Sabianism Services: Never     Active Member of Clubs or Organizations: No     Attends Club or Organization Meetings: Never     Marital Status:    Intimate Partner Violence: Not At Risk (8/11/2022)    Humiliation, Afraid, Rape, and Kick questionnaire     Fear of Current or Ex-Partner: No     Emotionally Abused: No     Physically Abused: No     Sexually Abused: No   Housing Stability: Low Risk  (11/5/2024)    Housing Stability Vital Sign     Unable to Pay for Housing in the Last Year: No     Number of Times Moved in the Last Year: 0     Homeless in the Last Year: No        Allergy:        Allergies   Allergen Reactions    Ciprofloxacin GI Intolerance    Cephalexin     Oxybutynin GI Intolerance    Tiotropium Bromide Monohydrate GI Intolerance       Medications:       Prior to Admission medications    Medication Sig Start Date End Date Taking? Authorizing Provider   Acetaminophen (TYLENOL ARTHRITIS PAIN PO) Take by mouth daily   Yes Historical Provider, MD   amoxicillin (AMOXIL) 500 mg capsule Only dental work 4/12/18  Yes Historical Provider, MD   aspirin (ECOTRIN LOW STRENGTH) 81 mg EC tablet Take 81 mg by mouth daily   Yes Historical Provider, MD   atorvastatin (LIPITOR) 40 mg tablet TAKE ONE TABLET BY MOUTH ONCE DAILY 8/22/24  Yes Alexys Daniels MD   Cholecalciferol (VITAMIN D-3) 5000 units TABS take 1 tablet daily 7/14/15  Yes Historical Provider, MD   cyanocobalamin (VITAMIN B-12) 1000 MCG tablet Take 1,000 mcg by mouth daily   Yes Historical Provider, MD   famotidine (PEPCID) 40 MG tablet Take 40 mg by mouth daily   Yes Historical Provider, MD   lisinopril (ZESTRIL) 40 mg tablet TAKE ONE TABLET BY MOUTH ONCE DAILY  8/22/24  Yes Alexys Daniels MD   magnesium 30 MG tablet Take 250 mg by mouth in the morning   Yes Historical Provider, MD   nystatin-triamcinolone (MYCOLOG-II) ointment Apply topically 2 (two) times a day 11/7/24  Yes Navin Mendoza MD   oxyCODONE-acetaminophen (PERCOCET) 5-325 mg per tablet Take 1 tablet by mouth every 8 (eight) hours as needed for moderate pain Max Daily Amount: 3 tablets 11/5/24  Yes Alexys Daniels MD   pantoprazole (PROTONIX) 40 mg tablet TAKE 1 TABLET BY MOUTH EVERY DAY IN THE MORNING BEFORE BREAKFAST. 12/5/24  Yes Alexys Daniels MD   potassium chloride (KLOR-CON) 8 MEQ tablet TAKE 1 TABLET BY MOUTH EVERY DAY IN THE MORNING. 12/5/24  Yes Alexys Daniels MD   gabapentin (NEURONTIN) 100 mg capsule TAKE ONE CAPSULE BY MOUTH 4 TIMES A DAY.  Patient not taking: Reported on 1/24/2025 12/20/24   Alexys Daniels MD       Vitals:    01/25/25 0740 01/25/25 0743 01/25/25 0745 01/25/25 0748   BP: 125/60 139/59 129/56 135/60   BP Location: Right arm Right arm Right arm Right arm   Pulse: 72  101 (!) 108   Resp: 12 16 20 21   Temp:       TempSrc:       SpO2:       Weight:       Height:           Exam:  Physical Exam  Vitals and nursing note reviewed.   Constitutional:       General: He is not in acute distress.     Appearance: Normal appearance. He is not ill-appearing.   HENT:      Head: Normocephalic and atraumatic.      Nose: Nose normal.      Mouth/Throat:      Mouth: Mucous membranes are moist.   Eyes:      General: No scleral icterus.  Neck:      Vascular: No JVD.   Cardiovascular:      Rate and Rhythm: Normal rate and regular rhythm.      Pulses:           Radial pulses are 2+ on the right side and 2+ on the left side.      Heart sounds: No murmur heard.  Pulmonary:      Effort: Pulmonary effort is normal. No respiratory distress.      Breath sounds: Normal breath sounds. No stridor. No wheezing, rhonchi or rales.   Abdominal:      General: Abdomen is flat.   Musculoskeletal:          General: Swelling (mild edema bilaterally, wearinf compression socks) present. Normal range of motion.      Cervical back: Normal range of motion.      Right lower leg: Edema present.      Left lower leg: Edema present.   Skin:     General: Skin is warm and dry.      Capillary Refill: Capillary refill takes less than 2 seconds.      Findings: Abrasion (2 small abrasions on head) and bruising (yellow/purple brusing over left buttock and calf) present.   Neurological:      Mental Status: He is alert. Mental status is at baseline.   Psychiatric:         Thought Content: Thought content normal.          DATA:        ECG:      Telemetry:   Normal sinus rhythm, . HR 72      Weights:    Wt Readings from Last 10 Encounters:   01/25/25 88.5 kg (195 lb 1.7 oz)   11/20/24 91.6 kg (202 lb)   11/17/24 94.1 kg (207 lb 7.3 oz)   11/07/24 93 kg (205 lb)   11/05/24 93.2 kg (205 lb 6.4 oz)   10/11/24 92.1 kg (203 lb)   07/30/24 95.5 kg (210 lb 9.6 oz)   04/23/24 101 kg (223 lb 3.2 oz)   10/19/23 105 kg (232 lb)   08/17/23 107 kg (236 lb)            Lab Studies:               Results from last 7 days   Lab Units 01/25/25  0730 01/24/25  2319   WBC Thousand/uL 7.99 9.56   HEMOGLOBIN g/dL 12.7 14.0   HEMATOCRIT % 38.4 42.9   PLATELETS Thousands/uL 151 169     Results from last 7 days   Lab Units 01/25/25  0730 01/24/25  2257   POTASSIUM mmol/L 3.9 4.9   CHLORIDE mmol/L 107 106   CO2 mmol/L 30 27   BUN mg/dL 35* 33*   CREATININE mg/dL 0.95 1.04   CALCIUM mg/dL 9.3 9.7   ALK PHOS U/L  --  107*   ALT U/L  --  11   AST U/L  --  26     Clara Jordan PA-C

## 2025-01-25 NOTE — ASSESSMENT & PLAN NOTE
- Presentation of symptoms  Cardiology workup  - troponin trend: 5 > 7  - EKG: NSR, HR 78  - no prior echo  Neurology  - CT head 01/24/25:no intracranial abnormality  - chronic neuropathy on gabapentin and percocet   Endocrine workup  -  will check TSH  Other  - no electrolyte abnormalities or anemia   - orthostatics negative

## 2025-01-25 NOTE — H&P
"H&P - Hospitalist   Name: Abhinav Sierra 74 y.o. male I MRN: [id number]  Unit/Bed#: ED-04 I Date of Admission: 1/24/2025   Date of Service: 1/25/2025 I Hospital Day: 0     Assessment & Plan  Syncope and collapse  Pt reports two episodes of sudden syncope. One occurring last week while he was walking to the bathroom in the middle of the night, and the second occurring yesterday standing to use the bathroom again, pt reports he walked a distance with his walker, then suddenly passed out  Pt denies any preceding lightheaded, dizziness, chest pain, SOB, headache, or neuro symptoms  Pt reports he regain consciousness quickly and was at baseline.  He denies tongue biting, urinary or bowel incontinence, seizure activity.  CT head demonstrating \"No acute intracranial hemorrhage, midline shift, or mass effect. Redemonstrated periventricular white matter abnormality/calcification likely due to prior injury/insult\"  EKG demonstrating no acute ischemic changes, trop 5->7  ECHO pending   Monitor on telemetry  Neuro checks   Obtain orthostatic vital signs   Supportive care, IV fluids  Cardiology consult  Benign essential hypertension  Continue lisinopril  Hyperlipidemia  Continue statin      VTE Pharmacologic Prophylaxis: VTE Score: 3 Moderate Risk (Score 3-4) - Pharmacological DVT Prophylaxis Ordered: enoxaparin (Lovenox).  Code Status: Level 3 - DNAR and DNI   Discussion with family:  Update in AM.     Anticipated Length of Stay: Patient will be admitted on an observation basis with an anticipated length of stay of less than 2 midnights secondary to syncope and collapse.    History of Present Illness   Chief Complaint: \"I keep passing out\"    Abhinav Sierra is a 74 y.o. male with a  who presents with syncope.  Patient reports 2 syncopal episodes in the last 2 weeks.  He reports that he had 1 last Wednesday when he went to use the bathroom in the middle the night.  Patient reports that he stood up out of bed and was walking with " 2 canes for some distance when he suddenly lost consciousness.  He denies any preceding symptoms such as lightheadedness, dizziness, chest pain, or shortness of breath.  Patient reports that he awoke quickly and was at baseline.  Patient reports that he had a similar episode yesterday when he stood to use the bathroom from his recliner.  He was walking with his walker this time for some distance and again suddenly passed out with no preceding symptoms.  Patient does endorse some random dizziness intermittently when turning his head, however he denies this to be associated with any of his syncopal episodes.  Patient denies any chest pain, palpitations, shortness of breath, diaphoresis, nausea, vomiting, or pedal edema.  He denies any tongue biting, seizure-like activity, urinary incontinence or bowel incontinence, headache, or other neurologic symptoms.    Review of Systems   Constitutional:  Positive for fatigue. Negative for appetite change, chills, diaphoresis and fever.   HENT:  Negative for congestion, rhinorrhea and sore throat.    Eyes:  Negative for photophobia and visual disturbance.   Respiratory:  Negative for cough, shortness of breath and wheezing.    Cardiovascular:  Negative for chest pain, palpitations and leg swelling.   Gastrointestinal:  Negative for abdominal distention, abdominal pain, blood in stool, constipation, diarrhea, nausea and vomiting.   Genitourinary:  Negative for dysuria and hematuria.   Musculoskeletal:  Negative for arthralgias, back pain, myalgias and neck stiffness.   Skin:  Negative for color change and rash.   Neurological:  Positive for dizziness (random, intermittent, not associated with syncopal episodes) and syncope. Negative for tremors, seizures, facial asymmetry, speech difficulty, weakness, light-headedness, numbness and headaches.   Psychiatric/Behavioral:  Negative for agitation, behavioral problems and confusion. The patient is not nervous/anxious.    All other  systems reviewed and are negative.      Historical Information   Past Medical History:   Diagnosis Date    Arthritis     Coronary artery disease     Hypertension     Obesity      Past Surgical History:   Procedure Laterality Date    CERVICAL DISCECTOMY      KNEE SURGERY      SPINE SURGERY       Social History     Tobacco Use    Smoking status: Former    Smokeless tobacco: Former    Tobacco comments:     quit 10 years ago pipe, NEVER A SMOKER AS PER [employer]   Vaping Use    Vaping status: Never Used   Substance and Sexual Activity    Alcohol use: No    Drug use: No    Sexual activity: Never     Partners: Female     Birth control/protection: Abstinence     E-Cigarette/Vaping    E-Cigarette Use Never User      E-Cigarette/Vaping Substances    Nicotine No     THC No     CBD No     Flavoring No     Other No     Unknown No      Family History   Problem Relation Age of Onset    Cancer Father     Cancer Mother     Dementia Mother      Social History:  Marital Status:    Patient Pre-hospital Living Situation: Home  Patient Pre-hospital Level of Mobility: walks with walker  Patient Pre-hospital Diet Restrictions: none    Meds/Allergies   I have reviewed home medications using recent Epic encounter.  Prior to Admission medications    Medication Sig Start Date End Date Taking? Authorizing Provider   Acetaminophen (TYLENOL ARTHRITIS PAIN PO) Take by mouth daily   Yes Historical Provider, MD   amoxicillin (AMOXIL) 500 mg capsule Only dental work 4/12/18  Yes Historical Provider, MD   aspirin (ECOTRIN LOW STRENGTH) 81 mg EC tablet Take 81 mg by mouth daily   Yes Historical Provider, MD   atorvastatin (LIPITOR) 40 mg tablet TAKE ONE TABLET BY MOUTH ONCE DAILY 8/22/24  Yes Alexys Daniels MD   Cholecalciferol (VITAMIN D-3) 5000 units TABS take 1 tablet daily 7/14/15  Yes Historical Provider, MD   cyanocobalamin (VITAMIN B-12) 1000 MCG tablet Take 1,000 mcg by mouth daily   Yes Historical Provider, MD   famotidine (PEPCID)  40 MG tablet Take 40 mg by mouth daily   Yes Historical Provider, MD   lisinopril (ZESTRIL) 40 mg tablet TAKE ONE TABLET BY MOUTH ONCE DAILY 8/22/24  Yes Alexys Daniels MD   magnesium 30 MG tablet Take 250 mg by mouth in the morning   Yes Historical Provider, MD   nystatin-triamcinolone (MYCOLOG-II) ointment Apply topically 2 (two) times a day 11/7/24  Yes Navin Mendoza MD   oxyCODONE-acetaminophen (PERCOCET) 5-325 mg per tablet Take 1 tablet by mouth every 8 (eight) hours as needed for moderate pain Max Daily Amount: 3 tablets 11/5/24  Yes Alexys Daniels MD   pantoprazole (PROTONIX) 40 mg tablet TAKE 1 TABLET BY MOUTH EVERY DAY IN THE MORNING BEFORE BREAKFAST. 12/5/24  Yes Alexys Daniels MD   potassium chloride (KLOR-CON) 8 MEQ tablet TAKE 1 TABLET BY MOUTH EVERY DAY IN THE MORNING. 12/5/24  Yes Alexys Daniels MD   gabapentin (NEURONTIN) 100 mg capsule TAKE ONE CAPSULE BY MOUTH 4 TIMES A DAY.  Patient not taking: Reported on 1/24/2025 12/20/24   Alexys Daniels MD     Allergies   Allergen Reactions    Ciprofloxacin GI Intolerance    Cephalexin     Oxybutynin GI Intolerance    Tiotropium Bromide Monohydrate GI Intolerance       Objective :  Temp:  [97.8 °F (36.6 °C)-97.9 °F (36.6 °C)] 97.8 °F (36.6 °C)  HR:  [] 75  BP: (117-144)/(54-88) 127/59  Resp:  [14-21] 14  SpO2:  [97 %-99 %] 98 %  O2 Device: None (Room air)    Physical Exam  Vitals and nursing note reviewed.   Constitutional:       General: He is not in acute distress.     Appearance: He is well-developed. He is not ill-appearing.   HENT:      Head: Normocephalic and atraumatic.      Nose: Nose normal. No congestion.      Mouth/Throat:      Mouth: Mucous membranes are moist.      Pharynx: Oropharynx is clear.   Eyes:      Conjunctiva/sclera: Conjunctivae normal.   Cardiovascular:      Rate and Rhythm: Normal rate and regular rhythm.      Heart sounds: Normal heart sounds. No murmur heard.     No friction rub. No gallop.    Pulmonary:      Effort: Pulmonary effort is normal. No respiratory distress.      Breath sounds: Normal breath sounds. No wheezing, rhonchi or rales.   Abdominal:      General: Bowel sounds are normal. There is no distension.      Palpations: Abdomen is soft.      Tenderness: There is no abdominal tenderness.   Musculoskeletal:      Cervical back: Neck supple.      Right lower leg: No edema.      Left lower leg: No edema.   Skin:     General: Skin is warm and dry.      Capillary Refill: Capillary refill takes less than 2 seconds.   Neurological:      General: No focal deficit present.      Mental Status: He is alert and oriented to person, place, and time. Mental status is at baseline.   Psychiatric:         Mood and Affect: Mood normal.         Behavior: Behavior normal.          Lines/Drains:            Lab Results: I have reviewed the following results:  Results from last 7 days   Lab Units 01/24/25  2319   WBC Thousand/uL 9.56   HEMOGLOBIN g/dL 14.0   HEMATOCRIT % 42.9   PLATELETS Thousands/uL 169   SEGS PCT % 71   LYMPHO PCT % 18   MONO PCT % 8   EOS PCT % 3     Results from last 7 days   Lab Units 01/24/25  2257   SODIUM mmol/L 143   POTASSIUM mmol/L 4.9   CHLORIDE mmol/L 106   CO2 mmol/L 27   BUN mg/dL 33*   CREATININE mg/dL 1.04   ANION GAP mmol/L 10   CALCIUM mg/dL 9.7   ALBUMIN g/dL 4.3   TOTAL BILIRUBIN mg/dL 1.21*   ALK PHOS U/L 107*   ALT U/L 11   AST U/L 26   GLUCOSE RANDOM mg/dL 107             Lab Results   Component Value Date    HGBA1C 5.4 08/13/2024    HGBA1C 5.1 04/11/2023    HGBA1C 5.5 12/01/2021           Imaging Results Review: I reviewed radiology reports from this admission including: CT head and xray(s).  Other Study Results Review: EKG was reviewed.     Administrative Statements   I have spent a total time of 65 minutes in caring for this patient on the day of the visit/encounter including Diagnostic results, Patient and family education, Impressions, Counseling / Coordination of care,  Documenting in the medical record, Reviewing / ordering tests, medicine, procedures  , Obtaining or reviewing history  , and Communicating with other healthcare professionals .    ** Please Note: This note has been constructed using a voice recognition system. **

## 2025-01-25 NOTE — PLAN OF CARE
Problem: Potential for Falls  Goal: Patient will remain free of falls  Description: INTERVENTIONS:  - Educate patient/family on patient safety including physical limitations  - Instruct patient to call for assistance with activity   - Consult OT/PT to assist with strengthening/mobility   - Keep Call bell within reach  - Keep bed low and locked with side rails adjusted as appropriate  - Keep care items and personal belongings within reach  - Initiate and maintain comfort rounds  - Make Fall Risk Sign visible to staff  - Offer Toileting every 2 Hours, in advance of need  - Apply yellow socks and bracelet for high fall risk patients  - Consider moving patient to room near nurses station  Outcome: Progressing     Problem: PAIN - ADULT  Goal: Verbalizes/displays adequate comfort level or baseline comfort level  Description: Interventions:  - Encourage patient to monitor pain and request assistance  - Assess pain using appropriate pain scale  - Administer analgesics based on type and severity of pain and evaluate response  - Implement non-pharmacological measures as appropriate and evaluate response  - Consider cultural and social influences on pain and pain management  - Notify physician/advanced practitioner if interventions unsuccessful or patient reports new pain  Outcome: Progressing     Problem: SAFETY ADULT  Goal: Patient will remain free of falls  Description: INTERVENTIONS:  - Educate patient/family on patient safety including physical limitations  - Instruct patient to call for assistance with activity   - Consult OT/PT to assist with strengthening/mobility   - Keep Call bell within reach  - Keep bed low and locked with side rails adjusted as appropriate  - Keep care items and personal belongings within reach  - Initiate and maintain comfort rounds  - Make Fall Risk Sign visible to staff  - Offer Toileting every 2 Hours, in advance of need  - Apply yellow socks and bracelet for high fall risk patients  -  Consider moving patient to room near nurses station  Outcome: Progressing  Goal: Maintain or return to baseline ADL function  Description: INTERVENTIONS:  -  Assess patient's ability to carry out ADLs; assess patient's baseline for ADL function and identify physical deficits which impact ability to perform ADLs (bathing, care of mouth/teeth, toileting, grooming, dressing, etc.)  - Assess/evaluate cause of self-care deficits   - Assess range of motion  - Assess patient's mobility; develop plan if impaired  - Assess patient's need for assistive devices and provide as appropriate  - Encourage maximum independence but intervene and supervise when necessary  - Involve family in performance of ADLs  - Assess for home care needs following discharge   - Consider OT consult to assist with ADL evaluation and planning for discharge  - Provide patient education as appropriate  Outcome: Progressing  Goal: Maintains/Returns to pre admission functional level  Description: INTERVENTIONS:  - Perform AM-PAC 6 Click Basic Mobility/ Daily Activity assessment daily.  - Set and communicate daily mobility goal to care team and patient/family/caregiver.   - Collaborate with rehabilitation services on mobility goals if consulted  - Perform Range of Motion  times a day.  - Reposition patient every  hours.  - Dangle patient  times a day  - Stand patient  times a day  - Ambulate patient  times a day  - Out of bed to chair  times a day   - Out of bed for meals  times a day  - Out of bed for toileting  - Record patient progress and toleration of activity level   Outcome: Progressing     Problem: MUSCULOSKELETAL - ADULT  Goal: Maintain or return mobility to safest level of function  Description: INTERVENTIONS:  - Assess patient's ability to carry out ADLs; assess patient's baseline for ADL function and identify physical deficits which impact ability to perform ADLs (bathing, care of mouth/teeth, toileting, grooming, dressing, etc.)  -  Assess/evaluate cause of self-care deficits   - Assess range of motion  - Assess patient's mobility  - Assess patient's need for assistive devices and provide as appropriate  - Encourage maximum independence but intervene and supervise when necessary  - Involve family in performance of ADLs  - Assess for home care needs following discharge   - Consider OT consult to assist with ADL evaluation and planning for discharge  - Provide patient education as appropriate  Outcome: Progressing     Problem: INFECTION - ADULT  Goal: Absence or prevention of progression during hospitalization  Description: INTERVENTIONS:  - Assess and monitor for signs and symptoms of infection  - Monitor lab/diagnostic results  - Monitor all insertion sites, i.e. indwelling lines, tubes, and drains  - Monitor endotracheal if appropriate and nasal secretions for changes in amount and color  - Casar appropriate cooling/warming therapies per order  - Administer medications as ordered  - Instruct and encourage patient and family to use good hand hygiene technique  - Identify and instruct in appropriate isolation precautions for identified infection/condition  Outcome: Progressing  Goal: Absence of fever/infection during neutropenic period  Description: INTERVENTIONS:  - Monitor WBC    Outcome: Progressing     Problem: DISCHARGE PLANNING  Goal: Discharge to home or other facility with appropriate resources  Description: INTERVENTIONS:  - Identify barriers to discharge w/patient and caregiver  - Arrange for needed discharge resources and transportation as appropriate  - Identify discharge learning needs (meds, wound care, etc.)  - Arrange for interpretive services to assist at discharge as needed  - Refer to Case Management Department for coordinating discharge planning if the patient needs post-hospital services based on physician/advanced practitioner order or complex needs related to functional status, cognitive ability, or social support  system  Outcome: Progressing     Problem: Knowledge Deficit  Goal: Patient/family/caregiver demonstrates understanding of disease process, treatment plan, medications, and discharge instructions  Description: Complete learning assessment and assess knowledge base.  Interventions:  - Provide teaching at level of understanding  - Provide teaching via preferred learning methods  Outcome: Progressing

## 2025-01-25 NOTE — ASSESSMENT & PLAN NOTE
"Pt reports two episodes of sudden syncope. One occurring last week while he was walking to the bathroom in the middle of the night, and the second occurring yesterday standing to use the bathroom again, pt reports he walked a distance with his walker, then suddenly passed out  Pt denies any preceding lightheaded, dizziness, chest pain, SOB, headache, or neuro symptoms  Pt reports he regain consciousness quickly and was at baseline.  He denies tongue biting, urinary or bowel incontinence, seizure activity.  CT head demonstrating \"No acute intracranial hemorrhage, midline shift, or mass effect. Redemonstrated periventricular white matter abnormality/calcification likely due to prior injury/insult\"  EKG demonstrating no acute ischemic changes, trop 5->7  ECHO pending   Monitor on telemetry  Neuro checks   Obtain orthostatic vital signs   Supportive care, IV fluids  Cardiology consult  "

## 2025-01-25 NOTE — ED PROVIDER NOTES
Time reflects when diagnosis was documented in both MDM as applicable and the Disposition within this note       Time User Action Codes Description Comment    1/25/2025  2:51 AM Roque Foreman Add [W19.XXXA] Fall, initial encounter     1/25/2025  2:51 AM Roque Foreman Add [R55] Near syncope     1/25/2025  2:51 AM Roque Foreman Add [R42] Lightheadedness     1/25/2025  6:44 AM Geraldo Vargas Add [R55] Syncope and collapse     1/25/2025  5:11 PM Juan Cordero Add [S62.629A] Avulsion fracture of middle phalanx of finger           ED Disposition       ED Disposition   Admit    Condition   Stable    Date/Time   Sat Jan 25, 2025  2:51 AM    Comment   Case was discussed with TORI and the patient's admission status was agreed to be Admission Status: observation status to the service of Dr. Dai.               Assessment & Plan       Medical Decision Making  74-year-old male presents to ED for evaluation of possible syncopal episodes as seen in HPI.  On physical examination patient vital signs stable.  Alert and responsive to questions appropriately. Abrasion of the left side of forehead/scalp seen.  No active bleeding.  No hemotympanum bilaterally.  Pupils equal and reactive.  No neurodeficits. GCS 15.  No Hough sign.  No raccoon eyes.  No murmur.  Normal breath sounds.  Abdomen soft, nontender.  Moving all 4 extremities.  Tender to palpation of left third and fourth digits.  Obtained workup consisting of CMP, CBC, troponin, EKG, x-ray left hand, noncon head CT.   Differential diagnosis includes but not limited to electrolyte abnormality, arrhythmia, syncope, MI, ACS, dehydration, STEPHANIE, fracture, contusion, intracranial lesion, intracranial bleed, deconditioning     CMP without significant change in values from previous measures.  Initial troponin of 5 NG/L.  2-hour troponin 7 NG/L.  CBC WNL.  EKG without evidence of acute cardiac injury, arrhythmia.  Noncon head CT without evidence of acute intracranial abnormality.  Left  hand x-ray without clear fracture on personal interpretation on personal interpretation. Defer to official radiology interpretation.     Discussed results of workup with patient. Given results of workup, no clear etiology for his falls found. Patient has concern about going home due to risk of falling again.  He lives alone.  Uses a walker with ambulation.  Discussed disposition options with patient.  After conversation, patient agreeable to admission to [facility] service for further evaluation and long-term care planning.  Discussed with [facility]. Patient accepted to [facility] service.    Amount and/or Complexity of Data Reviewed  Labs: ordered.  Radiology: ordered and independent interpretation performed.    Risk  Decision regarding hospitalization.             Medications   aspirin (ECOTRIN LOW STRENGTH) EC tablet 81 mg (81 mg Oral Given 1/25/25 0939)   famotidine (PEPCID) tablet 40 mg (40 mg Oral Given 1/25/25 0939)   lisinopril (ZESTRIL) tablet 40 mg (40 mg Oral Given 1/25/25 0939)   pantoprazole (PROTONIX) EC tablet 40 mg (40 mg Oral Given 1/25/25 0752)   sodium chloride 0.9 % infusion (75 mL/hr Intravenous New Bag 1/25/25 0736)   acetaminophen (TYLENOL) tablet 650 mg (has no administration in time range)   enoxaparin (LOVENOX) subcutaneous injection 40 mg (40 mg Subcutaneous Given 1/25/25 0939)       ED Risk Strat Scores                          SBIRT 22yo+      Flowsheet Row Most Recent Value   Initial Alcohol Screen: US AUDIT-C     1. How often do you have a drink containing alcohol? 0 Filed at: 01/24/2025 2249   2. How many drinks containing alcohol do you have on a typical day you are drinking?  0 Filed at: 01/24/2025 2249   3b. FEMALE Any Age, or MALE 65+: How often do you have 4 or more drinks on one occassion? 0 Filed at: 01/24/2025 2249   Audit-C Score 0 Filed at: 01/24/2025 2249   DAVINA: How many times in the past year have you...    Used an illegal drug or used a prescription medication for non-medical reasons?  "Never Filed at: 01/24/2025 2249                            History of Present Illness       Chief Complaint   Patient presents with    Syncope     Pt states \"In the last 10 days I've blacked out twice\" most recent episode around 3pm today. States feels himself passing out. Hit head on floor today. Sometimes feels lightheaded. Denies cp, sob. Takes 81mg aspirin       Past Medical History:   Diagnosis Date    Arthritis     Coronary artery disease     Hypertension     Obesity       Past Surgical History:   Procedure Laterality Date    CERVICAL DISCECTOMY      KNEE SURGERY      SPINE SURGERY        Family History   Problem Relation Age of Onset    Cancer Father     Cancer Mother     Dementia Mother       Social History     Tobacco Use    Smoking status: Former    Smokeless tobacco: Former    Tobacco comments:     quit 10 years ago pipe, NEVER A SMOKER AS PER [employer]   Vaping Use    Vaping status: Never Used   Substance Use Topics    Alcohol use: No    Drug use: No      E-Cigarette/Vaping    E-Cigarette Use Never User       E-Cigarette/Vaping Substances    Nicotine No     THC No     CBD No     Flavoring No     Other No     Unknown No       I have reviewed and agree with the history as documented.     74-year-old male with history of CAD, hypertension presents to ED for evaluation of possible syncopal episodes.  Patient states that in the past 10 days he has had 2 episodes where he passes out.  The first episode happened approximately 10 days ago.  It occurred when he was walking to the bathroom at night.  He landed on his left side.  No head strike at that time.  Has been living, performing actives of daily living since.  He uses a walker with ambulation.  Tonight the fall occurred after he stood up from a seated position and started walking.  He suddenly felt that he was lightheaded and fell.  He hit the left side of his head against a piece of furniture.  Not on blood thinners.  He hyperextended his left third and " fourth digits in the process.  Having pain at that site.  Otherwise denying any other symptoms at this time such as chest pain, shortness of breath, extremity swelling, seizure, vision, speech difficulty, facial droop.  Fall today occurred around 3 PM.  Patient states that he waited a couple hours before calling his son.  Called his son around 7 PM.  Son advised to come to the ED.  Patient denies amnesia of the fall.  Patient lives alone at home.  He is concerned about being at home given the since the second fall he has had in the past 10 days.  Patient denies feeling particularly weak recently.  He is confused why he is falling suddenly.  After the first fall he stopped taking his gabapentin thinking that the gabapentin led to the first fall.  He was taking gabapentin for peripheral neuropathy.  Otherwise he has not had any changes in his blood pressure medications or any other medications.  Currently in the ED patient without any symptoms.  No lightheadedness.  No dizziness.  No chest pain, shortness of breath.        Review of Systems   Musculoskeletal:         Positive left hand pain   Neurological:  Positive for light-headedness. Negative for dizziness.   All other systems reviewed and are negative.          Objective       ED Triage Vitals   Temperature Pulse Blood Pressure Respirations SpO2 Patient Position - Orthostatic VS   01/24/25 2117 01/24/25 2117 01/24/25 2117 01/24/25 2117 01/24/25 2117 01/24/25 2117   97.9 °F (36.6 °C) 101 143/80 18 98 % Sitting      Temp Source Heart Rate Source BP Location FiO2 (%) Pain Score    01/24/25 2117 01/24/25 2117 01/24/25 2117 -- 01/25/25 0503    Oral Monitor Right arm  No Pain      Vitals      Date and Time Temp Pulse SpO2 Resp BP Pain Score FACES Pain Rating User   01/26/25 0321 98.5 °F (36.9 °C) 68 94 % 17 122/69 -- -- JA   01/25/25 2350 98.1 °F (36.7 °C) 68 96 % 17 129/69 -- -- JA   01/25/25 2000 -- -- -- -- -- No Pain -- BT   01/25/25 1951 98.8 °F (37.1 °C) 72 96 %  18 126/85 -- -- MW   01/25/25 1416 -- -- -- -- -- No Pain -- GG   01/25/25 1300 -- 61 94 % 16 122/58 -- -- CO   01/25/25 1151 -- 105 -- 20 138/63 -- -- HI   01/25/25 1148 -- 98 -- 21 136/63 -- -- HI   01/25/25 1146 -- 87 -- -- 133/61 -- -- HI   01/25/25 1145 -- 73 -- 19 129/58 -- -- HI   01/25/25 1100 -- 75 96 % 22 120/56 -- -- CO   01/25/25 0945 -- 74 97 % 22 135/62 -- -- CO   01/25/25 0900 -- 68 95 % 20 122/58 -- -- CO   01/25/25 0800 -- 74 98 % 20 109/59 No Pain -- CO   01/25/25 0748 -- 108 -- 21 135/60 -- -- CO   01/25/25 0745 -- 101 -- 20 129/56 -- -- CO   01/25/25 0743 -- -- -- 16 139/59 -- -- CO   01/25/25 0740 -- 72 -- 12 125/60 -- -- CO   01/25/25 0600 -- 75 98 % 14 127/59 -- -- AA   01/25/25 0503 -- -- -- -- -- No Pain -- AA   01/25/25 0500 -- 78 98 % 20 135/60 -- -- AA   01/25/25 0400 97.8 °F (36.6 °C) 75 99 % 18 126/59 -- -- AA   01/25/25 0300 -- 84 98 % 14 117/54 -- -- AA   01/25/25 0200 -- 80 97 % 21 135/60 -- -- AA   01/25/25 0145 -- 81 98 % 16 127/62 -- -- AA   01/24/25 2345 -- 79 97 % 18 138/61 -- -- AA   01/24/25 2245 -- 79 98 % 20 144/88 -- -- AA   01/24/25 2117 97.9 °F (36.6 °C) 101 98 % 18 143/80 -- -- JR            Physical Exam  Vitals and nursing note reviewed.   Constitutional:       General: He is not in acute distress.     Appearance: Normal appearance. He is well-developed. He is not ill-appearing, toxic-appearing or diaphoretic.   HENT:      Head: Normocephalic. Abrasion present. No raccoon eyes, Hough's sign, right periorbital erythema, left periorbital erythema or laceration.     Eyes:      General: No scleral icterus.        Right eye: No discharge.         Left eye: No discharge.      Extraocular Movements: Extraocular movements intact.      Conjunctiva/sclera: Conjunctivae normal.      Pupils: Pupils are equal, round, and reactive to light.   Cardiovascular:      Rate and Rhythm: Normal rate and regular rhythm.      Pulses: Normal pulses.      Heart sounds: Normal heart sounds. No  murmur heard.     No friction rub. No gallop.   Pulmonary:      Effort: Pulmonary effort is normal. No respiratory distress.      Breath sounds: Normal breath sounds. No stridor. No wheezing, rhonchi or rales.   Abdominal:      General: There is no distension.      Palpations: Abdomen is soft. There is no mass.      Tenderness: There is no abdominal tenderness. There is no guarding or rebound.   Musculoskeletal:         General: No swelling.      Left hand: Tenderness and bony tenderness present.        Hands:       Cervical back: Normal range of motion and neck supple.      Right lower leg: No edema.      Left lower leg: No edema.      Comments: Tender to palpation of left third and fourth MCP, DIP joint.  No open wounds seen.  Brisk capillary refill.  2+ radial pulse.    Skin:     General: Skin is warm and dry.      Capillary Refill: Capillary refill takes less than 2 seconds.   Neurological:      General: No focal deficit present.      Mental Status: He is alert and oriented to person, place, and time. Mental status is at baseline.      Cranial Nerves: No cranial nerve deficit.      Sensory: No sensory deficit.      Motor: No weakness.      Coordination: Coordination normal.   Psychiatric:         Mood and Affect: Mood normal.         Thought Content: Thought content normal.         Results Reviewed       Procedure Component Value Units Date/Time    Vitamin B12 [[id number]]  (Abnormal) Collected: 01/25/25 0730    Lab Status: Final result Specimen: Blood from Arm, Right Updated: 01/25/25 1240     Vitamin B-12 947 pg/mL     Hemoglobin A1C [[id number]] Collected: 01/25/25 0730    Lab Status: Final result Specimen: Blood from Arm, Right Updated: 01/25/25 1225     Hemoglobin A1C 5.5 %       mg/dl     TSH, 3rd generation with Free T4 reflex [[id number]]  (Normal) Collected: 01/25/25 0730    Lab Status: Final result Specimen: Blood from Arm, Right Updated: 01/25/25 0811     TSH 3RD GENERATON 2.497 uIU/mL     Lipid  panel [[id number]] Collected: 01/25/25 0730    Lab Status: Final result Specimen: Blood from Arm, Right Updated: 01/25/25 0756     Cholesterol 116 mg/dL      Triglycerides 73 mg/dL      HDL, Direct 43 mg/dL      LDL Calculated 58 mg/dL      Non-HDL-Chol (CHOL-HDL) 73 mg/dl     Basic metabolic panel [[id number]]  (Abnormal) Collected: 01/25/25 0730    Lab Status: Final result Specimen: Blood from Arm, Right Updated: 01/25/25 0755     Sodium 144 mmol/L      Potassium 3.9 mmol/L      Chloride 107 mmol/L      CO2 30 mmol/L      ANION GAP 7 mmol/L      BUN 35 mg/dL      Creatinine 0.95 mg/dL      Glucose 95 mg/dL      Glucose, Fasting 95 mg/dL      Calcium 9.3 mg/dL      eGFR 78 ml/min/1.73sq m     Narrative:      National Kidney Disease Foundation guidelines for Chronic Kidney Disease (CKD):     Stage 1 with normal or high GFR (GFR > 90 mL/min/1.73 square meters)    Stage 2 Mild CKD (GFR = 60-89 mL/min/1.73 square meters)    Stage 3A Moderate CKD (GFR = 45-59 mL/min/1.73 square meters)    Stage 3B Moderate CKD (GFR = 30-44 mL/min/1.73 square meters)    Stage 4 Severe CKD (GFR = 15-29 mL/min/1.73 square meters)    Stage 5 End Stage CKD (GFR <15 mL/min/1.73 square meters)  Note: GFR calculation is accurate only with a steady state creatinine    Magnesium [[id number]]  (Normal) Collected: 01/25/25 0730    Lab Status: Final result Specimen: Blood from Arm, Right Updated: 01/25/25 0755     Magnesium 2.0 mg/dL     CBC and differential [[id number]] Collected: 01/25/25 0730    Lab Status: Final result Specimen: Blood from Arm, Right Updated: 01/25/25 0738     WBC 7.99 Thousand/uL      RBC 4.03 Million/uL      Hemoglobin 12.7 g/dL      Hematocrit 38.4 %      MCV 95 fL      MCH 31.5 pg      MCHC 33.1 g/dL      RDW 13.5 %      MPV 10.1 fL      Platelets 151 Thousands/uL      nRBC 0 /100 WBCs      Segmented % 63 %      Immature Grans % 0 %      Lymphocytes % 23 %      Monocytes % 10 %      Eosinophils Relative 4 %      Basophils  Relative 0 %      Absolute Neutrophils 5.01 Thousands/µL      Absolute Immature Grans 0.02 Thousand/uL      Absolute Lymphocytes 1.86 Thousands/µL      Absolute Monocytes 0.76 Thousand/µL      Eosinophils Absolute 0.32 Thousand/µL      Basophils Absolute 0.02 Thousands/µL     HS Troponin I 2hr [[id number]]  (Normal) Collected: 01/25/25 0105    Lab Status: Final result Specimen: Blood from Arm, Right Updated: 01/25/25 0132     hs TnI 2hr 7 ng/L      Delta 2hr hsTnI 2 ng/L     CBC and differential [[id number]] Collected: 01/24/25 2319    Lab Status: Final result Specimen: Blood from Arm, Left Updated: 01/24/25 2323     WBC 9.56 Thousand/uL      RBC 4.44 Million/uL      Hemoglobin 14.0 g/dL      Hematocrit 42.9 %      MCV 97 fL      MCH 31.5 pg      MCHC 32.6 g/dL      RDW 13.6 %      MPV 10.9 fL      Platelets 169 Thousands/uL      nRBC 0 /100 WBCs      Segmented % 71 %      Immature Grans % 0 %      Lymphocytes % 18 %      Monocytes % 8 %      Eosinophils Relative 3 %      Basophils Relative 0 %      Absolute Neutrophils 6.67 Thousands/µL      Absolute Immature Grans 0.02 Thousand/uL      Absolute Lymphocytes 1.75 Thousands/µL      Absolute Monocytes 0.79 Thousand/µL      Eosinophils Absolute 0.29 Thousand/µL      Basophils Absolute 0.04 Thousands/µL     HS Troponin 0hr (reflex protocol) [[id number]]  (Normal) Collected: 01/24/25 2257    Lab Status: Final result Specimen: Blood from Arm, Left Updated: 01/24/25 2323     hs TnI 0hr 5 ng/L     Comprehensive metabolic panel [[id number]]  (Abnormal) Collected: 01/24/25 2257    Lab Status: Final result Specimen: Blood from Arm, Left Updated: 01/24/25 2318     Sodium 143 mmol/L      Potassium 4.9 mmol/L      Chloride 106 mmol/L      CO2 27 mmol/L      ANION GAP 10 mmol/L      BUN 33 mg/dL      Creatinine 1.04 mg/dL      Glucose 107 mg/dL      Calcium 9.7 mg/dL      AST 26 U/L      ALT 11 U/L      Alkaline Phosphatase 107 U/L      Total Protein 7.3 g/dL      Albumin 4.3 g/dL       Total Bilirubin 1.21 mg/dL      eGFR 70 ml/min/1.73sq m     Narrative:      Specimen is moderately hemolyzed, results may be affected  National Kidney Disease Foundation guidelines for Chronic Kidney Disease (CKD):     Stage 1 with normal or high GFR (GFR > 90 mL/min/1.73 square meters)    Stage 2 Mild CKD (GFR = 60-89 mL/min/1.73 square meters)    Stage 3A Moderate CKD (GFR = 45-59 mL/min/1.73 square meters)    Stage 3B Moderate CKD (GFR = 30-44 mL/min/1.73 square meters)    Stage 4 Severe CKD (GFR = 15-29 mL/min/1.73 square meters)    Stage 5 End Stage CKD (GFR <15 mL/min/1.73 square meters)  Note: GFR calculation is accurate only with a steady state creatinine            XR hand 3+ views LEFT   ED Interpretation by Roque Foreman PA-C (01/25 2406)   No fracture on personal interpretation.      Final Interpretation by Sharri Manley MD (01/25 8177)      Small avulsion fracture at the volar base of the third middle phalanx      A smaller well-corticated bony density seen at the volar base of the fourth middle phalanx may be chronic calcification alternatively nondisplaced volar plate avulsion could've similar appearance in appropriate clinical setting      Computerized Assisted Algorithm (CAA) may have been used to analyze all applicable images.         Resident: Lennox Howe I, the attending radiologist, have reviewed the images and agree with the final report above.      Workstation performed: [id number]         CT head without contrast   Final Interpretation by Tor Machuca MD (01/25 0043)      No acute intracranial hemorrhage, midline shift, or mass effect. Redemonstrated periventricular white matter abnormality/calcification likely due to prior injury/insult.                  Workstation performed: [id number]             ECG 12 Lead Documentation Only    Date/Time: 1/24/2025 9:21 PM    Performed by: Roque Foreman PA-C  Authorized by: Roque Foreman PA-C    Patient location:   ED  Previous ECG:     Previous ECG:  Compared to current    Similarity:  Changes noted  Interpretation:     Interpretation: non-specific    Rate:     ECG rate:  80    ECG rate assessment: normal    Rhythm:     Rhythm: sinus rhythm    Ectopy:     Ectopy: none    QRS:     QRS axis:  Normal    QRS intervals:  Normal  Conduction:     Conduction: normal    ST segments:     ST segments:  Non-specific  T waves:     T waves: normal        ED Medication and Procedure Management   Prior to Admission Medications   Prescriptions Last Dose Informant Patient Reported? Taking?   Acetaminophen (TYLENOL ARTHRITIS PAIN PO)  Self Yes Yes   Sig: Take by mouth daily   Cholecalciferol (VITAMIN D-3) 5000 units TABS 1/25/2025 Self Yes Yes   Sig: take 1 tablet daily   amoxicillin (AMOXIL) 500 mg capsule  Self Yes Yes   Sig: Only dental work   aspirin (ECOTRIN LOW STRENGTH) 81 mg EC tablet 1/25/2025 Self Yes Yes   Sig: Take 81 mg by mouth daily   atorvastatin (LIPITOR) 40 mg tablet   No Yes   Sig: TAKE ONE TABLET BY MOUTH ONCE DAILY   cyanocobalamin (VITAMIN B-12) 1000 MCG tablet 1/25/2025 Self Yes Yes   Sig: Take 1,000 mcg by mouth daily   famotidine (PEPCID) 40 MG tablet 1/25/2025 Self Yes Yes   Sig: Take 40 mg by mouth daily   gabapentin (NEURONTIN) 100 mg capsule Not Taking  No No   Sig: TAKE ONE CAPSULE BY MOUTH 4 TIMES A DAY.   Patient not taking: Reported on 1/24/2025   lisinopril (ZESTRIL) 40 mg tablet 1/25/2025  No Yes   Sig: TAKE ONE TABLET BY MOUTH ONCE DAILY   magnesium 30 MG tablet 1/24/2025  Yes Yes   Sig: Take 250 mg by mouth in the morning   nystatin-triamcinolone (MYCOLOG-II) ointment   No Yes   Sig: Apply topically 2 (two) times a day   oxyCODONE-acetaminophen (PERCOCET) 5-325 mg per tablet Past Week  No Yes   Sig: Take 1 tablet by mouth every 8 (eight) hours as needed for moderate pain Max Daily Amount: 3 tablets   pantoprazole (PROTONIX) 40 mg tablet Past Week  No Yes   Sig: TAKE 1 TABLET BY MOUTH EVERY DAY IN THE MORNING  BEFORE BREAKFAST.   potassium chloride (KLOR-CON) 8 MEQ tablet   No Yes   Sig: TAKE 1 TABLET BY MOUTH EVERY DAY IN THE MORNING.      Facility-Administered Medications: None     Current Discharge Medication List        CONTINUE these medications which have NOT CHANGED    Details   Acetaminophen (TYLENOL ARTHRITIS PAIN PO) Take by mouth daily      amoxicillin (AMOXIL) 500 mg capsule Only dental work      aspirin (ECOTRIN LOW STRENGTH) 81 mg EC tablet Take 81 mg by mouth daily      atorvastatin (LIPITOR) 40 mg tablet TAKE ONE TABLET BY MOUTH ONCE DAILY  Qty: 90 tablet, Refills: 1    Associated Diagnoses: Hyperlipidemia, unspecified hyperlipidemia type      Cholecalciferol (VITAMIN D-3) 5000 units TABS take 1 tablet daily      cyanocobalamin (VITAMIN B-12) 1000 MCG tablet Take 1,000 mcg by mouth daily      famotidine (PEPCID) 40 MG tablet Take 40 mg by mouth daily      lisinopril (ZESTRIL) 40 mg tablet TAKE ONE TABLET BY MOUTH ONCE DAILY  Qty: 90 tablet, Refills: 1    Associated Diagnoses: Hyperlipidemia, unspecified hyperlipidemia type      magnesium 30 MG tablet Take 250 mg by mouth in the morning      nystatin-triamcinolone (MYCOLOG-II) ointment Apply topically 2 (two) times a day  Qty: 30 g, Refills: 2    Associated Diagnoses: Itching of male genitalia      oxyCODONE-acetaminophen (PERCOCET) 5-325 mg per tablet Take 1 tablet by mouth every 8 (eight) hours as needed for moderate pain Max Daily Amount: 3 tablets  Qty: 60 tablet, Refills: 0    Associated Diagnoses: Neuropathy      pantoprazole (PROTONIX) 40 mg tablet TAKE 1 TABLET BY MOUTH EVERY DAY IN THE MORNING BEFORE BREAKFAST.  Qty: 90 tablet, Refills: 1    Associated Diagnoses: Gastroesophageal reflux disease without esophagitis      potassium chloride (KLOR-CON) 8 MEQ tablet TAKE 1 TABLET BY MOUTH EVERY DAY IN THE MORNING.  Qty: 90 tablet, Refills: 0    Associated Diagnoses: Hypokalemia      gabapentin (NEURONTIN) 100 mg capsule TAKE ONE CAPSULE BY MOUTH 4 TIMES  A DAY.  Qty: 360 capsule, Refills: 0    Associated Diagnoses: Neuropathy           No discharge procedures on file.  ED SEPSIS DOCUMENTATION   Time reflects when diagnosis was documented in both MDM as applicable and the Disposition within this note       Time User Action Codes Description Comment    1/25/2025  2:51 AM Roque Foreman Add [W19.XXXA] Fall, initial encounter     1/25/2025  2:51 AM Roque Foreman Add [R55] Near syncope     1/25/2025  2:51 AM Roque Foreman Add [R42] Lightheadedness     1/25/2025  6:44 AM Geraldo Vargas Add [R55] Syncope and collapse     1/25/2025  5:11 PM Juan Cordero Add [S62.629A] Avulsion fracture of middle phalanx of finger                  Roque Foreman PA-C  01/26/25 0635

## 2025-01-25 NOTE — ED NOTES
Breakfast tray order at this time.      Alcira Flowers RN  01/25/25 0808    
Meal tray ordered at this time, to be delivered to inpatient room.      Joanna Carballo RN  01/25/25 5669    
No

## 2025-01-26 ENCOUNTER — APPOINTMENT (INPATIENT)
Dept: NON INVASIVE DIAGNOSTICS | Facility: HOSPITAL | Age: 75
End: 2025-01-26
Payer: MEDICARE

## 2025-01-26 LAB
ANION GAP SERPL CALCULATED.3IONS-SCNC: 8 MMOL/L (ref 4–13)
AORTIC ROOT: 2.9 CM
AORTIC VALVE MEAN VELOCITY: 10.1 M/S
ASCENDING AORTA: 2.9 CM
AV AREA BY CONTINUOUS VTI: 3.3 CM2
AV AREA PEAK VELOCITY: 3.2 CM2
AV LVOT MEAN GRADIENT: 3 MMHG
AV LVOT PEAK GRADIENT: 5 MMHG
AV MEAN PRESS GRAD SYS DOP V1V2: 5 MMHG
AV ORIFICE AREA US: 3.28 CM2
AV PEAK GRADIENT: 9 MMHG
AV VELOCITY RATIO: 0.79
AV VMAX SYS DOP: 1.5 M/S
BSA FOR ECHO PROCEDURE: 2.04 M2
BUN SERPL-MCNC: 32 MG/DL (ref 5–25)
CALCIUM SERPL-MCNC: 8.9 MG/DL (ref 8.4–10.2)
CHLORIDE SERPL-SCNC: 108 MMOL/L (ref 96–108)
CO2 SERPL-SCNC: 25 MMOL/L (ref 21–32)
CREAT SERPL-MCNC: 0.98 MG/DL (ref 0.6–1.3)
DOP CALC AO VTI: 31.81 CM
DOP CALC LVOT AREA: 4.15 CM2
DOP CALC LVOT CARDIAC INDEX: 3.56 L/MIN/M2
DOP CALC LVOT CARDIAC OUTPUT: 7.29 L/MIN
DOP CALC LVOT DIAMETER: 2.3 CM
DOP CALC LVOT PEAK VEL VTI: 25.14 CM
DOP CALC LVOT PEAK VEL: 1.14 M/S
DOP CALC LVOT STROKE INDEX: 50.7 ML/M2
DOP CALC LVOT STROKE VOLUME: 104.4
E WAVE DECELERATION TIME: 177 MS
E/A RATIO: 0.87
ERYTHROCYTE [DISTWIDTH] IN BLOOD BY AUTOMATED COUNT: 13.6 % (ref 11.6–15.1)
FRACTIONAL SHORTENING: 33 (ref 28–44)
GFR SERPL CREATININE-BSD FRML MDRD: 75 ML/MIN/1.73SQ M
GLUCOSE SERPL-MCNC: 103 MG/DL (ref 65–140)
GLUCOSE SERPL-MCNC: 104 MG/DL (ref 65–140)
HCT VFR BLD AUTO: 40.6 % (ref 36.5–49.3)
HGB BLD-MCNC: 13.2 G/DL (ref 12–17)
INTERVENTRICULAR SEPTUM IN DIASTOLE (PARASTERNAL SHORT AXIS VIEW): 1.1 CM
INTERVENTRICULAR SEPTUM: 1.1 CM (ref 0.6–1.1)
LAAS-AP2: 18.9 CM2
LAAS-AP4: 17.1 CM2
LEFT ATRIUM SIZE: 3.5 CM
LEFT ATRIUM VOLUME (MOD BIPLANE): 49 ML
LEFT ATRIUM VOLUME INDEX (MOD BIPLANE): 23.9 ML/M2
LEFT INTERNAL DIMENSION IN SYSTOLE: 2.7 CM (ref 2.1–4)
LEFT VENTRICULAR INTERNAL DIMENSION IN DIASTOLE: 4 CM (ref 3.5–6)
LEFT VENTRICULAR POSTERIOR WALL IN END DIASTOLE: 1.1 CM
LEFT VENTRICULAR STROKE VOLUME: 42 ML
LV EF US.2D.A4C+ESTIMATED: 63 %
LVSV (TEICH): 42 ML
MCH RBC QN AUTO: 31.5 PG (ref 26.8–34.3)
MCHC RBC AUTO-ENTMCNC: 32.5 G/DL (ref 31.4–37.4)
MCV RBC AUTO: 97 FL (ref 82–98)
MV E'TISSUE VEL-SEP: 11 CM/S
MV PEAK A VEL: 0.86 M/S
MV PEAK E VEL: 75 CM/S
MV STENOSIS PRESSURE HALF TIME: 51 MS
MV VALVE AREA P 1/2 METHOD: 4.31
PLATELET # BLD AUTO: 152 THOUSANDS/UL (ref 149–390)
PMV BLD AUTO: 10.3 FL (ref 8.9–12.7)
POTASSIUM SERPL-SCNC: 4 MMOL/L (ref 3.5–5.3)
RBC # BLD AUTO: 4.19 MILLION/UL (ref 3.88–5.62)
RIGHT ATRIUM AREA SYSTOLE A4C: 14 CM2
RIGHT VENTRICLE ID DIMENSION: 2.7 CM
SL CV LEFT ATRIUM LENGTH A2C: 5.4 CM
SL CV LV EF: 68
SL CV PED ECHO LEFT VENTRICLE DIASTOLIC VOLUME (MOD BIPLANE) 2D: 69 ML
SL CV PED ECHO LEFT VENTRICLE SYSTOLIC VOLUME (MOD BIPLANE) 2D: 26 ML
SODIUM SERPL-SCNC: 141 MMOL/L (ref 135–147)
TR MAX PG: 19 MMHG
TR PEAK VELOCITY: 2.2 M/S
TRICUSPID ANNULAR PLANE SYSTOLIC EXCURSION: 2.1 CM
TRICUSPID VALVE PEAK REGURGITATION VELOCITY: 2.18 M/S
WBC # BLD AUTO: 8.15 THOUSAND/UL (ref 4.31–10.16)

## 2025-01-26 PROCEDURE — 82948 REAGENT STRIP/BLOOD GLUCOSE: CPT

## 2025-01-26 PROCEDURE — 80048 BASIC METABOLIC PNL TOTAL CA: CPT | Performed by: INTERNAL MEDICINE

## 2025-01-26 PROCEDURE — 93306 TTE W/DOPPLER COMPLETE: CPT | Performed by: INTERNAL MEDICINE

## 2025-01-26 PROCEDURE — 99222 1ST HOSP IP/OBS MODERATE 55: CPT | Performed by: ORTHOPAEDIC SURGERY

## 2025-01-26 PROCEDURE — 99232 SBSQ HOSP IP/OBS MODERATE 35: CPT | Performed by: INTERNAL MEDICINE

## 2025-01-26 PROCEDURE — 85027 COMPLETE CBC AUTOMATED: CPT | Performed by: INTERNAL MEDICINE

## 2025-01-26 PROCEDURE — 93306 TTE W/DOPPLER COMPLETE: CPT

## 2025-01-26 RX ORDER — SODIUM CHLORIDE 9 MG/ML
75 INJECTION, SOLUTION INTRAVENOUS CONTINUOUS
Status: DISCONTINUED | OUTPATIENT
Start: 2025-01-26 | End: 2025-01-26

## 2025-01-26 RX ORDER — ATORVASTATIN CALCIUM 40 MG/1
40 TABLET, FILM COATED ORAL
Status: DISCONTINUED | OUTPATIENT
Start: 2025-01-26 | End: 2025-01-28 | Stop reason: HOSPADM

## 2025-01-26 RX ORDER — SODIUM CHLORIDE 9 MG/ML
75 INJECTION, SOLUTION INTRAVENOUS CONTINUOUS
Status: DISPENSED | OUTPATIENT
Start: 2025-01-26 | End: 2025-01-27

## 2025-01-26 RX ADMIN — LISINOPRIL 40 MG: 20 TABLET ORAL at 09:22

## 2025-01-26 RX ADMIN — ASPIRIN 81 MG: 81 TABLET, COATED ORAL at 09:22

## 2025-01-26 RX ADMIN — ATORVASTATIN CALCIUM 40 MG: 40 TABLET, FILM COATED ORAL at 17:22

## 2025-01-26 RX ADMIN — PANTOPRAZOLE SODIUM 40 MG: 40 TABLET, DELAYED RELEASE ORAL at 06:03

## 2025-01-26 RX ADMIN — FAMOTIDINE 40 MG: 20 TABLET, FILM COATED ORAL at 09:21

## 2025-01-26 RX ADMIN — ENOXAPARIN SODIUM 40 MG: 40 INJECTION SUBCUTANEOUS at 09:21

## 2025-01-26 RX ADMIN — SODIUM CHLORIDE 75 ML/HR: 0.9 INJECTION, SOLUTION INTRAVENOUS at 17:22

## 2025-01-26 NOTE — PROGRESS NOTES
Cardiology Progress Note   MD David Ayers MD, [facility]  Hollis Montague DO, [facility]  MD Debi Gamble DO, [facility]  Navin Armando DO, [facility]  ----------------------------------------------------------------  [address] [address]    Abhinav Sierra 74 y.o. male MRN: [id number]  Unit/Bed#: E4 -01 Encounter: [id number]      ASSESSMENT:   Syncope and collapse likely orthostatic  Hypertension  LVEF 68%, mild LVH, grade 1 diastolic dysfunction, AV sclerosis, trace TR with PASP 22 mmHg, January 2025  Dyslipidemia  Prediabetes  GERD    PLAN:  Telemetry unremarkable overnight  Echocardiogram demonstrated normal left ventricular systolic function without significant wall motion abnormality or valvular disease.  Encouraged the patient to stay well-hydrated drinking 2 to 2.5 L of water on a daily basis  Avoid dehydrating liquids including coffee, tea and alcohol  Continue lisinopril and statin  Would provide compression stockings  If echocardiogram and orthostatic vital signs are unremarkable, reasonable for discharge later today  Follow-up in office for event recorder following discharge    Signed: Hollis Montague DO, [facility], CONNER CARTER, FACP      History of Present Illness:  Patient seen and examined.  Denies chest pain, pressure, tightness or squeezing.  Denies lightheadedness, dizziness or palpitations.  Denies lower extreme swelling, orthopnea or paroxysmal nocturnal dyspnea.      Review of Systems:  Review of Systems   Constitutional: Negative for decreased appetite, fever, weight gain and weight loss.   HENT:  Negative for congestion and sore throat.    Eyes:  Negative for visual disturbance.   Cardiovascular:  Negative for chest pain, dyspnea on exertion, leg swelling, near-syncope and palpitations.   Respiratory:  Negative for cough and shortness of breath.    Hematologic/Lymphatic: Negative for bleeding problem.   Skin:  Negative for rash.    Musculoskeletal:  Negative for myalgias and neck pain.   Gastrointestinal:  Negative for abdominal pain and nausea.   Neurological:  Negative for light-headedness and weakness.   Psychiatric/Behavioral:  Negative for depression.        Allergies   Allergen Reactions    Ciprofloxacin GI Intolerance    Cephalexin     Oxybutynin GI Intolerance    Tiotropium Bromide Monohydrate GI Intolerance       No current facility-administered medications on file prior to encounter.     Current Outpatient Medications on File Prior to Encounter   Medication Sig    Acetaminophen (TYLENOL ARTHRITIS PAIN PO) Take by mouth daily    amoxicillin (AMOXIL) 500 mg capsule Only dental work    aspirin (ECOTRIN LOW STRENGTH) 81 mg EC tablet Take 81 mg by mouth daily    atorvastatin (LIPITOR) 40 mg tablet TAKE ONE TABLET BY MOUTH ONCE DAILY    Cholecalciferol (VITAMIN D-3) 5000 units TABS take 1 tablet daily    cyanocobalamin (VITAMIN B-12) 1000 MCG tablet Take 1,000 mcg by mouth daily    famotidine (PEPCID) 40 MG tablet Take 40 mg by mouth daily    lisinopril (ZESTRIL) 40 mg tablet TAKE ONE TABLET BY MOUTH ONCE DAILY    magnesium 30 MG tablet Take 250 mg by mouth in the morning    nystatin-triamcinolone (MYCOLOG-II) ointment Apply topically 2 (two) times a day    oxyCODONE-acetaminophen (PERCOCET) 5-325 mg per tablet Take 1 tablet by mouth every 8 (eight) hours as needed for moderate pain Max Daily Amount: 3 tablets    pantoprazole (PROTONIX) 40 mg tablet TAKE 1 TABLET BY MOUTH EVERY DAY IN THE MORNING BEFORE BREAKFAST.    potassium chloride (KLOR-CON) 8 MEQ tablet TAKE 1 TABLET BY MOUTH EVERY DAY IN THE MORNING.    gabapentin (NEURONTIN) 100 mg capsule TAKE ONE CAPSULE BY MOUTH 4 TIMES A DAY. (Patient not taking: Reported on 1/24/2025)        Current Facility-Administered Medications   Medication Dose Route Frequency Provider Last Rate    acetaminophen  650 mg Oral Q6H PRN Geraldo Vargas PA-C      aspirin  81 mg Oral Daily Geraldo Painter  Alicia, SHANELLE      enoxaparin  40 mg Subcutaneous Daily Geraldo Vargas PA-C      famotidine  40 mg Oral Daily Geraldo Vargas PA-C      lisinopril  40 mg Oral Daily Geraldo Vargas, SHANELLE      pantoprazole  40 mg Oral Early Morning Geraldo Vargas PA-C      sodium chloride  75 mL/hr Intravenous Continuous Geraldo Vargas PA-C 75 mL/hr (01/25/25 0736)       sodium chloride, 75 mL/hr, Last Rate: 75 mL/hr (01/25/25 0736)        Vitals:    01/25/25 1951 01/25/25 2350 01/26/25 0321 01/26/25 0600   BP: 126/85 129/69 122/69    BP Location: Left arm Left arm Left arm    Pulse: 72 68 68    Resp: 18 17 17    Temp: 98.8 °F (37.1 °C) 98.1 °F (36.7 °C) 98.5 °F (36.9 °C)    TempSrc: Temporal Temporal Temporal    SpO2: 96% 96% 94%    Weight:    86.5 kg (190 lb 11.2 oz)   Height:         Body mass index is 27.36 kg/m².      Intake/Output Summary (Last 24 hours) at 1/26/2025 0719  Last data filed at 1/26/2025 0329  Gross per 24 hour   Intake --   Output 375 ml   Net -375 ml       Weight change: -2 kg (-4 lb 6.5 oz)    PHYSICAL EXAMINATION:  Gen: Awake, Alert, NAD  Head/eyes: AT/NC, pupils equal and round, Anicteric  ENT: mmm  Neck: Supple, No elevated JVP, trachea midline  Resp: CTA bilaterally no w/r/r  CV: RRR +S1, S2, No m/r/g  Abd: Soft, NT/ND + BS  Ext: no LE edema bilaterally  Neuro: Follows commands, moves all extermities  Psych: Appropriate affect, normal mood, pleasant attitude, non-combative  Skin: warm; no rash, erythema or venous stasis changes on exposed skin    Lab Results:  Results from last 7 days   Lab Units 01/26/25  0606   WBC Thousand/uL 8.15   HEMOGLOBIN g/dL 13.2   HEMATOCRIT % 40.6   PLATELETS Thousands/uL 152     Results from last 7 days   Lab Units 01/25/25  0730 01/24/25  2257   POTASSIUM mmol/L 3.9 4.9   CHLORIDE mmol/L 107 106   CO2 mmol/L 30 27   BUN mg/dL 35* 33*   CREATININE mg/dL 0.95 1.04   CALCIUM mg/dL 9.3 9.7   ALK PHOS U/L  --  107*   ALT U/L  --  11   AST U/L  --  26     No results found  "for: \"TROPONINT\"      Results from last 7 days   Lab Units 01/25/25  0105 01/24/25  2257   HS TNI 0HR ng/L  --  5   HS TNI 2HR ng/L 7  --            Tele: SR    This note was completed in part utilizing M-Modal Fluency Direct Software.  Grammatical errors, random word insertions, spelling mistakes, and incomplete sentences may be an occasional consequence of this system secondary to software limitations, ambient noise, and hardware issues.  If you have any questions or concerns about the content, text, or information contained within the body of this dictation, please contact the provider for clarification.      "

## 2025-01-26 NOTE — ASSESSMENT & PLAN NOTE
History of hypertension hyperlipidemia peripheral neuropathy who presented to the hospital with recurrent syncope  Cardiac workup thus far negative.  No events on telemetry.  Echocardiogram without valvular pathology  Orthostatic vitals negative.  Awaiting PT/OT evaluations for discharge needs

## 2025-01-26 NOTE — PLAN OF CARE
Problem: Potential for Falls  Goal: Patient will remain free of falls  Description: INTERVENTIONS:  - Educate patient/family on patient safety including physical limitations  - Instruct patient to call for assistance with activity   - Consult OT/PT to assist with strengthening/mobility   - Keep Call bell within reach  - Keep bed low and locked with side rails adjusted as appropriate  - Keep care items and personal belongings within reach  - Initiate and maintain comfort rounds  - Make Fall Risk Sign visible to staff  - Offer Toileting every 2 Hours, in advance of need  - Apply yellow socks and bracelet for high fall risk patients  - Consider moving patient to room near nurses station  Outcome: Progressing     Problem: PAIN - ADULT  Goal: Verbalizes/displays adequate comfort level or baseline comfort level  Description: Interventions:  - Encourage patient to monitor pain and request assistance  - Assess pain using appropriate pain scale  - Administer analgesics based on type and severity of pain and evaluate response  - Implement non-pharmacological measures as appropriate and evaluate response  - Consider cultural and social influences on pain and pain management  - Notify physician/advanced practitioner if interventions unsuccessful or patient reports new pain  Outcome: Progressing     Problem: SAFETY ADULT  Goal: Patient will remain free of falls  Description: INTERVENTIONS:  - Educate patient/family on patient safety including physical limitations  - Instruct patient to call for assistance with activity   - Consult OT/PT to assist with strengthening/mobility   - Keep Call bell within reach  - Keep bed low and locked with side rails adjusted as appropriate  - Keep care items and personal belongings within reach  - Initiate and maintain comfort rounds  - Make Fall Risk Sign visible to staff  - Offer Toileting every 2 Hours, in advance of need  - Apply yellow socks and bracelet for high fall risk patients  -  Consider moving patient to room near nurses station  Outcome: Progressing  Goal: Maintain or return to baseline ADL function  Description: INTERVENTIONS:  -  Assess patient's ability to carry out ADLs; assess patient's baseline for ADL function and identify physical deficits which impact ability to perform ADLs (bathing, care of mouth/teeth, toileting, grooming, dressing, etc.)  - Assess/evaluate cause of self-care deficits   - Assess range of motion  - Assess patient's mobility; develop plan if impaired  - Assess patient's need for assistive devices and provide as appropriate  - Encourage maximum independence but intervene and supervise when necessary  - Involve family in performance of ADLs  - Assess for home care needs following discharge   - Consider OT consult to assist with ADL evaluation and planning for discharge  - Provide patient education as appropriate  Outcome: Progressing  Goal: Maintains/Returns to pre admission functional level  Description: INTERVENTIONS:  - Perform AM-PAC 6 Click Basic Mobility/ Daily Activity assessment daily.  - Set and communicate daily mobility goal to care team and patient/family/caregiver.   - Collaborate with rehabilitation services on mobility goals if consulted  - Perform Range of Motion  times a day.  - Reposition patient every  hours.  - Dangle patient  times a day  - Stand patient  times a day  - Ambulate patient  times a day  - Out of bed to chair  times a day   - Out of bed for meals  times a day  - Out of bed for toileting  - Record patient progress and toleration of activity level   Outcome: Progressing     Problem: MUSCULOSKELETAL - ADULT  Goal: Maintain or return mobility to safest level of function  Description: INTERVENTIONS:  - Assess patient's ability to carry out ADLs; assess patient's baseline for ADL function and identify physical deficits which impact ability to perform ADLs (bathing, care of mouth/teeth, toileting, grooming, dressing, etc.)  -  Assess/evaluate cause of self-care deficits   - Assess range of motion  - Assess patient's mobility  - Assess patient's need for assistive devices and provide as appropriate  - Encourage maximum independence but intervene and supervise when necessary  - Involve family in performance of ADLs  - Assess for home care needs following discharge   - Consider OT consult to assist with ADL evaluation and planning for discharge  - Provide patient education as appropriate  Outcome: Progressing     Problem: INFECTION - ADULT  Goal: Absence or prevention of progression during hospitalization  Description: INTERVENTIONS:  - Assess and monitor for signs and symptoms of infection  - Monitor lab/diagnostic results  - Monitor all insertion sites, i.e. indwelling lines, tubes, and drains  - Monitor endotracheal if appropriate and nasal secretions for changes in amount and color  - [place] appropriate cooling/warming therapies per order  - Administer medications as ordered  - Instruct and encourage patient and family to use good hand hygiene technique  - Identify and instruct in appropriate isolation precautions for identified infection/condition  Outcome: Progressing  Goal: Absence of fever/infection during neutropenic period  Description: INTERVENTIONS:  - Monitor WBC    Outcome: Progressing     Problem: DISCHARGE PLANNING  Goal: Discharge to home or other facility with appropriate resources  Description: INTERVENTIONS:  - Identify barriers to discharge w/patient and caregiver  - Arrange for needed discharge resources and transportation as appropriate  - Identify discharge learning needs (meds, wound care, etc.)  - Arrange for interpretive services to assist at discharge as needed  - Refer to Case Management Department for coordinating discharge planning if the patient needs post-hospital services based on physician/advanced practitioner order or complex needs related to functional status, cognitive ability, or social support  system  Outcome: Progressing     Problem: Knowledge Deficit  Goal: Patient/family/caregiver demonstrates understanding of disease process, treatment plan, medications, and discharge instructions  Description: Complete learning assessment and assess knowledge base.  Interventions:  - Provide teaching at level of understanding  - Provide teaching via preferred learning methods  Outcome: Progressing

## 2025-01-26 NOTE — ASSESSMENT & PLAN NOTE
No surgical intervention recommended at this time  Discussed finger immobilizer but patient prefers to hold off on this  Range of motion as tolerated of the middle finger  PT/OT as needed  Pain control per primary team  Orthopedics will sign off at this time  Follow-up outpatient with orthopedic hand surgery  Case reviewed and discussed with Dr. Acosta

## 2025-01-26 NOTE — PROGRESS NOTES
Progress Note - Hospitalist   Name: Abhinav Sierra 74 y.o. male I MRN: [id number]  Unit/Bed#: E4 -01 I Date of Admission: 2025   Date of Service: 2025 I Hospital Day: 1    Assessment & Plan  Syncope and collapse  History of hypertension hyperlipidemia peripheral neuropathy who presented to the hospital with recurrent syncope  Cardiac workup thus far negative.  No events on telemetry.  Echocardiogram without valvular pathology  Orthostatic vitals negative.  Awaiting PT/OT evaluations for discharge needs  Benign essential hypertension  Blood pressure stable continue lisinopril  Hyperlipidemia  Continue atorvastatin  Peripheral neuropathy  Follows with [facility] neurology.  Reports no longer taking gabapentin.  Avulsion fracture of middle phalanx of finger  Secondary to fall.  Seen by orthopedics.    VTE Pharmacologic Prophylaxis: VTE Score: 3 Moderate Risk (Score 3-4) - Pharmacological DVT Prophylaxis Ordered: enoxaparin (Lovenox).    Mobility:   Basic Mobility Inpatient Raw Score: 18  JH-HLM Goal: 6: Walk 10 steps or more  JH-HLM Achieved: 6: Walk 10 steps or more  JH-HLM Goal achieved. Continue to encourage appropriate mobility.    Patient Centered Rounds: I have performed bedside rounds with nursing staff today.  Discussions with Specialists or Other Care Team Provider:     Education and Discussions with Family / Patient: Updated  (son) via phone.    Current Length of Stay: 1 day(s)  Current Patient Status: Inpatient   Certification Statement: The patient will continue to require additional inpatient hospital stay due to PT/OT evaluations and possible need for rehab  Discharge Plan: Anticipate discharge in 24-48 hrs to discharge location to be determined pending rehab evaluations.    Code Status: Level 3 - DNAR and DNI    Subjective   Patient seen and examined.  Feeling unsteady.  No chest pain.    Objective   Vitals:   Temp (24hrs), Av.4 °F (36.9 °C), Min:97.7 °F (36.5 °C), Max:99 °F  (37.2 °C)    Temp:  [97.7 °F (36.5 °C)-99 °F (37.2 °C)] 97.7 °F (36.5 °C)  HR:  [67-75] 75  Resp:  [17-18] 18  BP: (119-130)/(56-85) 119/56  SpO2:  [94 %-98 %] 98 %  Body mass index is 27.26 kg/m².     Input and Output Summary (last 24 hours):     Intake/Output Summary (Last 24 hours) at 1/26/2025 1642  Last data filed at 1/26/2025 1200  Gross per 24 hour   Intake 220 ml   Output 925 ml   Net -705 ml       Physical Exam  Vitals reviewed.   Constitutional:       General: He is not in acute distress.  HENT:      Head: Atraumatic.   Cardiovascular:      Rate and Rhythm: Regular rhythm.   Pulmonary:      Effort: Pulmonary effort is normal.      Breath sounds: No wheezing.   Abdominal:      General: Bowel sounds are normal.      Palpations: Abdomen is soft.      Tenderness: There is no abdominal tenderness.   Musculoskeletal:         General: No swelling.   Skin:     General: Skin is warm and dry.   Neurological:      General: No focal deficit present.      Mental Status: He is alert.      Cranial Nerves: No cranial nerve deficit.   Psychiatric:         Mood and Affect: Mood normal.       Lines/Drains:  Invasive Devices       Peripheral Intravenous Line  Duration             Peripheral IV 01/25/25 Dorsal (posterior);Right Wrist 1 day    Peripheral IV 01/25/25 Right;Ventral (anterior) Forearm 1 day                            Lab Results: I have reviewed the following results:   Results from last 7 days   Lab Units 01/26/25  0606 01/25/25  0730 01/24/25  2319   WBC Thousand/uL 8.15 7.99 9.56   HEMOGLOBIN g/dL 13.2 12.7 14.0   PLATELETS Thousands/uL 152 151 169   MCV fL 97 95 97     Results from last 7 days   Lab Units 01/26/25  0606 01/25/25  0730 01/24/25  2257   SODIUM mmol/L 141 144 143   POTASSIUM mmol/L 4.0 3.9 4.9   CHLORIDE mmol/L 108 107 106   CO2 mmol/L 25 30 27   ANION GAP mmol/L 8 7 10   BUN mg/dL 32* 35* 33*   CREATININE mg/dL 0.98 0.95 1.04   CALCIUM mg/dL 8.9 9.3 9.7   ALBUMIN g/dL  --   --  4.3   TOTAL  BILIRUBIN mg/dL  --   --  1.21*   ALK PHOS U/L  --   --  107*   ALT U/L  --   --  11   AST U/L  --   --  26   EGFR ml/min/1.73sq m 75 78 70   GLUCOSE RANDOM mg/dL 103 95 107     Results from last 7 days   Lab Units 01/25/25  0730   MAGNESIUM mg/dL 2.0         Results from last 7 days   Lab Units 01/25/25  0105 01/24/25  2257   HS TNI 0HR ng/L  --  5   HS TNI 2HR ng/L 7  --               Results from last 7 days   Lab Units 01/26/25  0801   POC GLUCOSE mg/dl 104     Results from last 7 days   Lab Units 01/25/25  0730   HEMOGLOBIN A1C % 5.5     Results from last 7 days   Lab Units 01/25/25  0730   TSH 3RD GENERATON uIU/mL 2.497       Recent Cultures (last 7 days):         Imaging:  Reviewed radiology reports from this admission including:  Echo complete w/ contrast if indicated  Result Date: 1/26/2025  Narrative:   Left Ventricle: Left ventricular cavity size is normal. There is mild concentric hypertrophy. The left ventricular ejection fraction is 68% by visual estimation. Systolic function is normal. Wall motion is normal. Diastolic function is mildly abnormal, consistent with grade I (abnormal) relaxation.   Right Ventricle: Right ventricular cavity size is normal. Systolic function is normal.   Aortic Valve: There is aortic valve sclerosis.The aortic valve mean gradient is 5 mmHg. The dimensionless velocity index is 0.79. The aortic valve area is 3.28 cm2.   Tricuspid Valve: There is trace regurgitation. Pulmonary artery systolic pressures are estimated at 22 mmHg.   There is no study for comparison.       Last 24 Hours Medication List:     Current Facility-Administered Medications:     acetaminophen (TYLENOL) tablet 650 mg, Q6H PRN    aspirin (ECOTRIN LOW STRENGTH) EC tablet 81 mg, Daily    atorvastatin (LIPITOR) tablet 40 mg, Daily With Dinner    enoxaparin (LOVENOX) subcutaneous injection 40 mg, Daily    famotidine (PEPCID) tablet 40 mg, Daily    lisinopril (ZESTRIL) tablet 40 mg, Daily    pantoprazole  (PROTONIX) EC tablet 40 mg, Early Morning    sodium chloride 0.9 % infusion, Continuous, Last Rate: Stopped (01/26/25 0923)    Administrative Statements   Today, Patient Was Seen By: Juan Cordero, DO  I have spent a total time of 35 minutes in caring for this patient on the day of the visit/encounter including Patient and family education, Documenting in the medical record, Reviewing / ordering tests, medicine, procedures  , and Obtaining or reviewing history  .    **Please Note: This note may have been constructed using a voice recognition system.**

## 2025-01-26 NOTE — PLAN OF CARE
Problem: Potential for Falls  Goal: Patient will remain free of falls  Description: INTERVENTIONS:  - Educate patient/family on patient safety including physical limitations  - Instruct patient to call for assistance with activity   - Consult OT/PT to assist with strengthening/mobility   - Keep Call bell within reach  - Keep bed low and locked with side rails adjusted as appropriate  - Keep care items and personal belongings within reach  - Initiate and maintain comfort rounds  - Make Fall Risk Sign visible to staff  - Offer Toileting every 2 Hours, in advance of need  - Apply yellow socks and bracelet for high fall risk patients  - Consider moving patient to room near nurses station  Outcome: Progressing     Problem: PAIN - ADULT  Goal: Verbalizes/displays adequate comfort level or baseline comfort level  Description: Interventions:  - Encourage patient to monitor pain and request assistance  - Assess pain using appropriate pain scale  - Administer analgesics based on type and severity of pain and evaluate response  - Implement non-pharmacological measures as appropriate and evaluate response  - Consider cultural and social influences on pain and pain management  - Notify physician/advanced practitioner if interventions unsuccessful or patient reports new pain  Outcome: Progressing     Problem: SAFETY ADULT  Goal: Patient will remain free of falls  Description: INTERVENTIONS:  - Educate patient/family on patient safety including physical limitations  - Instruct patient to call for assistance with activity   - Consult OT/PT to assist with strengthening/mobility   - Keep Call bell within reach  - Keep bed low and locked with side rails adjusted as appropriate  - Keep care items and personal belongings within reach  - Initiate and maintain comfort rounds  - Make Fall Risk Sign visible to staff  - Offer Toileting every 2 Hours, in advance of need  - Apply yellow socks and bracelet for high fall risk patients  -  Consider moving patient to room near nurses station  Outcome: Progressing  Goal: Maintain or return to baseline ADL function  Description: INTERVENTIONS:  -  Assess patient's ability to carry out ADLs; assess patient's baseline for ADL function and identify physical deficits which impact ability to perform ADLs (bathing, care of mouth/teeth, toileting, grooming, dressing, etc.)  - Assess/evaluate cause of self-care deficits   - Assess range of motion  - Assess patient's mobility; develop plan if impaired  - Assess patient's need for assistive devices and provide as appropriate  - Encourage maximum independence but intervene and supervise when necessary  - Involve family in performance of ADLs  - Assess for home care needs following discharge   - Consider OT consult to assist with ADL evaluation and planning for discharge  - Provide patient education as appropriate  Outcome: Progressing  Goal: Maintains/Returns to pre admission functional level  Description: INTERVENTIONS:  - Perform AM-PAC 6 Click Basic Mobility/ Daily Activity assessment daily.  - Set and communicate daily mobility goal to care team and patient/family/caregiver.   - Collaborate with rehabilitation services on mobility goals if consulted  - Perform Range of Motion  times a day.  - Reposition patient every  hours.  - Dangle patient  times a day  - Stand patient  times a day  - Ambulate patient  times a day  - Out of bed to chair  times a day   - Out of bed for meals  times a day  - Out of bed for toileting  - Record patient progress and toleration of activity level   Outcome: Progressing     Problem: MUSCULOSKELETAL - ADULT  Goal: Maintain or return mobility to safest level of function  Description: INTERVENTIONS:  - Assess patient's ability to carry out ADLs; assess patient's baseline for ADL function and identify physical deficits which impact ability to perform ADLs (bathing, care of mouth/teeth, toileting, grooming, dressing, etc.)  -  Assess/evaluate cause of self-care deficits   - Assess range of motion  - Assess patient's mobility  - Assess patient's need for assistive devices and provide as appropriate  - Encourage maximum independence but intervene and supervise when necessary  - Involve family in performance of ADLs  - Assess for home care needs following discharge   - Consider OT consult to assist with ADL evaluation and planning for discharge  - Provide patient education as appropriate  Outcome: Progressing     Problem: INFECTION - ADULT  Goal: Absence or prevention of progression during hospitalization  Description: INTERVENTIONS:  - Assess and monitor for signs and symptoms of infection  - Monitor lab/diagnostic results  - Monitor all insertion sites, i.e. indwelling lines, tubes, and drains  - Monitor endotracheal if appropriate and nasal secretions for changes in amount and color  - [place] appropriate cooling/warming therapies per order  - Administer medications as ordered  - Instruct and encourage patient and family to use good hand hygiene technique  - Identify and instruct in appropriate isolation precautions for identified infection/condition  Outcome: Progressing  Goal: Absence of fever/infection during neutropenic period  Description: INTERVENTIONS:  - Monitor WBC    Outcome: Progressing     Problem: DISCHARGE PLANNING  Goal: Discharge to home or other facility with appropriate resources  Description: INTERVENTIONS:  - Identify barriers to discharge w/patient and caregiver  - Arrange for needed discharge resources and transportation as appropriate  - Identify discharge learning needs (meds, wound care, etc.)  - Arrange for interpretive services to assist at discharge as needed  - Refer to Case Management Department for coordinating discharge planning if the patient needs post-hospital services based on physician/advanced practitioner order or complex needs related to functional status, cognitive ability, or social support  system  Outcome: Progressing     Problem: Knowledge Deficit  Goal: Patient/family/caregiver demonstrates understanding of disease process, treatment plan, medications, and discharge instructions  Description: Complete learning assessment and assess knowledge base.  Interventions:  - Provide teaching at level of understanding  - Provide teaching via preferred learning methods  Outcome: Progressing

## 2025-01-26 NOTE — CONSULTS
Consultation - Orthopedics   Name: Abhinav Sierra 74 y.o. male I MRN: [id number]  Unit/Bed#: E4 -01 I Date of Admission: 1/24/2025   Date of Service: 1/26/2025 I Hospital Day: 1   Inpatient consult to Orthopedic Surgery  Consult performed by: Atif Blankenship PA-C  Consult ordered by: Juan Cordero DO        Physician Requesting Evaluation: Juan Cordero DO   Reason for Evaluation / Principal Problem: Avulsion fracture of the middle phalanx of left middle finger      Assessment & Plan  Avulsion fracture of middle phalanx of finger  No surgical intervention recommended at this time  Discussed finger immobilizer but patient prefers to hold off on this  Range of motion as tolerated of the middle finger  PT/OT as needed  Pain control per primary team  Orthopedics will sign off at this time  Follow-up outpatient with orthopedic hand surgery  Case reviewed and discussed with Dr. Acosta            History of Present Illness   HPI: Abhinav Sierra is a 74 y.o. year old male who presents with left hand pain.  Patient said 2 days ago he fell and injured the left hand.  Patient describes the pain primarily around the base of the middle phalanx of the third finger.  He also endorses some swelling and difficulty with bending the finger.  He feels like his  strength is weak since the injury.  Patient denies pain prior to the injury, pain in the other fingers, numbness and tingling, and previous injections to the fingers.    Review of Systems significant for findings described in the HPI.      Objective :  Temp:  [98.1 °F (36.7 °C)-98.8 °F (37.1 °C)] 98.5 °F (36.9 °C)  HR:  [] 68  BP: (109-139)/(56-85) 122/69  Resp:  [12-22] 17  SpO2:  [94 %-99 %] 94 %  O2 Device: (P) None (Room air)  Physical ExamOrtho Exam     Orthopedic exam:  Left hand exam:  Inspection of the left hand reveals some mild swelling of the third digit but otherwise no obvious erythema, ecchymosis, or other abnormalities  Tenderness to palpation  "noted at the base of the middle phalanx of the third finger.  No other tenderness to palpation noted throughout fingers/hand  Range of motion of the third digit is limited by stiffness but otherwise range of motion is intact of the left hand and wrist  Neurovascular intact distally        Lab Results: I have reviewed the following results:   Recent Labs     01/24/25  2257 01/24/25 2319 01/24/25  2319 01/25/25  0730 01/26/25  0606   WBC  --  9.56  --  7.99 8.15   HGB  --  14.0  --  12.7 13.2   HCT  --  42.9  --  38.4 40.6   PLT  --  169   < > 151 152   BUN 33*  --   --  35*  --    CREATININE 1.04  --   --  0.95  --     < > = values in this interval not displayed.     Blood Culture: No results found for: \"BLOODCX\"  Wound Culture: No results found for: \"WOUNDCULT\"    Imaging Results Review: I personally reviewed the following image studies in PACS and associated radiology reports: left hand x-rays. My interpretation of the radiology images/reports is: Avulsion fracture at the base of the middle phalanx of the left finger.  Otherwise, no other obvious acute osseous abnormalities.    "

## 2025-01-27 DIAGNOSIS — R55 SYNCOPE AND COLLAPSE: Primary | ICD-10-CM

## 2025-01-27 LAB
ALBUMIN SERPL BCG-MCNC: 3.8 G/DL (ref 3.5–5)
ALP SERPL-CCNC: 106 U/L (ref 34–104)
ALT SERPL W P-5'-P-CCNC: 8 U/L (ref 7–52)
ANION GAP SERPL CALCULATED.3IONS-SCNC: 7 MMOL/L (ref 4–13)
AST SERPL W P-5'-P-CCNC: 13 U/L (ref 13–39)
ATRIAL RATE: 78 BPM
ATRIAL RATE: 80 BPM
BILIRUB SERPL-MCNC: 1 MG/DL (ref 0.2–1)
BUN SERPL-MCNC: 29 MG/DL (ref 5–25)
CALCIUM SERPL-MCNC: 9 MG/DL (ref 8.4–10.2)
CHLORIDE SERPL-SCNC: 110 MMOL/L (ref 96–108)
CO2 SERPL-SCNC: 27 MMOL/L (ref 21–32)
CREAT SERPL-MCNC: 0.93 MG/DL (ref 0.6–1.3)
ERYTHROCYTE [DISTWIDTH] IN BLOOD BY AUTOMATED COUNT: 13.6 % (ref 11.6–15.1)
GFR SERPL CREATININE-BSD FRML MDRD: 80 ML/MIN/1.73SQ M
GLUCOSE SERPL-MCNC: 102 MG/DL (ref 65–140)
HCT VFR BLD AUTO: 39.8 % (ref 36.5–49.3)
HGB BLD-MCNC: 13.1 G/DL (ref 12–17)
MCH RBC QN AUTO: 31.9 PG (ref 26.8–34.3)
MCHC RBC AUTO-ENTMCNC: 32.9 G/DL (ref 31.4–37.4)
MCV RBC AUTO: 97 FL (ref 82–98)
P AXIS: 63 DEGREES
P AXIS: 66 DEGREES
PLATELET # BLD AUTO: 162 THOUSANDS/UL (ref 149–390)
PMV BLD AUTO: 11 FL (ref 8.9–12.7)
POTASSIUM SERPL-SCNC: 3.9 MMOL/L (ref 3.5–5.3)
PR INTERVAL: 142 MS
PR INTERVAL: 142 MS
PROT SERPL-MCNC: 6.4 G/DL (ref 6.4–8.4)
QRS AXIS: 55 DEGREES
QRS AXIS: 57 DEGREES
QRSD INTERVAL: 92 MS
QRSD INTERVAL: 96 MS
QT INTERVAL: 370 MS
QT INTERVAL: 386 MS
QTC INTERVAL: 426 MS
QTC INTERVAL: 440 MS
RBC # BLD AUTO: 4.11 MILLION/UL (ref 3.88–5.62)
SODIUM SERPL-SCNC: 144 MMOL/L (ref 135–147)
T WAVE AXIS: 48 DEGREES
T WAVE AXIS: 51 DEGREES
VENTRICULAR RATE: 78 BPM
VENTRICULAR RATE: 80 BPM
WBC # BLD AUTO: 7.63 THOUSAND/UL (ref 4.31–10.16)

## 2025-01-27 PROCEDURE — 80053 COMPREHEN METABOLIC PANEL: CPT | Performed by: INTERNAL MEDICINE

## 2025-01-27 PROCEDURE — 97167 OT EVAL HIGH COMPLEX 60 MIN: CPT

## 2025-01-27 PROCEDURE — 93010 ELECTROCARDIOGRAM REPORT: CPT | Performed by: INTERNAL MEDICINE

## 2025-01-27 PROCEDURE — 85027 COMPLETE CBC AUTOMATED: CPT | Performed by: INTERNAL MEDICINE

## 2025-01-27 PROCEDURE — 97163 PT EVAL HIGH COMPLEX 45 MIN: CPT

## 2025-01-27 PROCEDURE — 99232 SBSQ HOSP IP/OBS MODERATE 35: CPT | Performed by: INTERNAL MEDICINE

## 2025-01-27 RX ADMIN — FAMOTIDINE 40 MG: 20 TABLET, FILM COATED ORAL at 08:09

## 2025-01-27 RX ADMIN — ENOXAPARIN SODIUM 40 MG: 40 INJECTION SUBCUTANEOUS at 08:09

## 2025-01-27 RX ADMIN — ASPIRIN 81 MG: 81 TABLET, COATED ORAL at 08:09

## 2025-01-27 RX ADMIN — PANTOPRAZOLE SODIUM 40 MG: 40 TABLET, DELAYED RELEASE ORAL at 05:21

## 2025-01-27 RX ADMIN — ATORVASTATIN CALCIUM 40 MG: 40 TABLET, FILM COATED ORAL at 16:43

## 2025-01-27 NOTE — PLAN OF CARE
"  Problem: PHYSICAL THERAPY ADULT  Goal: Performs mobility at highest level of function for planned discharge setting.  See evaluation for individualized goals.  Description: Treatment/Interventions: Functional transfer training, LE strengthening/ROM, Elevations, Therapeutic exercise, Endurance training, Patient/family training, Bed mobility, Gait training, Spoke to nursing, OT, Spoke to case management  Equipment Recommended: Walker (pt has)       See flowsheet documentation for full assessment, interventions and recommendations.  Note: Prognosis: Good  Problem List: Decreased strength, Decreased endurance, Impaired balance, Decreased mobility, Impaired sensation  Assessment: Pt. 74 y.o.male admitted for Syncope and collapse & avulsion fx L middle phalanx . Pt referred to PT for mobility assessment & D/C planning. Please see above for information re: home set-up & PLOF as well as objective findings during PT assessment. PTA, pt reports being I w/ SPC. On eval, pt functioning below baseline hence will continue skilled PT to improve function & safety. Pt require S for transfers & minAx1 for amb w/ RW + cues for techniques & safety. Gait deviations as above but no gross LOB noted.  BP as mentioned above. (-) orthostatic hypotension. The patient's AM-PAC Basic Mobility Inpatient Short Form Raw Score is 17. A Raw score of greater than 16 suggests the patient may benefit from discharge to home. Please also refer to the recommendation of the Physical Therapist for safe discharge planning. From PT standpoint, will recommend Level II (moderate resource intensity) rehab services vs Level III (minimum resource intensity) rehab services at D/C, pending progress. No SOB & dizziness reported t/o session. Nsg staff most recent vital signs as follows: BP 95/51 (BP Location: Right arm)   Pulse 66   Temp 98.4 °F (36.9 °C) (Temporal)   Resp 18   Ht 5' 10\" (1.778 m)   Wt 85.3 kg (188 lb 0.8 oz)   SpO2 97%   BMI 26.98 kg/m² . At " end of session, pt OOB in chair in stable condition, call bell & phone in reach, chair alarm activated. Fall precautions reinforced w/ good understanding. CM to follow. Nsg staff to continue to mobilized pt (OOB in chair for all meals & ambulate in room/unit) as tolerated to prevent further decline in function. Will also recommend [facility] for daily amb &/or daily OOB in chair as appropriate. Nsg notified. Co-eval was necessary to complete this PT eval for the pt's best interest given pt's medical acuity & complexity.    Barriers to Discharge: Inaccessible home environment, Decreased caregiver support  Barriers to Discharge Comments: home alone; (+) stairs    Rehab Resource Intensity Level, PT: II (Moderate Resource Intensity) (vs III, pending progress)    See flowsheet documentation for full assessment.

## 2025-01-27 NOTE — OCCUPATIONAL THERAPY NOTE
"    Occupational Therapy Evaluation     Patient Name: Abhinav Sierra  Today's Date: 1/27/2025  Problem List  Principal Problem:    Syncope and collapse  Active Problems:    Benign essential hypertension    Hyperlipidemia    Peripheral neuropathy    Avulsion fracture of middle phalanx of finger    Past Medical History  Past Medical History:   Diagnosis Date    Arthritis     Coronary artery disease     Hypertension     Obesity      Past Surgical History  Past Surgical History:   Procedure Laterality Date    CERVICAL DISCECTOMY      KNEE SURGERY      SPINE SURGERY           01/27/25 0945   Note Type   Note type Evaluation   Pain Assessment   Pain Assessment Tool 0-10   Pain Score No Pain   Restrictions/Precautions   Weight Bearing Precautions Per Order No   Other Precautions Chair Alarm;Bed Alarm;Fall Risk;Telemetry   Home Living   Type of Home House   Home Layout Two level;Able to live on main level with bedroom/bathroom  (5STE (front); 3STE (back); pt has 1st floor set up)   Bathroom Shower/Tub Tub/shower unit   Bathroom Toilet Raised   Bathroom Equipment Grab bars in shower;Shower chair;Grab bars around toilet   Home Equipment Walker  (rollator)   Prior Function   Level of [place] Independent with ADLs;Independent with functional mobility;Independent with IADLS   Falls in the last 6 months 1 to 4  (2)   Lifestyle   Autonomy PTA pt states independence with his ADLs, transfers, ambulation--with SPC; +falls=2, +   Reciprocal Relationships son   Service to Others worked as a    Intrinsic Gratification watching TV   Subjective   Subjective \"I don't have any real warning when I pass out.\"   ADL   Where Assessed Edge of bed   Eating Assistance 6  Modified independent   Grooming Assistance 6  Modified Independent   UB Bathing Assistance 5  Supervision/Setup   LB Bathing Assistance 4  Minimal Assistance   UB Dressing Assistance 5  Supervision/Setup   LB Dressing Assistance 3  Moderate Assistance "   Toileting Assistance  5  Supervision/Setup   Transfers   Sit to Stand 5  Supervision   Additional items Increased time required;Verbal cues   Stand to Sit 5  Supervision   Additional items Increased time required;Verbal cues   Additional Comments bp's=100/52(standing)   Functional Mobility   Functional Mobility 5  Supervision   Additional items Rolling walker   Balance   Static Sitting Good   Dynamic Sitting Fair +   Static Standing Fair -   Dynamic Standing Poor +   Activity Tolerance   Activity Tolerance Patient limited by fatigue   Medical Staff Made Aware nsg, P.T.   RUE Assessment   RUE Assessment WFL   RUE Strength   RUE Overall Strength Within Functional Limits - able to perform ADL tasks with strength  (4/5 throughout)   LUE Assessment   LUE Assessment WFL   LUE Strength   LUE Overall Strength Within Functional Limits - able to perform ADL tasks with strength  (4/5 throughout)   Hand Function   Gross Motor Coordination Functional   Fine Motor Coordination Functional   Sensation   Light Touch No apparent deficits   Proprioception   Proprioception No apparent deficits   Vision-Basic Assessment   Current Vision   (glasses)   Vision - Complex Assessment   Acuity Able to read clock/calendar on wall without difficulty   Psychosocial   Psychosocial (WDL) WDL   Perception   Inattention/Neglect Appears intact   Cognition   Overall Cognitive Status WFL   Arousal/Participation Alert   Attention Within functional limits   Orientation Level Oriented X4   Memory Within functional limits   Following Commands Follows all commands and directions without difficulty   Assessment   Limitation Decreased ADL status;Decreased UE strength;Decreased Safe judgement during ADL;Decreased endurance;Decreased high-level ADLs   Prognosis Good   Assessment Pt is a 75y/o male admitted to the hospital 2* symptoms of syncope, LOC; x-ray(L hand)=3rd middle phalanx avulsion fx--declining splint. Pt with PMH CAD, HTN, knee sx, spinal sx. PTA  "pt states independence with his ADLs, transfers, ambulation--with SPC; +falls=2, +. During initial eval, pt demonstrated deficits with his functional balance, functional mobility, ADL status, transfer safety, b/l UE strength, and activity tolerance(currently fair=15-20mins). Pt would benefit from continued OT tx for the above deficits.2-5xwk/1-2wks. The patient's raw score on the AM-PAC Daily Activity Inpatient Short Form is 20. A raw score of greater than or equal to 19 suggests the patient may benefit from discharge to home. Please refer to the recommendation of the Occupational Therapist for safe discharge planning.   Goals   Patient Goals \"to get better\"   STG Time Frame   (1-7 days)   Short Term Goal #1 Pt will demonstrate improved activity tolerance to good(20-30mins) and standing tolerance to 3-5mins to assist with ADLs.   Short Term Goal #2 Pt will demonstrate mod I with their sit-stand transfers to assist with completion of their LE dressing.   Short Term Goal  Pt will demonstrate proper walker/transfer safety 100% of the time.   LTG Time Frame   (7-14 days)   Long Term Goal #1 Pt will demonstrate g/g- balance with all functional activities.   Long Term Goal #2 Pt will demonstrate mod I with their UE and LE bathing/dresssing.   Long Term Goal Pt will independently verbalize 2-3 potential fall risks/transfer safety hazards and their appropriate compensation techniques.   Plan   Treatment Interventions ADL retraining;Functional transfer training;UE strengthening/ROM;Endurance training;Patient/family training;Equipment evaluation/education;Compensatory technique education   Goal Expiration Date 02/10/25   OT Treatment Day 0   OT Frequency   (2-5xwk/1-2wks)   Discharge Recommendation   Rehab Resource Intensity Level, OT II (Moderate Resource Intensity)   AM-PAC Daily Activity Inpatient   Lower Body Dressing 3   Bathing 3   Toileting 3   Upper Body Dressing 3   Grooming 4   Eating 4   Daily Activity Raw " Score 20   Daily Activity Standardized Score (Calc for Raw Score >=11) 42.03   AM-PAC Applied Cognition Inpatient   Following a Speech/Presentation 4   Understanding Ordinary Conversation 4   Taking Medications 4   Remembering Where Things Are Placed or Put Away 4   Remembering List of 4-5 Errands 4   Taking Care of Complicated Tasks 4   Applied Cognition Raw Score 24   Applied Cognition Standardized Score 62.21   Sam [place]

## 2025-01-27 NOTE — PLAN OF CARE
Problem: Potential for Falls  Goal: Patient will remain free of falls  Description: INTERVENTIONS:  - Educate patient/family on patient safety including physical limitations  - Instruct patient to call for assistance with activity   - Consult OT/PT to assist with strengthening/mobility   - Keep Call bell within reach  - Keep bed low and locked with side rails adjusted as appropriate  - Keep care items and personal belongings within reach  - Initiate and maintain comfort rounds  - Make Fall Risk Sign visible to staff  - Offer Toileting every 2 Hours, in advance of need  - Apply yellow socks and bracelet for high fall risk patients  - Consider moving patient to room near nurses station  Outcome: Progressing     Problem: PAIN - ADULT  Goal: Verbalizes/displays adequate comfort level or baseline comfort level  Description: Interventions:  - Encourage patient to monitor pain and request assistance  - Assess pain using appropriate pain scale  - Administer analgesics based on type and severity of pain and evaluate response  - Implement non-pharmacological measures as appropriate and evaluate response  - Consider cultural and social influences on pain and pain management  - Notify physician/advanced practitioner if interventions unsuccessful or patient reports new pain  Outcome: Progressing     Problem: SAFETY ADULT  Goal: Patient will remain free of falls  Description: INTERVENTIONS:  - Educate patient/family on patient safety including physical limitations  - Instruct patient to call for assistance with activity   - Consult OT/PT to assist with strengthening/mobility   - Keep Call bell within reach  - Keep bed low and locked with side rails adjusted as appropriate  - Keep care items and personal belongings within reach  - Initiate and maintain comfort rounds  - Make Fall Risk Sign visible to staff  - Offer Toileting every 2 Hours, in advance of need  - Apply yellow socks and bracelet for high fall risk patients  -  Consider moving patient to room near nurses station  Outcome: Progressing  Goal: Maintain or return to baseline ADL function  Description: INTERVENTIONS:  -  Assess patient's ability to carry out ADLs; assess patient's baseline for ADL function and identify physical deficits which impact ability to perform ADLs (bathing, care of mouth/teeth, toileting, grooming, dressing, etc.)  - Assess/evaluate cause of self-care deficits   - Assess range of motion  - Assess patient's mobility; develop plan if impaired  - Assess patient's need for assistive devices and provide as appropriate  - Encourage maximum independence but intervene and supervise when necessary  - Involve family in performance of ADLs  - Assess for home care needs following discharge   - Consider OT consult to assist with ADL evaluation and planning for discharge  - Provide patient education as appropriate  Outcome: Progressing  Goal: Maintains/Returns to pre admission functional level  Description: INTERVENTIONS:  - Perform AM-PAC 6 Click Basic Mobility/ Daily Activity assessment daily.  - Set and communicate daily mobility goal to care team and patient/family/caregiver.   - Collaborate with rehabilitation services on mobility goals if consulted  - Perform Range of Motion  times a day.  - Reposition patient every  hours.  - Dangle patient  times a day  - Stand patient  times a day  - Ambulate patient  times a day  - Out of bed to chair  times a day   - Out of bed for meal times a day  - Out of bed for toileting  - Record patient progress and toleration of activity level   Outcome: Progressing     Problem: MUSCULOSKELETAL - ADULT  Goal: Maintain or return mobility to safest level of function  Description: INTERVENTIONS:  - Assess patient's ability to carry out ADLs; assess patient's baseline for ADL function and identify physical deficits which impact ability to perform ADLs (bathing, care of mouth/teeth, toileting, grooming, dressing, etc.)  -  Assess/evaluate cause of self-care deficits   - Assess range of motion  - Assess patient's mobility  - Assess patient's need for assistive devices and provide as appropriate  - Encourage maximum independence but intervene and supervise when necessary  - Involve family in performance of ADLs  - Assess for home care needs following discharge   - Consider OT consult to assist with ADL evaluation and planning for discharge  - Provide patient education as appropriate  Outcome: Progressing     Problem: INFECTION - ADULT  Goal: Absence or prevention of progression during hospitalization  Description: INTERVENTIONS:  - Assess and monitor for signs and symptoms of infection  - Monitor lab/diagnostic results  - Monitor all insertion sites, i.e. indwelling lines, tubes, and drains  - Monitor endotracheal if appropriate and nasal secretions for changes in amount and color  - [place] appropriate cooling/warming therapies per order  - Administer medications as ordered  - Instruct and encourage patient and family to use good hand hygiene technique  - Identify and instruct in appropriate isolation precautions for identified infection/condition  Outcome: Progressing  Goal: Absence of fever/infection during neutropenic period  Description: INTERVENTIONS:  - Monitor WBC    Outcome: Progressing     Problem: DISCHARGE PLANNING  Goal: Discharge to home or other facility with appropriate resources  Description: INTERVENTIONS:  - Identify barriers to discharge w/patient and caregiver  - Arrange for needed discharge resources and transportation as appropriate  - Identify discharge learning needs (meds, wound care, etc.)  - Arrange for interpretive services to assist at discharge as needed  - Refer to Case Management Department for coordinating discharge planning if the patient needs post-hospital services based on physician/advanced practitioner order or complex needs related to functional status, cognitive ability, or social support  system  Outcome: Progressing     Problem: Knowledge Deficit  Goal: Patient/family/caregiver demonstrates understanding of disease process, treatment plan, medications, and discharge instructions  Description: Complete learning assessment and assess knowledge base.  Interventions:  - Provide teaching at level of understanding  - Provide teaching via preferred learning methods  Outcome: Progressing

## 2025-01-27 NOTE — PHYSICAL THERAPY NOTE
PT EVALUATION    Pt. Name: Abhinav Sierra  Pt. Age: 74 y.o.  MRN: [id number]  LENGTH OF STAY: 2      Admitting Diagnoses:   Syncope and collapse [R55]  Lightheadedness [R42]  Head injury [S09.90XA]  Near syncope [R55]    Past Medical History:   Diagnosis Date    Arthritis     Coronary artery disease     Hypertension     Obesity        Past Surgical History:   Procedure Laterality Date    CERVICAL DISCECTOMY      KNEE SURGERY      SPINE SURGERY         Imaging Studies:  XR hand 3+ views LEFT   ED Interpretation by Roque Foreman PA-C (01/25 7023)   No fracture on personal interpretation.      Final Result by Sharri Manley MD (01/25 1159)      Small avulsion fracture at the volar base of the third middle phalanx      A smaller well-corticated bony density seen at the volar base of the fourth middle phalanx may be chronic calcification alternatively nondisplaced volar plate avulsion could've similar appearance in appropriate clinical setting      Computerized Assisted Algorithm (CAA) may have been used to analyze all applicable images.         Resident: Lennox Howe I, the attending radiologist, have reviewed the images and agree with the final report above.      Workstation performed: [id number]         CT head without contrast   Final Result by Tor Machuca MD (01/25 0043)      No acute intracranial hemorrhage, midline shift, or mass effect. Redemonstrated periventricular white matter abnormality/calcification likely due to prior injury/insult.                  Workstation performed: [id number]               01/27/25 1009   PT Last Visit   PT Visit Date 01/27/25   Note Type   Note type Evaluation   Pain Assessment   Pain Score No Pain   Restrictions/Precautions   Weight Bearing Precautions Per Order No   Other Precautions Chair Alarm;Bed Alarm;Telemetry;Fall Risk   Home Living   Type of Home House   Home Layout Two level;Performs ADLs on one level;Able to live on main level with  bedroom/bathroom;Stairs to enter with rails  (5STE (front); 3STE (back); pt has 1st floor set up)   Bathroom Shower/Tub Tub/shower unit   Bathroom Toilet Raised   Bathroom Equipment Grab bars in shower;Shower chair;Grab bars around toilet   Home Equipment Walker;Other (Comment);Cane  (rollator)   Prior Function   Level of [place] Independent with functional mobility;Independent with ADLs;Independent with IADLS  (ambulates w/ SPC)   Lives With Alone   Receives Help From Family   Falls in the last 6 months 1 to 4  (2x, leading to this admission 2* to syncope)   Vocational Retired   Comments (+)    General   Family/Caregiver Present No   Cognition   Overall Cognitive Status WFL   Arousal/Participation Alert   Orientation Level Oriented X4   Following Commands Follows all commands and directions without difficulty   Comments pleasant & cooperative   Subjective   Subjective Pt agreeable to PT/OT evals.   RUE Assessment   RUE Assessment   (refer to OT)   LUE Assessment   LUE Assessment   (refer to OT)   RLE Assessment   RLE Assessment WFL  (4-/5 grossly)   LLE Assessment   LLE Assessment WFL  (4-/5 grossly)   Coordination   Movements are Fluid and Coordinated 1   Sensation X  (impaired sensation B/L feet 2* to neuropathy per pt)   Bed Mobility   Supine to Sit Unable to assess   Sit to Supine Unable to assess   Additional Comments pt OOB in chair pre & post-session; BP sitting 106/73   Transfers   Sit to Stand 5  Supervision   Additional items Armrests;Verbal cues   Stand to Sit 5  Supervision   Additional items Armrests;Verbal cues   Additional Comments cues for techniques & safety; w/ RW; BP standing 100/52   Ambulation/Elevation   Gait pattern Wide ANNIE;Decreased foot clearance;Short stride;Excessively slow;Decreased heel strike;Decreased toe off   Gait Assistance 4  Minimal assist   Additional items Assist x 1;Verbal cues;Tactile cues   Assistive Device Rolling walker   Distance 50'x1   Ambulation/Elevation  "Additional Comments unsteady gait but no gross LOB noted; BP after amb 125/77   Balance   Static Sitting Good   Dynamic Sitting Fair +   Static Standing Fair -  (w/ RW)   Dynamic Standing Poor +  (w/ RW)   Ambulatory Poor +  (w/ RW)   Endurance Deficit   Endurance Deficit Yes   Endurance Deficit Description weakness; fatigue   Activity Tolerance   Activity Tolerance Patient limited by fatigue;Treatment limited secondary to medical complications (Comment)   Medical Staff Made Aware OTR Sam   Nurse Made Aware yes   Assessment   Prognosis Good   Problem List Decreased strength;Decreased endurance;Impaired balance;Decreased mobility;Impaired sensation   Assessment Pt. 74 y.o.male admitted for Syncope and collapse & avulsion fx L middle phalanx . Pt referred to PT for mobility assessment & D/C planning. Please see above for information re: home set-up & PLOF as well as objective findings during PT assessment. PTA, pt reports being I w/ SPC. On eval, pt functioning below baseline hence will continue skilled PT to improve function & safety. Pt require S for transfers & minAx1 for amb w/ RW + cues for techniques & safety. Gait deviations as above but no gross LOB noted.  BP as mentioned above. (-) orthostatic hypotension. The patient's AM-PAC Basic Mobility Inpatient Short Form Raw Score is 17. A Raw score of greater than 16 suggests the patient may benefit from discharge to home. Please also refer to the recommendation of the Physical Therapist for safe discharge planning. From PT standpoint, will recommend Level II (moderate resource intensity) rehab services vs Level III (minimum resource intensity) rehab services at D/C, pending progress. No SOB & dizziness reported t/o session. Nsg staff most recent vital signs as follows: BP 95/51 (BP Location: Right arm)   Pulse 66   Temp 98.4 °F (36.9 °C) (Temporal)   Resp 18   Ht 5' 10\" (1.778 m)   Wt 85.3 kg (188 lb 0.8 oz)   SpO2 97%   BMI 26.98 kg/m² . At end of session, " pt OOB in chair in stable condition, call bell & phone in reach, chair alarm activated. Fall precautions reinforced w/ good understanding. CM to follow. Nsg staff to continue to mobilized pt (OOB in chair for all meals & ambulate in room/unit) as tolerated to prevent further decline in function. Will also recommend Restorative for daily amb &/or daily OOB in chair as appropriate. Nsg notified. Co-eval was necessary to complete this PT eval for the pt's best interest given pt's medical acuity & complexity.   Barriers to Discharge Inaccessible home environment;Decreased caregiver support   Barriers to Discharge Comments home alone; (+) stairs   Goals   Patient Goals to go to rehab   STG Expiration Date 02/10/25   Short Term Goal #1 Goals to be met in 14 days; pt will be able to: 1) inc strength & balance by 1/2 grade to improve overall functional mobility & dec fall risk; 2) inc bed mobility to modified I for pt to be able to get in/OOB safely w/ proper techniques 100% of the time, to dec caregiver burden & safely function at home; 3) inc transfers to modified I for pt to transition safely from one surface to another w/o % of the time, to dec caregiver burden & safely function at home; 4) inc amb w/ RW approx. >350' w/ S for pt to ambulate community distances w/o any % of the time, to dec caregiver burden & safely function at home; 5) negotiate stairs w/ S for inc safety during stair mgt inside/outside of home & dec caregiver burden; 6) pt/caregiver ed   PT Treatment Day 0   Plan   Treatment/Interventions Functional transfer training;LE strengthening/ROM;Elevations;Therapeutic exercise;Endurance training;Patient/family training;Bed mobility;Gait training;Spoke to nursing;OT;Spoke to case management   PT Frequency 3-5x/wk   Discharge Recommendation   Rehab Resource Intensity Level, PT II (Moderate Resource Intensity)  (vs III, pending progress)   Equipment Recommended Walker  (pt has)   Additional  Comments restorative for daily amb   AM-PAC Basic Mobility Inpatient   Turning in Flat Bed Without Bedrails 3   Lying on Back to Sitting on Edge of Flat Bed Without Bedrails 3   Moving Bed to Chair 3   Standing Up From Chair Using Arms 3   Walk in Room 3   Climb 3-5 Stairs With Railing 2   Basic Mobility Inpatient Raw Score 17   Basic Mobility Standardized Score 39.67   [facility] Highest Level Of Mobility   JH-HL Goal 5: Stand one or more mins   -HLM Achieved 7: Walk 25 feet or more   End of Consult   Patient Position at End of Consult Bedside chair;Bed/Chair alarm activated;All needs within reach   End of Consult Comments Pt in stable condition. All needs in reach. Chair alarm activated & connected to call bell system.   Hx/personal factors: co-morbidities, inaccessible home, home alone, advanced age, telemetry, use of AD, h/o of falls, fall risk, and assist w/ ADL's  Examination: dec mobility, dec balance, dec endurance, dec amb, risk for falls  Clinical: unpredictable (ongoing medical status, abnormal lab values, and risk for falls)  Complexity: high    Angel Herrmann

## 2025-01-27 NOTE — CASE MANAGEMENT
Case Management Assessment & Discharge Planning Note    Patient name Abhinav Sierra  [facility] /[facility] -* MRN [id number]  : 1950 Date 2025       Current Admission Date: 2025  Current Admission Diagnosis:Syncope and collapse   Patient Active Problem List    Diagnosis Date Noted Date Diagnosed    Syncope and collapse 2025     Avulsion fracture of middle phalanx of finger 2025     Bilateral lower extremity edema 2024     Chronic pain syndrome 2024     Gastroesophageal reflux disease without esophagitis 2023     Family history of colon cancer 2023     Continuous opioid dependence (HCC) 2021     Hereditary and idiopathic peripheral neuropathy 2020     Hyperglycemia 2019     BPH with obstruction/lower urinary tract symptoms 05/15/2019     Morbid (severe) obesity due to excess calories (HCC) 2019     Pre-diabetes 2019     Coronary artery disease involving native coronary artery of native heart without angina pectoris 10/30/2018     Acute idiopathic gout of left wrist 2018     Urinary urgency 2017     Gait disturbance 2016     S/P cervical spinal fusion 2016     Gait disorder 2016     Chronic pain disorder 2016     Cervical stenosis of spinal canal 2016     Cervical radicular pain 2016     Cervical radiculopathy 2016     Incomplete tear of rotator cuff 2016     Arthropathy of knee 03/15/2016     Hyperlipidemia 10/15/2015     Brachial radiculitis 2015     Cervical spondylosis with myelopathy 2015     Lumbosacral radiculitis 2015     Vitamin B deficiency 2015     Vitamin D deficiency 2015     Peripheral neuropathy 2015     Lumbar stenosis 2015     Benign essential hypertension 2013       LOS (days): 2  Geometric Mean LOS (GMLOS) (days): 2.3  Days to GMLOS:0.3     OBJECTIVE:    Risk of Unplanned Readmission Score: 14.29          Current admission status: Inpatient       Preferred Pharmacy:   [id number] - [place] - [address]  [address]  [place] [address]  Phone: [phone] Fax: [phone]    Primary Care Provider: Alexys Daniels MD    Primary Insurance: MEDICARE  Secondary Insurance: AARP    ASSESSMENT:  Active Health Care Proxies       Km Sierra Health Care Representative - Son   Primary Phone: [phone] (Home)                 Advance Directives  Does patient have a Health Care POA?: Yes  Does patient have Advance Directives?: Yes  Advance Directives: Power of  for health care, Living will  Primary Contact: Km Sierra (Son) [phone] (H)         Readmission Root Cause  30 Day Readmission: No    Patient Information  Admitted from:: Home  Mental Status: Alert  During Assessment patient was accompanied by: Not accompanied during assessment  Assessment information provided by:: Patient  Primary Caregiver: Self  Support Systems: Self, Son  County of Residence: [place]  What city do you live in?: [place]  Home entry access options. Select all that apply.: Stairs  Number of steps to enter home.: 5 (3 from the back)  Do the steps have railings?: Yes  Type of Current Residence: 2 [place] home  Upon entering residence, is there a bedroom on the main floor (no further steps)?: Yes  Upon entering residence, is there a bathroom on the main floor (no further steps)?: Yes  Living Arrangements: Lives Alone  Is patient a ?: No    Activities of Daily Living Prior to Admission  Functional Status: Independent  Completes ADLs independently?: Yes  Ambulates independently?: Yes  Does patient use assisted devices?: Yes  Assisted Devices (DME) used: Straight Cane, Other (Comment) (Grab bars)  Does patient currently own DME?: Yes  What DME does the patient currently own?: Straight Cane, Other (Comment) (Grabbars)  Does patient have a history of Outpatient Therapy (PT/OT)?: Yes  Does  the patient have a history of Short-Term Rehab?: No  Does patient have a history of HHC?: Yes (Cannot remember past agency)  Does patient currently have HHC?: No         Patient Information Continued  Income Source: Pension/senior care  Does patient have prescription coverage?: Yes  Does patient receive dialysis treatments?: No  Does patient have a history of substance abuse?: No  Does patient have a history of Mental Health Diagnosis?: No         Means of Transportation  Means of Transport to [facility]:: Drives Self      Social Determinants of Health (SDOH)      Flowsheet Row Most Recent Value   Housing Stability    In the last 12 months, was there a time when you were not able to pay the mortgage or rent on time? N   In the past 12 months, how many times have you moved where you were living? 0   At any time in the past 12 months, were you homeless or living in a shelter (including now)? N   Transportation Needs    In the past 12 months, has lack of transportation kept you from medical appointments or from getting medications? no   In the past 12 months, has lack of transportation kept you from meetings, work, or from getting things needed for daily living? No   Food Insecurity    Within the past 12 months, you worried that your food would run out before you got the money to buy more. Never true   Within the past 12 months, the food you bought just didn't last and you didn't have money to get more. Never true   Utilities    In the past 12 months has the electric, gas, oil, or water company threatened to shut off services in your home? No            DISCHARGE DETAILS:    Discharge planning discussed with:: Patient and son  Freedom of Choice: Yes  Comments - Freedom of Choice: [place] referal to STR  CM contacted family/caregiver?: Yes  Were Treatment Team discharge recommendations reviewed with patient/caregiver?: Yes  Did patient/caregiver verbalize understanding of patient care needs?: Yes  Were patient/caregiver  advised of the risks associated with not following Treatment Team discharge recommendations?: Yes    Contacts  Patient Contacts: Km Sierra ([place])   [phone] (H)  Relationship to Patient:: Family  Contact Method: Phone  Phone Number: Km Sierra ([place])   [phone] (H)  Reason/Outcome: Continuity of Care, Emergency Contact, Discharge Planning    Requested Home Health Care         Is the patient interested in HHC at discharge?: No    DME Referral Provided  Referral made for DME?: No    Other Referral/Resources/Interventions Provided:  Interventions: Short Term Rehab    Would you like to participate in our [facility] service program?  : No - Declined    Treatment Team Recommendation: Short Term Rehab  Discharge Destination Plan:: Short Term Rehab              CM met with patient at bedside to introduce self and role with DC planning.  Patient resides alone in a 2 story home with a first floor set up.  Patient utilizes a SPC and owns a shower chair.  Patient drives.  CM reviewed therapy recommendation of STR with patient, patient agreeable to blanket referrals being placed.    CM spoke with patient's son via PC, son in agreement with blanket referrals to STR.  Son understands [facility] is acute rehab, patient too functional for this LOC.      CM sent blanket referral to STR via [place]. CM will continue to follow.

## 2025-01-27 NOTE — PLAN OF CARE
Problem: Potential for Falls  Goal: Patient will remain free of falls  Description: INTERVENTIONS:  - Educate patient/family on patient safety including physical limitations  - Instruct patient to call for assistance with activity   - Consult OT/PT to assist with strengthening/mobility   - Keep Call bell within reach  - Keep bed low and locked with side rails adjusted as appropriate  - Keep care items and personal belongings within reach  - Initiate and maintain comfort rounds  - Make Fall Risk Sign visible to staff  - Offer Toileting every 2 Hours, in advance of need  - Apply yellow socks and bracelet for high fall risk patients  - Consider moving patient to room near nurses station  Outcome: Progressing     Problem: PAIN - ADULT  Goal: Verbalizes/displays adequate comfort level or baseline comfort level  Description: Interventions:  - Encourage patient to monitor pain and request assistance  - Assess pain using appropriate pain scale  - Administer analgesics based on type and severity of pain and evaluate response  - Implement non-pharmacological measures as appropriate and evaluate response  - Consider cultural and social influences on pain and pain management  - Notify physician/advanced practitioner if interventions unsuccessful or patient reports new pain  Outcome: Progressing     Problem: SAFETY ADULT  Goal: Patient will remain free of falls  Description: INTERVENTIONS:  - Educate patient/family on patient safety including physical limitations  - Instruct patient to call for assistance with activity   - Consult OT/PT to assist with strengthening/mobility   - Keep Call bell within reach  - Keep bed low and locked with side rails adjusted as appropriate  - Keep care items and personal belongings within reach  - Initiate and maintain comfort rounds  - Make Fall Risk Sign visible to staff  - Offer Toileting every 2 Hours, in advance of need  - Apply yellow socks and bracelet for high fall risk patients  -  Consider moving patient to room near nurses station  Outcome: Progressing  Goal: Maintain or return to baseline ADL function  Description: INTERVENTIONS:  -  Assess patient's ability to carry out ADLs; assess patient's baseline for ADL function and identify physical deficits which impact ability to perform ADLs (bathing, care of mouth/teeth, toileting, grooming, dressing, etc.)  - Assess/evaluate cause of self-care deficits   - Assess range of motion  - Assess patient's mobility; develop plan if impaired  - Assess patient's need for assistive devices and provide as appropriate  - Encourage maximum independence but intervene and supervise when necessary  - Involve family in performance of ADLs  - Assess for home care needs following discharge   - Consider OT consult to assist with ADL evaluation and planning for discharge  - Provide patient education as appropriate  Outcome: Progressing  Goal: Maintains/Returns to pre admission functional level  Description: INTERVENTIONS:  - Perform AM-PAC 6 Click Basic Mobility/ Daily Activity assessment daily.  - Set and communicate daily mobility goal to care team and patient/family/caregiver.   - Collaborate with rehabilitation services on mobility goals if consulted  - Perform Range of Motion  times a day.  - Reposition patient every  hours.  - Dangle patient  times a day  - Stand patient  times a day  - Ambulate patient  times a day  - Out of bed to chair times a day   - Out of bed for meals  times a day  - Out of bed for toileting  - Record patient progress and toleration of activity level   Outcome: Progressing     Problem: MUSCULOSKELETAL - ADULT  Goal: Maintain or return mobility to safest level of function  Description: INTERVENTIONS:  - Assess patient's ability to carry out ADLs; assess patient's baseline for ADL function and identify physical deficits which impact ability to perform ADLs (bathing, care of mouth/teeth, toileting, grooming, dressing, etc.)  -  Assess/evaluate cause of self-care deficits   - Assess range of motion  - Assess patient's mobility  - Assess patient's need for assistive devices and provide as appropriate  - Encourage maximum independence but intervene and supervise when necessary  - Involve family in performance of ADLs  - Assess for home care needs following discharge   - Consider OT consult to assist with ADL evaluation and planning for discharge  - Provide patient education as appropriate  Outcome: Progressing     Problem: INFECTION - ADULT  Goal: Absence or prevention of progression during hospitalization  Description: INTERVENTIONS:  - Assess and monitor for signs and symptoms of infection  - Monitor lab/diagnostic results  - Monitor all insertion sites, i.e. indwelling lines, tubes, and drains  - Monitor endotracheal if appropriate and nasal secretions for changes in amount and color  - Monument appropriate cooling/warming therapies per order  - Administer medications as ordered  - Instruct and encourage patient and family to use good hand hygiene technique  - Identify and instruct in appropriate isolation precautions for identified infection/condition  Outcome: Progressing  Goal: Absence of fever/infection during neutropenic period  Description: INTERVENTIONS:  - Monitor WBC    Outcome: Progressing     Problem: DISCHARGE PLANNING  Goal: Discharge to home or other facility with appropriate resources  Description: INTERVENTIONS:  - Identify barriers to discharge w/patient and caregiver  - Arrange for needed discharge resources and transportation as appropriate  - Identify discharge learning needs (meds, wound care, etc.)  - Arrange for interpretive services to assist at discharge as needed  - Refer to Case Management Department for coordinating discharge planning if the patient needs post-hospital services based on physician/advanced practitioner order or complex needs related to functional status, cognitive ability, or social support  system  Outcome: Progressing     Problem: Knowledge Deficit  Goal: Patient/family/caregiver demonstrates understanding of disease process, treatment plan, medications, and discharge instructions  Description: Complete learning assessment and assess knowledge base.  Interventions:  - Provide teaching at level of understanding  - Provide teaching via preferred learning methods  Outcome: Progressing

## 2025-01-27 NOTE — PLAN OF CARE
Problem: OCCUPATIONAL THERAPY ADULT  Goal: Performs self-care activities at highest level of function for planned discharge setting.  See evaluation for individualized goals.  Description: Treatment Interventions: ADL retraining, Functional transfer training, UE strengthening/ROM, Endurance training, Patient/family training, Equipment evaluation/education, Compensatory technique education          See flowsheet documentation for full assessment, interventions and recommendations.   Note: Limitation: Decreased ADL status, Decreased UE strength, Decreased Safe judgement during ADL, Decreased endurance, Decreased high-level ADLs  Prognosis: Good  Assessment: Pt is a 75y/o male admitted to the hospital 2* symptoms of syncope, LOC; x-ray(L hand)=3rd middle phalanx avulsion fx--declining splint. Pt with PMH CAD, HTN, knee sx, spinal sx. PTA pt states independence with his ADLs, transfers, ambulation--with SPC; +falls=2, +. During initial eval, pt demonstrated deficits with his functional balance, functional mobility, ADL status, transfer safety, b/l UE strength, and activity tolerance(currently fair=15-20mins). Pt would benefit from continued OT tx for the above deficits.2-5xwk/1-2wks. The patient's raw score on the AM-PAC Daily Activity Inpatient Short Form is 20. A raw score of greater than or equal to 19 suggests the patient may benefit from discharge to home. Please refer to the recommendation of the Occupational Therapist for safe discharge planning.     Rehab Resource Intensity Level, OT: II (Moderate Resource Intensity)

## 2025-01-27 NOTE — RESTORATIVE TECHNICIAN NOTE
Restorative Technician Note      Patient Name: Abhinav Sierra     Restorative Tech Visit Date: 01/27/25  Note Type: Mobility  Patient Position Upon Consult: Bedside chair  Activity Performed: Ambulated  Assistive Device: Roller walker  Patient Position at End of Consult: Bedside chair; All needs within reach; Bed/Chair alarm activated      ns

## 2025-01-27 NOTE — APP STUDENT NOTE
[facility] STUDENT  Inpatient Progress Note for TRAINING ONLY  Not Part of Legal Medical Record       Progress Note - Abhinav Sierra 74 y.o. male MRN: [id number]    Unit/Bed#: E4 -01 Encounter: [id number]      Assessment & Plan:     Syncope and collapse   Echocardiogram was unremarkable   Telemetry was unremarkable   Labs WNL   Blood pressure WNL - no orthostasis   CT head showed no intracranial bleed or cerebral ischemia   Consult case management about setting up outpatient PT/OT - follow up with patient and family about moving into assisted living.   Avulsion fracture of middle phalanx   Xray of left hand showed small avulsion fracture on the volar side of the third digit   Fracture caused by the fall  Patient was seen by orthopedics   Hypertension   Blood pressure within normal limits  Continue lisinopril as prescribed   Hyperlipidemia   Lipid panel came back unremarkable  Continue atorvastatin   Peripheral neuropathy   No longer takes gabapentin   Manage with [facility] neurology     Patient should be discharged today.       Subjective:   Patient is a 74 year old male with a PMH of HTN, HLD, and peripheral neuropathy who was admitted for recurrent episodes of syncope. Within the last 9 days, patient states that he has had recurrent spells of lightheadedness and dizziness that come on randomly but has fainted twice, both times resulting in injury. Patient states that the syncope comes on suddenly, not precipitated by any symptoms. He was able to get himself up but it took an hour and then he took a few hours to calm down and recover. He has two abrasions on his forehead and an avulsed phalanx from his fall. Patient has not had any episodes of syncope while being admitted to the hospital however he does get bouts of lightheadedness and dizziness. Patient wants to inquire about outpatient therapy for dizziness and falling. Patient lives alone.     Objective:     Vitals: Blood pressure 95/51, pulse 66, temperature 98.4 °F  "(36.9 °C), temperature source Temporal, resp. rate 18, height 5' 10\" (1.778 m), weight 85.3 kg (188 lb 0.8 oz), SpO2 97%.,Body mass index is 26.98 kg/m².      Intake/Output Summary (Last 24 hours) at 1/27/2025 1023  Last data filed at 1/27/2025 0900  Gross per 24 hour   Intake 580 ml   Output 500 ml   Net 80 ml       Physical Exam: {Exam, Complete:72841}     Invasive Devices       Peripheral Intravenous Line  Duration             Peripheral IV 01/25/25 Dorsal (posterior);Right Wrist 2 days                    Lab, Imaging and other studies: {Results Review Statement:43008::\"No pertinent imaging studies reviewed.\"}  VTE Pharmacologic Prophylaxis: {Pharmacologic VTE Prophylaxis:293287324}  VTE Mechanical Prophylaxis: {Mechanical VTE Prophylaxis:60740}    "

## 2025-01-28 VITALS
WEIGHT: 194.67 LBS | SYSTOLIC BLOOD PRESSURE: 125 MMHG | HEIGHT: 70 IN | DIASTOLIC BLOOD PRESSURE: 64 MMHG | RESPIRATION RATE: 18 BRPM | BODY MASS INDEX: 27.87 KG/M2 | OXYGEN SATURATION: 97 % | TEMPERATURE: 98.2 F | HEART RATE: 75 BPM

## 2025-01-28 PROCEDURE — 99239 HOSP IP/OBS DSCHRG MGMT >30: CPT | Performed by: INTERNAL MEDICINE

## 2025-01-28 RX ADMIN — DEXTRAN 70, GLYCERIN, HYPROMELLOSE 1 DROP: 1; 2; 3 SOLUTION/ DROPS OPHTHALMIC at 08:10

## 2025-01-28 RX ADMIN — FAMOTIDINE 40 MG: 20 TABLET, FILM COATED ORAL at 08:10

## 2025-01-28 RX ADMIN — LISINOPRIL 40 MG: 20 TABLET ORAL at 08:10

## 2025-01-28 RX ADMIN — ENOXAPARIN SODIUM 40 MG: 40 INJECTION SUBCUTANEOUS at 08:10

## 2025-01-28 RX ADMIN — PANTOPRAZOLE SODIUM 40 MG: 40 TABLET, DELAYED RELEASE ORAL at 05:33

## 2025-01-28 RX ADMIN — DEXTRAN 70, GLYCERIN, HYPROMELLOSE 1 DROP: 1; 2; 3 SOLUTION/ DROPS OPHTHALMIC at 05:34

## 2025-01-28 RX ADMIN — ASPIRIN 81 MG: 81 TABLET, COATED ORAL at 08:10

## 2025-01-28 NOTE — ASSESSMENT & PLAN NOTE
History of hypertension hyperlipidemia peripheral neuropathy who presented to the hospital with recurrent syncope  Cardiac workup thus far negative.  No events on telemetry.  Echocardiogram without valvular pathology  Orthostatic vitals negative.  Cardiology ordered Zio patch  Discharge to short-term rehab

## 2025-01-28 NOTE — PROGRESS NOTES
Progress Note - Hospitalist   Name: Abhinav Sierra 74 y.o. male I MRN: [id number]  Unit/Bed#: E4 -01 I Date of Admission: 1/24/2025   Date of Service: 1/27/2025 I Hospital Day: 2    Assessment & Plan  Syncope and collapse  History of hypertension hyperlipidemia peripheral neuropathy who presented to the hospital with recurrent syncope  Cardiac workup thus far negative.  No events on telemetry.  Echocardiogram without valvular pathology  Orthostatic vitals negative.  Awaiting PT/OT evaluations for discharge needs  Zio patch on DC  Benign essential hypertension  Blood pressure stable continue lisinopril  Hyperlipidemia  Continue atorvastatin  Peripheral neuropathy  Follows with [facility] neurology.  Reports no longer taking gabapentin.  Avulsion fracture of middle phalanx of finger  Secondary to fall.  Seen by orthopedics.  No surgical intervention recommended, they discussed finger immobilizer but patient prefers to hold off on this  Range of motion as tolerated  PT OT as needed  Follow-up outpatient with orthopedic hand surgery    VTE Pharmacologic Prophylaxis: VTE Score: 3 Moderate Risk (Score 3-4) - Pharmacological DVT Prophylaxis Ordered: enoxaparin (Lovenox).    Mobility:   Basic Mobility Inpatient Raw Score: 17  JH-HLM Goal: 5: Stand one or more mins  JH-HLM Achieved: 8: Walk 250 feet ot more  JH-HLM Goal NOT achieved. Continue with multidisciplinary rounding and encourage appropriate mobility to improve upon JH-HLM goals.    Patient Centered Rounds: I performed bedside rounds with nursing staff today.   Discussions with Specialists or Other Care Team Provider: Nursing, case management    Education and Discussions with Family / Patient: Updated  (son) via phone.    Current Length of Stay: 2 day(s)  Current Patient Status: Inpatient   Certification Statement: The patient will continue to require additional inpatient hospital stay due to pending placement  Discharge Plan: Anticipate discharge in  24-48 hrs to rehab facility.    Code Status: Level 3 - DNAR and DNI    Subjective   Patient was seen evaluated bedside, denies chest pain palpitation shortness of breath    Objective :  Temp:  [97.4 °F (36.3 °C)-98.4 °F (36.9 °C)] 97.4 °F (36.3 °C)  HR:  [62-74] 62  BP: ()/(49-58) 113/49  Resp:  [18] 18  SpO2:  [95 %-99 %] 99 %  O2 Device: None (Room air)    Body mass index is 26.98 kg/m².     Input and Output Summary (last 24 hours):     Intake/Output Summary (Last 24 hours) at 1/27/2025 1936  Last data filed at 1/27/2025 0900  Gross per 24 hour   Intake 580 ml   Output --   Net 580 ml       Physical Exam  Constitutional:       General: He is not in acute distress.  HENT:      Head: Atraumatic.   Cardiovascular:      Rate and Rhythm: Normal rate and regular rhythm.      Heart sounds: No murmur heard.  Pulmonary:      Effort: Pulmonary effort is normal. No respiratory distress.      Breath sounds: Normal breath sounds. No wheezing.   Abdominal:      General: Bowel sounds are normal. There is no distension.      Palpations: Abdomen is soft.      Tenderness: There is no abdominal tenderness.   Musculoskeletal:         General: No swelling.      Cervical back: Neck supple.   Skin:     General: Skin is warm and dry.   Neurological:      General: No focal deficit present.      Mental Status: He is alert.           Lines/Drains:              Lab Results: I have reviewed the following results:   Results from last 7 days   Lab Units 01/27/25  0522 01/26/25  0606 01/25/25  0730   WBC Thousand/uL 7.63   < > 7.99   HEMOGLOBIN g/dL 13.1   < > 12.7   HEMATOCRIT % 39.8   < > 38.4   PLATELETS Thousands/uL 162   < > 151   SEGS PCT %  --   --  63   LYMPHO PCT %  --   --  23   MONO PCT %  --   --  10   EOS PCT %  --   --  4    < > = values in this interval not displayed.     Results from last 7 days   Lab Units 01/27/25  0522   SODIUM mmol/L 144   POTASSIUM mmol/L 3.9   CHLORIDE mmol/L 110*   CO2 mmol/L 27   BUN mg/dL 29*    CREATININE mg/dL 0.93   ANION GAP mmol/L 7   CALCIUM mg/dL 9.0   ALBUMIN g/dL 3.8   TOTAL BILIRUBIN mg/dL 1.00   ALK PHOS U/L 106*   ALT U/L 8   AST U/L 13   GLUCOSE RANDOM mg/dL 102         Results from last 7 days   Lab Units 01/26/25  0801   POC GLUCOSE mg/dl 104     Results from last 7 days   Lab Units 01/25/25  0730   HEMOGLOBIN A1C % 5.5           Recent Cultures (last 7 days):         Imaging Results Review: I reviewed radiology reports from this admission including: CT head and xray(s).  Other Study Results Review: EKG was reviewed.     Last 24 Hours Medication List:     Current Facility-Administered Medications:     acetaminophen (TYLENOL) tablet 650 mg, Q6H PRN    Artificial Tears Op Soln 1 drop, Q4H PRN    aspirin (ECOTRIN LOW STRENGTH) EC tablet 81 mg, Daily    atorvastatin (LIPITOR) tablet 40 mg, Daily With Dinner    enoxaparin (LOVENOX) subcutaneous injection 40 mg, Daily    famotidine (PEPCID) tablet 40 mg, Daily    lisinopril (ZESTRIL) tablet 40 mg, Daily    pantoprazole (PROTONIX) EC tablet 40 mg, Early Morning    Administrative Statements   Today, Patient Was Seen By: Ines Mackey MD      **Please Note: This note may have been constructed using a voice recognition system.**

## 2025-01-28 NOTE — ASSESSMENT & PLAN NOTE
History of hypertension hyperlipidemia peripheral neuropathy who presented to the hospital with recurrent syncope  Cardiac workup thus far negative.  No events on telemetry.  Echocardiogram without valvular pathology  Orthostatic vitals negative.  Awaiting PT/OT evaluations for discharge needs  Zio patch on DC

## 2025-01-28 NOTE — PLAN OF CARE

## 2025-01-28 NOTE — DISCHARGE SUMMARY
Discharge Summary - Hospitalist   Name: Abhinav Sierra 74 y.o. male I MRN: [id number]  Unit/Bed#: E4 -01 I Date of Admission: 1/24/2025   Date of Service: 1/28/2025 I Hospital Day: 3     Assessment & Plan  Syncope and collapse  History of hypertension hyperlipidemia peripheral neuropathy who presented to the hospital with recurrent syncope  Cardiac workup thus far negative.  No events on telemetry.  Echocardiogram without valvular pathology  Orthostatic vitals negative.  Cardiology ordered Zio patch  Discharge to short-term rehab  Benign essential hypertension  Blood pressure stable continue lisinopril  Hyperlipidemia  Continue atorvastatin  Peripheral neuropathy  Follows with [facility] neurology.  Reports no longer taking gabapentin.  Avulsion fracture of middle phalanx of finger  Secondary to fall.  Seen by orthopedics.  No surgical intervention recommended, they discussed finger immobilizer but patient prefers to hold off on this  Range of motion as tolerated  PT OT as needed  Follow-up outpatient with orthopedic hand surgery     Medical Problems       Resolved Problems  Date Reviewed: 1/28/2025   None       Discharging Physician / Practitioner: Ines Mackey MD  PCP: Alexys Daniels MD  Admission Date:   Admission Orders (From admission, onward)       Ordered        01/25/25 1601  INPATIENT ADMISSION  Once            01/25/25 0250  Place in Observation  Once                          Discharge Date: 01/28/25    Consultations During Hospital Stay:  Cardiology    Procedures Performed:   None    Significant Findings / Test Results:   CT head without contrast 1/24/25  IMPRESSION:  No acute intracranial hemorrhage, midline shift, or mass effect. Redemonstrated periventricular white matter abnormality/calcification likely due to prior injury/insult.    X-ray of the left hand  IMPRESSION:  Small avulsion fracture at the volar base of the third middle phalanx  A smaller well-corticated bony density seen at the  volar base of the fourth middle phalanx may be chronic calcification alternatively nondisplaced volar plate avulsion could've similar appearance in appropriate clinical setting    Echo  Interpretation Summary  Show Result Comparison     Left Ventricle: Left ventricular cavity size is normal. There is mild concentric hypertrophy. The left ventricular ejection fraction is 68% by visual estimation. Systolic function is normal. Wall motion is normal. Diastolic function is mildly abnormal, consistent with grade I (abnormal) relaxation.    Right Ventricle: Right ventricular cavity size is normal. Systolic function is normal.    Aortic Valve: There is aortic valve sclerosis.The aortic valve mean gradient is 5 mmHg. The dimensionless velocity index is 0.79. The aortic valve area is 3.28 cm2.    Tricuspid Valve: There is trace regurgitation. Pulmonary artery systolic pressures are estimated at 22 mmHg.    There is no study for comparison.    Incidental Findings:   none       Test Results Pending at Discharge (will require follow up):   none     Outpatient Tests Requested:  none    Complications:  none    Reason for Admission: Syncope    Hospital Course:   Abhinav Sierra is a 74 y.o. male patient who originally presented to the hospital on 1/24/2025 due to recurrent syncope.  Cardiac workup was negative, no events on telemetry, echo without significant valvular pathology.  Seen by cardiology, orthostatic vitals were negative.  Cardiology ordered Zio patch on discharge.  He sustained avulsion fracture of middle phalanx of finger prior to coming to the hospital.  Seen by orthopedic surgery, no surgical intervention recommended they discussed finger immobilizer but patient prefers to hold off on it.  Range of motion as tolerated.  Outpatient follow-up with hand surgery.  Patient is stable to be discharged to short-term rehab to [facility].      Please see above list of diagnoses and related plan for additional information.  "    Condition at Discharge: good    Discharge Day Visit / Exam:   Subjective: Patient was seen evaluated bedside, denies chest pain palpitation shortness of breath.  He did not sleep well  Vitals: Blood Pressure: 116/59 (01/28/25 0732)  Pulse: 65 (01/28/25 0732)  Temperature: 97.7 °F (36.5 °C) (01/28/25 0732)  Temp Source: Temporal (01/28/25 0732)  Respirations: 18 (01/28/25 0732)  Height: 5' 10\" (177.8 cm) (01/26/25 1151)  Weight - Scale: 88.3 kg (194 lb 10.7 oz) (01/28/25 0600)  SpO2: 96 % (01/28/25 0732)  Physical Exam  Constitutional:       General: He is not in acute distress.  HENT:      Head: Atraumatic.   Cardiovascular:      Rate and Rhythm: Normal rate and regular rhythm.      Heart sounds: No murmur heard.  Pulmonary:      Effort: Pulmonary effort is normal. No respiratory distress.      Breath sounds: Normal breath sounds. No wheezing.   Abdominal:      General: Bowel sounds are normal. There is no distension.      Palpations: Abdomen is soft.      Tenderness: There is no abdominal tenderness.   Musculoskeletal:         General: No swelling.      Cervical back: Neck supple.   Skin:     General: Skin is warm and dry.   Neurological:      General: No focal deficit present.      Mental Status: He is alert.   Psychiatric:         Mood and Affect: Mood normal.          Discussion with Family: Updated  (son) via phone.    Discharge instructions/Information to patient and family:   See after visit summary for information provided to patient and family.      Provisions for Follow-Up Care:  See after visit summary for information related to follow-up care and any pertinent home health orders.      Mobility at time of Discharge:   Basic Mobility Inpatient Raw Score: 17  JH-HLM Goal: 5: Stand one or more mins  JH-HLM Achieved: 8: Walk 250 feet ot more  HLM Goal NOT achieved. Continue to encourage mobility in post discharge setting.     Disposition:   Other: Short-term rehab at [facility]    Planned " Readmission: no    Discharge Medications:  See after visit summary for reconciled discharge medications provided to patient and/or family.      Administrative Statements   Discharge Statement:  I have spent a total time of 40 minutes in caring for this patient on the day of the visit/encounter. >30 minutes of time was spent on: Counseling / Coordination of care, Documenting in the medical record, and Reviewing / ordering tests, medicine, procedures  .    **Please Note: This note may have been constructed using a voice recognition system**

## 2025-01-28 NOTE — ASSESSMENT & PLAN NOTE
Secondary to fall.  Seen by orthopedics.  No surgical intervention recommended, they discussed finger immobilizer but patient prefers to hold off on this  Range of motion as tolerated  PT OT as needed  Follow-up outpatient with orthopedic hand surgery

## 2025-01-28 NOTE — PROGRESS NOTES
Patient:    MRN:  [id number]    Lester Request ID:  [id number]    Level of care reserved:  Skilled Nursing Facility    Partner Reserved:  [facility], [place] [id number] [phone]    Clinical needs requested:    Geography searched:  10 miles around [id number]    Start of Service:    Request sent:  3:35pm EST on 1/27/2025 by Bonnie Moreno    Partner reserved:  1:34pm EST on 1/28/2025 by Bonnie Moreno    Choice list shared:  10:21am EST on 1/28/2025 by Bonnie Moreno

## 2025-01-28 NOTE — PLAN OF CARE

## 2025-01-28 NOTE — CASE MANAGEMENT
Case Management Discharge Planning Note    Patient name Abhinav Sierra  Location East 4 /E4 -* MRN [id number]  : 1950 Date 2025       Current Admission Date: 2025  Current Admission Diagnosis:Syncope and collapse   Patient Active Problem List    Diagnosis Date Noted Date Diagnosed    Syncope and collapse 2025     Avulsion fracture of middle phalanx of finger 2025     Bilateral lower extremity edema 2024     Chronic pain syndrome 2024     Gastroesophageal reflux disease without esophagitis 2023     Family history of colon cancer 2023     Continuous opioid dependence (HCC) 2021     Hereditary and idiopathic peripheral neuropathy 2020     Hyperglycemia 2019     BPH with obstruction/lower urinary tract symptoms 05/15/2019     Morbid (severe) obesity due to excess calories (HCC) 2019     Pre-diabetes 2019     Coronary artery disease involving native coronary artery of native heart without angina pectoris 10/30/2018     Acute idiopathic gout of left wrist 2018     Urinary urgency 2017     Gait disturbance 2016     S/P cervical spinal fusion 2016     Gait disorder 2016     Chronic pain disorder 2016     Cervical stenosis of spinal canal 2016     Cervical radicular pain 2016     Cervical radiculopathy 2016     Incomplete tear of rotator cuff 2016     Arthropathy of knee 03/15/2016     Hyperlipidemia 10/15/2015     Brachial radiculitis 2015     Cervical spondylosis with myelopathy 2015     Lumbosacral radiculitis 2015     Vitamin B deficiency 2015     Vitamin D deficiency 2015     Peripheral neuropathy 2015     Lumbar stenosis 2015     Benign essential hypertension 2013       LOS (days): 3  Geometric Mean LOS (GMLOS) (days): 2.3  Days to GMLOS:-0.5     OBJECTIVE:  Risk of Unplanned Readmission Score: 14.62         Current  admission status: Inpatient   Preferred Pharmacy:   [id number] - [place] - [address]  [address]  [place] [address]  Phone: [phone] Fax: [phone]    Primary Care Provider: Alexys Daniels MD    Primary Insurance: MEDICARE  Secondary Insurance: AARP    DISCHARGE DETAILS:    Discharge planning discussed with:: Patient and Son  Freedom of Choice: Yes  Comments - Freedom of Choice: Choice list provided- family reviewing  CM contacted family/caregiver?: Yes  Were Treatment Team discharge recommendations reviewed with patient/caregiver?: Yes  Did patient/caregiver verbalize understanding of patient care needs?: N/A- going to facility  Were patient/caregiver advised of the risks associated with not following Treatment Team discharge recommendations?: Yes    Contacts  Patient Contacts: Km Sierra (Son)   [phone] (H)  Relationship to Patient:: Family  Contact Method: Phone  Phone Number: Km Sierra (Son)   [phone] (X)  Reason/Outcome: Continuity of Care, Emergency Contact, Discharge Planning    Requested Home Health Care         Is the patient interested in HHC at discharge?: No    DME Referral Provided  Referral made for DME?: No    Other Referral/Resources/Interventions Provided:  Interventions: Short Term Rehab  Referral Comments: Choice list provided, family reviewing    Would you like to participate in our [facility] service program?  : No - Declined    Treatment Team Recommendation: Short Term Rehab  Discharge Destination Plan:: Short Term Rehab                                IMM Given (Date):: 01/28/25  IMM Given to:: Patient          IMM reviewed with patient, patient agrees with discharge determination.    CM met with patient at bedside to review choice list for STR.  Patient leaning towards [facility] or [facility], would like son to make decision.    CM spoke with patient's son and paramour via PC, choice list provided.  Family will review and  make a decision early this afternoon.  CM will continue to follow.

## 2025-01-28 NOTE — NURSING NOTE
Patient discharged to [facility] . IV removed. Tried calling report to receiving facility. Receiving facility phone number not working. All belongings were taken.

## 2025-01-28 NOTE — CASE MANAGEMENT
Case Management Discharge Planning Note    Patient name Abhinav Sirera  Location East 4 /E4 -* MRN [id number]  : 1950 Date 2025       Current Admission Date: 2025  Current Admission Diagnosis:Syncope and collapse   Patient Active Problem List    Diagnosis Date Noted Date Diagnosed    Syncope and collapse 2025     Avulsion fracture of middle phalanx of finger 2025     Bilateral lower extremity edema 2024     Chronic pain syndrome 2024     Gastroesophageal reflux disease without esophagitis 2023     Family history of colon cancer 2023     Continuous opioid dependence (HCC) 2021     Hereditary and idiopathic peripheral neuropathy 2020     Hyperglycemia 2019     BPH with obstruction/lower urinary tract symptoms 05/15/2019     Morbid (severe) obesity due to excess calories (HCC) 2019     Pre-diabetes 2019     Coronary artery disease involving native coronary artery of native heart without angina pectoris 10/30/2018     Acute idiopathic gout of left wrist 2018     Urinary urgency 2017     Gait disturbance 2016     S/P cervical spinal fusion 2016     Gait disorder 2016     Chronic pain disorder 2016     Cervical stenosis of spinal canal 2016     Cervical radicular pain 2016     Cervical radiculopathy 2016     Incomplete tear of rotator cuff 2016     Arthropathy of knee 03/15/2016     Hyperlipidemia 10/15/2015     Brachial radiculitis 2015     Cervical spondylosis with myelopathy 2015     Lumbosacral radiculitis 2015     Vitamin B deficiency 2015     Vitamin D deficiency 2015     Peripheral neuropathy 2015     Lumbar stenosis 2015     Benign essential hypertension 2013       LOS (days): 3  Geometric Mean LOS (GMLOS) (days): 2.3  Days to GMLOS:-0.6     OBJECTIVE:  Risk of Unplanned Readmission Score: 14.5         Current  admission status: Inpatient   Preferred Pharmacy:   [id number] - [place] - [address]  [address]  [place] [address]  Phone: [phone] Fax: [phone]    Primary Care Provider: Alexys Daniels MD    Primary Insurance: MEDICARE  Secondary Insurance: AARP    DISCHARGE DETAILS:    Discharge planning discussed with:: Patient and son  Freedom of Choice: Yes  Comments - Freedom of Choice: [place]  CM contacted family/caregiver?: Yes  Were Treatment Team discharge recommendations reviewed with patient/caregiver?: Yes  Did patient/caregiver verbalize understanding of patient care needs?: N/A- going to facility  Were patient/caregiver advised of the risks associated with not following Treatment Team discharge recommendations?: Yes    Contacts  Patient Contacts: Km Sierra (Son)   [phone] (K)  Relationship to Patient:: Family  Contact Method: Phone  Phone Number: Km Sierra (Son)   [phone] (F)  Reason/Outcome: Continuity of Care, Emergency Contact, Discharge Planning    Requested Home Health Care         Is the patient interested in HHC at discharge?: No    DME Referral Provided  Referral made for DME?: No    Other Referral/Resources/Interventions Provided:  Interventions: Short Term Rehab  Referral Comments: [place]    Would you like to participate in our [facility] service program?  : No - Declined    Treatment Team Recommendation: Short Term Rehab  Discharge Destination Plan:: Short Term Rehab  Transport at Discharge : Wheelchair van     Number/Name of Dispatcher: Roundtrip  Transported by (Company and Unit #): [facility]   ETA of Transport (Date): 01/28/25 at 4:30pm                               [facility]  Receiving Facility/Agency Phone Number: [phone]  Facility/Agency Fax Number: [phone] fax       CM met with patient at bedside with son on speaker phone, patient would  prefer STR at [facility] if private room still available.  CM confirmed with [facility], they can accept him today.     CM requested [facility] transport via [employer].  Discussed with Patient  there may be an out of pocket expense for transport. [employer] [facility] claimed the ride for 1.28.25 at 4:30pm. CM notified SLIM, facility, RN, and patient.

## 2025-01-28 NOTE — ASSESSMENT & PLAN NOTE
Follows with [facility] neurology.  Reports no longer taking gabapentin.

## 2025-01-29 ENCOUNTER — PATIENT OUTREACH (OUTPATIENT)
Dept: CASE MANAGEMENT | Facility: OTHER | Age: 75
End: 2025-01-29

## 2025-01-29 ENCOUNTER — TELEPHONE (OUTPATIENT)
Dept: CARDIOLOGY CLINIC | Facility: CLINIC | Age: 75
End: 2025-01-29

## 2025-01-29 ENCOUNTER — TRANSITIONAL CARE MANAGEMENT (OUTPATIENT)
Age: 75
End: 2025-01-29

## 2025-01-29 NOTE — PROGRESS NOTES
Outpatient care management referral via HRR report 1/29/25. Discharged to [facility] 1/28/25. Email sent to facility to inform them I will be following the patient during their skilled stay.  This Admin Coordinator will continue to monitor via chart review.

## 2025-01-29 NOTE — TELEPHONE ENCOUNTER
----- Message from Clara Jordan PA-C sent at 1/27/2025 11:09 AM EST -----  Hi,     Clinical team: please call the pt and help him set up Zio    Clerical team: please call the pt and set him up for hospital follow up in one month. He will be new to the practice my myself and gamboa in the hospital.    Thanks,     Clara

## 2025-01-29 NOTE — TELEPHONE ENCOUNTER
Contact was made with Mr. Sierra and the patient states that [facility]. The patient states that he feels like he has to much on his plate at the moment and would like to hold off on wearing the zio patch. Patient states that he is not sure yet if he is going to be able to make his appointment with [facility] for February 17th.

## 2025-02-04 ENCOUNTER — PATIENT OUTREACH (OUTPATIENT)
Dept: CASE MANAGEMENT | Facility: OTHER | Age: 75
End: 2025-02-04

## 2025-02-06 ENCOUNTER — TELEPHONE (OUTPATIENT)
Age: 75
End: 2025-02-06

## 2025-02-06 NOTE — TELEPHONE ENCOUNTER
Konstantin from [facility] in [place] called and stated patient will be discharging tomorrow and they need to set patient up with a TCM visit with their pcp prior to discharge. Unfortunately at this time I was unable to connect with office clinical. Please return Hema call at [phone] to schedule patient a TCM.

## 2025-02-07 ENCOUNTER — TRANSITIONAL CARE MANAGEMENT (OUTPATIENT)
Age: 75
End: 2025-02-07

## 2025-02-10 ENCOUNTER — PATIENT OUTREACH (OUTPATIENT)
Dept: FAMILY MEDICINE CLINIC | Facility: CLINIC | Age: 75
End: 2025-02-10

## 2025-02-10 DIAGNOSIS — Z71.89 COMPLEX CARE COORDINATION: Primary | ICD-10-CM

## 2025-02-10 NOTE — PROGRESS NOTES
Update obtained from PCC the patient discharged 2/7/25 to Home. I have removed myself off of the care team and sent a inbasket to the appropriate care  to notifying them of the SNF discharge and HRR Referal.  Ambulatory referral placed for complex care management.  A email was sent to the facility requesting discharge instructions. When Admin Coordinator has received the Discharge paperwork  Admin Coordinator will attach to this encounter.

## 2025-02-14 ENCOUNTER — OFFICE VISIT (OUTPATIENT)
Age: 75
End: 2025-02-14
Payer: MEDICARE

## 2025-02-14 VITALS
DIASTOLIC BLOOD PRESSURE: 60 MMHG | RESPIRATION RATE: 16 BRPM | SYSTOLIC BLOOD PRESSURE: 132 MMHG | BODY MASS INDEX: 27.63 KG/M2 | OXYGEN SATURATION: 98 % | WEIGHT: 193 LBS | HEART RATE: 98 BPM | HEIGHT: 70 IN | TEMPERATURE: 99 F

## 2025-02-14 DIAGNOSIS — R55 SYNCOPE AND COLLAPSE: Primary | ICD-10-CM

## 2025-02-14 DIAGNOSIS — G47.00 INSOMNIA, UNSPECIFIED TYPE: ICD-10-CM

## 2025-02-14 PROCEDURE — 99214 OFFICE O/P EST MOD 30 MIN: CPT | Performed by: FAMILY MEDICINE

## 2025-02-14 PROCEDURE — G2211 COMPLEX E/M VISIT ADD ON: HCPCS | Performed by: FAMILY MEDICINE

## 2025-02-14 RX ORDER — TRAZODONE HYDROCHLORIDE 50 MG/1
50 TABLET, FILM COATED ORAL
Qty: 30 TABLET | Refills: 1 | Status: SHIPPED | OUTPATIENT
Start: 2025-02-14 | End: 2025-02-20

## 2025-02-17 DIAGNOSIS — E78.5 HYPERLIPIDEMIA, UNSPECIFIED HYPERLIPIDEMIA TYPE: ICD-10-CM

## 2025-02-18 ENCOUNTER — PATIENT OUTREACH (OUTPATIENT)
Dept: CASE MANAGEMENT | Facility: OTHER | Age: 75
End: 2025-02-18

## 2025-02-18 PROBLEM — G47.00 INSOMNIA: Status: ACTIVE | Noted: 2025-02-18

## 2025-02-18 NOTE — PROGRESS NOTES
"Name: Abhinav Sierra      : 1950      MRN: [id number]  Encounter Provider: Alexys Daniels MD  Encounter Date: 2025   Encounter department: [facility] PRIMARY CARE  :  Assessment & Plan  Syncope and collapse  Discussed supportive care and return parameters.        Insomnia, unspecified type  Will begin trial of Trazodone. Discussed risks and benefits of treatment, pt acknowledges risks. Discussed supportive care and return parameters.   Orders:    traZODone (DESYREL) 50 mg tablet; Take 1 tablet (50 mg total) by mouth daily at bedtime as needed for sleep           History of Present Illness   Patient is a 73 y/o male who presents for follow-up on syncope, collapse and insomnia, admits he is feeling better since being in the hospital no fevers chills nausea or vomiting.      Review of Systems   Constitutional: Negative.    HENT: Negative.     Eyes: Negative.    Respiratory: Negative.     Cardiovascular: Negative.    Gastrointestinal: Negative.    Endocrine: Negative.    Genitourinary: Negative.    Musculoskeletal: Negative.    Allergic/Immunologic: Negative.    Neurological: Negative.    Hematological: Negative.    Psychiatric/Behavioral: Negative.     All other systems reviewed and are negative.      Objective   /60 (BP Location: Left arm, Patient Position: Sitting, Cuff Size: Large)   Pulse 98   Temp 99 °F (37.2 °C) (Temporal)   Resp 16   Ht 5' 10\" (1.778 m)   Wt 87.5 kg (193 lb)   SpO2 98%   BMI 27.69 kg/m²      Physical Exam  Vitals reviewed.   Constitutional:       General: He is not in acute distress.     Appearance: He is well-developed. He is not diaphoretic.   HENT:      Head: Normocephalic and atraumatic.      Right Ear: External ear normal.      Left Ear: External ear normal.      Nose: Nose normal.   Eyes:      General: No scleral icterus.        Right eye: No discharge.         Left eye: No discharge.      Conjunctiva/sclera: Conjunctivae normal.      Pupils: " Pupils are equal, round, and reactive to light.   Neck:      Thyroid: No thyromegaly.      Trachea: No tracheal deviation.   Cardiovascular:      Rate and Rhythm: Normal rate and regular rhythm.      Heart sounds: Normal heart sounds. No murmur heard.     No friction rub.   Pulmonary:      Effort: Pulmonary effort is normal. No respiratory distress.      Breath sounds: Normal breath sounds. No stridor. No wheezing or rales.   Abdominal:      General: There is no distension.      Palpations: Abdomen is soft. There is no mass.      Tenderness: There is no abdominal tenderness. There is no guarding or rebound.   Musculoskeletal:         General: Normal range of motion.      Cervical back: Normal range of motion and neck supple.   Lymphadenopathy:      Cervical: No cervical adenopathy.   Skin:     General: Skin is warm.   Neurological:      Mental Status: He is alert and oriented to person, place, and time.      Cranial Nerves: No cranial nerve deficit.   Psychiatric:         Behavior: Behavior normal.         Thought Content: Thought content normal.         Judgment: Judgment normal.

## 2025-02-18 NOTE — ASSESSMENT & PLAN NOTE
Will begin trial of Trazodone. Discussed risks and benefits of treatment, pt acknowledges risks. Discussed supportive care and return parameters.   Orders:    traZODone (DESYREL) 50 mg tablet; Take 1 tablet (50 mg total) by mouth daily at bedtime as needed for sleep

## 2025-02-18 NOTE — TELEPHONE ENCOUNTER
Reason for call:   [x] Refill   [] Prior Auth  [] Other:     Office:   [x] PCP/Provider - Priti SCANLON/  Alexys Daniels MD  [] Specialty/Provider -     atorvastatin (LIPITOR) 40 mg tablet  TAKE ONE TABLET BY MOUTH ONCE DAILY     lisinopril (ZESTRIL) 40 mg tablet TAKE ONE TABLET BY MOUTH ONCE DAILY     Pharmacy: [id number] - [place] - [address]     Does the patient have enough for 3 days?   [x] Yes   [] No - Send as HP to POD

## 2025-02-19 ENCOUNTER — PATIENT OUTREACH (OUTPATIENT)
Dept: CASE MANAGEMENT | Facility: OTHER | Age: 75
End: 2025-02-19

## 2025-02-19 RX ORDER — LISINOPRIL 40 MG/1
40 TABLET ORAL DAILY
Qty: 90 TABLET | Refills: 1 | Status: SHIPPED | OUTPATIENT
Start: 2025-02-19

## 2025-02-19 RX ORDER — ATORVASTATIN CALCIUM 40 MG/1
40 TABLET, FILM COATED ORAL DAILY
Qty: 90 TABLET | Refills: 1 | Status: SHIPPED | OUTPATIENT
Start: 2025-02-19

## 2025-02-19 NOTE — PROGRESS NOTES
Spoke with aleshia Reports he has lightheadedness at different time during the day  Working with home PT thru [facility]  Aleshia would like me to reschedule his cardiology office, will do so and call him back  Aleshia reports he is taking Magnesium 250mg daily , Percocet 5/325mg only takes sparingly for arthritic pain and Potassium chloride 8meq daily. These three medications are not on his list I will let PCP know he is taking them  Not driving his brother, son and neighbor help with food and medications.

## 2025-02-19 NOTE — PROGRESS NOTES
Spoke with cardiology office appointment made for 3/13/25 at 1pm.     Spoke with Abhinav and provided him information for cardiology appointment.

## 2025-02-20 ENCOUNTER — TELEPHONE (OUTPATIENT)
Age: 75
End: 2025-02-20

## 2025-02-20 DIAGNOSIS — G47.00 INSOMNIA, UNSPECIFIED TYPE: Primary | ICD-10-CM

## 2025-02-20 RX ORDER — DOXEPIN 3 MG/1
3 TABLET, FILM COATED ORAL
Qty: 30 TABLET | Refills: 0 | Status: SHIPPED | OUTPATIENT
Start: 2025-02-20

## 2025-02-20 NOTE — TELEPHONE ENCOUNTER
Prescription for doxepin 3 mg nightly as needed sent to pharmacy.  I do recommend patient follow-up with Dr. Daniels upon his return.

## 2025-02-20 NOTE — TELEPHONE ENCOUNTER
Called and spoke with pt informed him of medication that was sent to the pharmacy and dr galdamez

## 2025-02-20 NOTE — TELEPHONE ENCOUNTER
Patient reports that his Trazodone was causing him to have light dizziness so he stopped taking it. He took Melatonin and Banophen in the past and they did not work for him either. He would like another medication ordered that will not interact with his current meds and a call back to advise.

## 2025-02-27 ENCOUNTER — PATIENT OUTREACH (OUTPATIENT)
Dept: CASE MANAGEMENT | Facility: OTHER | Age: 75
End: 2025-02-27

## 2025-02-27 ENCOUNTER — TELEPHONE (OUTPATIENT)
Age: 75
End: 2025-02-27

## 2025-02-27 NOTE — PROGRESS NOTES
Spoke with Abhinav reports he is still having episodes of dizziness and lightheadedness   Did not take Doxepin medication because he was worried about side effects   Abhinav would also like to know if cardiology received results of his Zio patch I will send message to office to reach out to Abhinav.  Drinking 60 ounces or more a day of fluids water and Gatorade. Some days drinks more Gatorade than other days  Home health nurse will come today. She always says his blood pressure is good when she takes it  No falls since coming home  Not taking Magnesium at bedtime, but will start  Has not taken percocet in a few days and only takes it when he is having arthritic or neuropathy pain  I will check back again

## 2025-02-27 NOTE — TELEPHONE ENCOUNTER
Patient called, asked if results of the 14 day Zio are in.  He has a follow up with July on 3/13 but asked if results are known.  He said [facility] but [facility] now [facility]. He has home care nurses coming in who are monitoring his BP and HR.  He said he sometimes feels unsteady but nurse is monitoring VS.

## 2025-03-06 ENCOUNTER — PATIENT OUTREACH (OUTPATIENT)
Dept: CASE MANAGEMENT | Facility: OTHER | Age: 75
End: 2025-03-06

## 2025-03-06 NOTE — PROGRESS NOTES
7/22/2021  Facilitator(s): Marichuy Aparicio LPC    Method: Group  Attendance: Present  Participation: Active  Patient report of any safety concerns: No  Physical concerns reported: No  Drugs/alcohol reported:No  Taking medications as prescribed:Yes  Mood: Appropriate  Affect: Normal  Thought Process: Congruent  Perceptual Problems: None  Speech: Clear/articulate  Behavior/Socialization: Appropriate to Group  Task Performance: Follows directions  Patient Response: Attentive, Appropriate feedback, and Interested in topic and Interactive      Group Topic: Therapeutic Activity  Upon arriving to group, pts participated in self-led activities and group-appropriate conversations before transitioning into our psychoeducational activity for the morning. Pts were to practice appropriate social skills while following all group rules and expectations. Pt was cooperative and compliant toward peers and expectations during this time.    Start Time: 830  End Time: 900  Number of group participants: 6      Group Topic: Education Group  Group members participated in a group discussion and activity on positive self-talk/affirmations. We defined what positive affirmations are and described their purpose. Pt's then watched a brief video clip that demonstrated practicing positive self-talk during challenging times. This was followed by a discussion that highlighted examples of affirmations one could recite in an attempt to improve the quality of their self-talk. Pt's created their own positive affirmation \"minibooks\" to promote/emphasize the importance of incorporating affirmations. Pt initially needed encouragement to participate and put effort into her work, but she was ultimately able to be on task and become involved. She put thoughtful effort into the activity and was willing to share her ideas during our discussion. Pt met activity expectations.    Start Time: 900  End Time: 1015  Number of group participants: 6      Group Topic:  Spoke with Abhinav he is still having dizziness and lilghtheadness same as last time we spoke.   Visiting nurse completed her care last week. PT is still working with him  Uses Davin walker Brother will take him to appointment next week   He uses a stationary pedal bike for his legs.   Nocturia 3-4 times a day. Also uses compression stockings  I will call back after cardiology appointment tomorrow     Process Group  Pts participated in process group. Pt was encouraged to reflect on her own emotional regulation as she shared typically hiding her emotions. She explained feeling worried about others spreading rumors about her if she does open up about her experiences and feelings. Pt was however able to connect how building up her emotions tends to lead to suicidal ideation... but once we began to explore this she was quick to request moving onto the next person and put her head on her desk. She was praised for her involvement but was challenged to consider how being vulnerable is necessary for her progress. Pt denied any safety concerns upon dismissal.    Start Time: 1030  End Time: 1130  Number of group participants: 6

## 2025-03-12 NOTE — PROGRESS NOTES
Cardiology Follow Up    [facility] [address] [address]  PHONE: [phone]  FAX: [phone]    Abhinav Sierra  1950  [id number]    Assessment/Plan:  Syncope and collapse likely orthostatic  Hypertension  Dyslipidemia  Prediabetes  GERD    He is staying hydrated and wearing compression stockings. Still with dizziness & LH sxs monae moving the head side to side quickly or standing up quickly. No syncope. I will ask clinical staff to contact his [facility] home PT to see if he can help with vestibular therapy maneuvers or if the pt has to be referred elsewhere for that. Might benefit from vestibular therapy.    Home PT has been measuring BP and according to the pt it is averaging -130 mmHg. Today it is low likely because he only drank Gatorade and had 2 cookies so far today.    Goal /80 mmHg. PCP can consider decreasing Lisinopril if necessary if he is averaging very much below his goal.    Zio patch was ordered to rule out arrhythmogenic cause for syncope earlier this year.  Result reviewed. No arrhythmia. No need for long term monitor.    Interval History:   Abhinav Sierra is a 74 y.o. male with past medical history as below who presents to the office for follow-up after hospitalization in January 2025 for syncope and collapse.  Evaluated by cardiology including telemetry monitoring, echocardiogram and vital sign monitoring.  Probably orthostatic hypotension causing syncope.  No medication changes were made prior to discharge to home.  Since his discharge to home patient reports he is still struggling with dizziness and lightheadedness every day, comes and goes, multiple times a day.  He has neuropathy in his balance stinks.  He thinks his balance is actually getting worse now.  For example if he is trying to put a T-shirt on, once he gets into bed and messes with the covers and lays flat he feels it for 30 seconds.  He has fear of driving  "because moving his head to watch the cars or look behind him would make him feel dizzy and lightheaded.  He is really pushing water and he has history of overactive bladder so he is urinating frequently.  He fears getting out of bed at night in case of a fall so he has been urinating in the plastic urinal overnight.    Past Medical History:   Diagnosis Date    Arthritis     Coronary artery disease     Hypertension     Obesity       Past Surgical History:   Procedure Laterality Date    CERVICAL DISCECTOMY      KNEE SURGERY      SPINE SURGERY        Family History   Problem Relation Age of Onset    Cancer Father     Cancer Mother     Dementia Mother       Current Outpatient Medications:     Acetaminophen (TYLENOL ARTHRITIS PAIN PO), Take by mouth daily, Disp: , Rfl:     aspirin (ECOTRIN LOW STRENGTH) 81 mg EC tablet, Take 81 mg by mouth daily, Disp: , Rfl:     atorvastatin (LIPITOR) 40 mg tablet, TAKE 1 TABLET BY MOUTH ONCE DAILY, Disp: 90 tablet, Rfl: 1    Cholecalciferol (VITAMIN D-3) 5000 units TABS, take 1 tablet daily, Disp: , Rfl:     cyanocobalamin (VITAMIN B-12) 1000 MCG tablet, Take 1,000 mcg by mouth daily, Disp: , Rfl:     lisinopril (ZESTRIL) 40 mg tablet, TAKE 1 TABLET BY MOUTH ONCE DAILY, Disp: 90 tablet, Rfl: 1    nystatin-triamcinolone (MYCOLOG-II) ointment, Apply topically 2 (two) times a day, Disp: 30 g, Rfl: 2    pantoprazole (PROTONIX) 40 mg tablet, TAKE 1 TABLET BY MOUTH EVERY DAY IN THE MORNING BEFORE BREAKFAST., Disp: 90 tablet, Rfl: 1    potassium chloride (KLOR-CON) 8 MEQ tablet, TAKE 1 TABLET BY MOUTH EVERY DAY IN THE MORNING., Disp: 90 tablet, Rfl: 0     Allergies   Allergen Reactions    Ciprofloxacin GI Intolerance    Cephalexin     Oxybutynin GI Intolerance    Tiotropium Bromide Monohydrate GI Intolerance     Physical Exam:  Vitals:    03/13/25 1304   BP: 104/60   Pulse: 88     Vitals:    03/13/25 1304   Weight: 86.6 kg (191 lb)     Height: 5' 10\" (177.8 cm) Body mass index is 27.41 " kg/m².    Physical Exam  Vitals and nursing note reviewed.   Constitutional:       General: He is not in acute distress.     Appearance: He is not ill-appearing.      Comments: Gait is unsteady this is chronic uses rollator now - used to use 2 canes   HENT:      Head: Normocephalic and atraumatic.      Nose: No congestion.      Mouth/Throat:      Mouth: Mucous membranes are moist.   Eyes:      Conjunctiva/sclera: Conjunctivae normal.   Neck:      Vascular: No carotid bruit.   Cardiovascular:      Rate and Rhythm: Normal rate and regular rhythm.      Heart sounds: S1 normal and S2 normal. No murmur heard.  Pulmonary:      Effort: Pulmonary effort is normal.      Breath sounds: No wheezing, rhonchi or rales.   Abdominal:      General: Abdomen is flat.   Musculoskeletal:         General: No swelling.   Skin:     General: Skin is warm and dry.   Neurological:      Mental Status: He is alert and oriented to person, place, and time.      Comments: - tremor   Psychiatric:         Behavior: Behavior normal.     Data:  ECHO: Jan 2025 LVEF 68%, mild LVH, grade 1 diastolic dysfunction, AV sclerosis, trace TR with PASP 22 mmHg     Zio patch: 1/30-2/13/25 Patient had a min HR of 39 bpm, max HR of 138 bpm, and avg HR of 78 bpm. Predominant underlying rhythm was Sinus Rhythm. 11 Supraventricular Tachycardia runs occurred, the run with the fastest interval lasting 4 beats with a max rate of 138 bpm, the longest lasting 9 beats with an avg rate of 103 bpm. No heart block. No pauses. The bradycardia occurred overnight, not during waking hours.    July Quinn PA-C  [facility]

## 2025-03-13 ENCOUNTER — OFFICE VISIT (OUTPATIENT)
Dept: CARDIOLOGY CLINIC | Facility: CLINIC | Age: 75
End: 2025-03-13

## 2025-03-13 VITALS
HEIGHT: 70 IN | SYSTOLIC BLOOD PRESSURE: 104 MMHG | DIASTOLIC BLOOD PRESSURE: 60 MMHG | HEART RATE: 88 BPM | BODY MASS INDEX: 27.35 KG/M2 | WEIGHT: 191 LBS

## 2025-03-13 DIAGNOSIS — I25.10 CORONARY ARTERY DISEASE INVOLVING NATIVE CORONARY ARTERY OF NATIVE HEART WITHOUT ANGINA PECTORIS: Primary | ICD-10-CM

## 2025-03-13 DIAGNOSIS — I10 BENIGN ESSENTIAL HYPERTENSION: ICD-10-CM

## 2025-03-13 DIAGNOSIS — K21.9 GASTROESOPHAGEAL REFLUX DISEASE WITHOUT ESOPHAGITIS: ICD-10-CM

## 2025-03-14 ENCOUNTER — TELEPHONE (OUTPATIENT)
Dept: CARDIOLOGY CLINIC | Facility: CLINIC | Age: 75
End: 2025-03-14

## 2025-03-14 ENCOUNTER — PATIENT OUTREACH (OUTPATIENT)
Dept: CASE MANAGEMENT | Facility: OTHER | Age: 75
End: 2025-03-14

## 2025-03-14 NOTE — TELEPHONE ENCOUNTER
CTS can Discuss.    Attempted to reach Bautista at [facility] and there was no answer. Left voicemail explaining what July Quinn PA-C would like to have done. Asked him to please return office phone call.

## 2025-03-14 NOTE — TELEPHONE ENCOUNTER
Bautista, PT with [facility] calling back. He is not certified in vestibular therapy, but they will have another Physical Therapist see the patient next week who is experienced with vestibular therapy.

## 2025-03-14 NOTE — PROGRESS NOTES
Spoke with Abhinav to let him know about [facility]. He will keep [place] until he hears further. [facility]

## 2025-03-14 NOTE — TELEPHONE ENCOUNTER
----- Message from July Quinn PA-C sent at 3/13/2025  3:18 PM EDT -----  Regarding: vestibular therapy  Hi, can you please call his [facility] physical therapist, Bautista [phone] to see if he can help with vestibular therapy maneuvers to treat vertigo to see if it will help wanes dizziness.  If Bautista is not trained in vestibular therapy, I will have to place a separate order for vestibular physical therapy for Abhinav.  Please let me know thank you        July

## 2025-03-14 NOTE — PROGRESS NOTES
Emailed Jose Raul at [facility] they do not have Vestibular specialists but have therapists who can do the Epley maneuver and are able to help patients with their vestibular issues.

## 2025-03-14 NOTE — PROGRESS NOTES
Spoke with Abhinav he saw cardiologist yesterday they are going to try vestibular therapy to see if that helps Abhinav with his lightheadedness and dizziness. No medication changes  I will check with [facility] to see if they vestibular therapy.

## 2025-03-17 ENCOUNTER — PATIENT OUTREACH (OUTPATIENT)
Dept: CASE MANAGEMENT | Facility: OTHER | Age: 75
End: 2025-03-17

## 2025-03-17 NOTE — TELEPHONE ENCOUNTER
Received call from Jose Raul at [facility] following up on home PT for vestibular therapy.  Relayed last message from Bautista at [facility] that they would be sending another provider to work with the pt this week.  Jose Raul will follow up if there are any other concerns.

## 2025-03-17 NOTE — PROGRESS NOTES
Received incoming email from Jose Raul from [facility] that she spoke with cardiology office and [facility] said they would be providing a therapist out to Abhinav's home this week for vestibular therapy   I will check with Abhinav to see if he is aware of this

## 2025-03-17 NOTE — PROGRESS NOTES
Spoke with Abhinav to let him know that [facility] is working on getting him a PT to do vestibular therapy. I will check back later in week to see if one has come out. If there is an issue we can always reach out to [facility] and have them start care.

## 2025-03-21 ENCOUNTER — PATIENT OUTREACH (OUTPATIENT)
Dept: CASE MANAGEMENT | Facility: OTHER | Age: 75
End: 2025-03-21

## 2025-03-21 NOTE — PROGRESS NOTES
Spoke with Abhinav he had his first vestibular PT session yesterday. He feels a little woozy today. He has decided to take it easy. He is staying hydrated and blood pressure when PT was there was 118/60. He will have another session next week. Abhinav is hoping for good results in regards to PT. I will check back next week

## 2025-03-25 ENCOUNTER — TELEPHONE (OUTPATIENT)
Age: 75
End: 2025-03-25

## 2025-03-25 DIAGNOSIS — R42 VERTIGO: Primary | ICD-10-CM

## 2025-03-25 NOTE — TELEPHONE ENCOUNTER
Order has been pended for ent please review and sign. Please order Meclizine with dose and directions for patient as it is a new script.    Thank you

## 2025-03-25 NOTE — TELEPHONE ENCOUNTER
[facility] PT describes patient having dizzy spells, BP good, Holter monitor good. Patient having a hard time describing it but it seems to be dizzy/lightheadedness. Asking for a referral to ENT and a trial of Meclizine to see if that helps patient.

## 2025-03-26 ENCOUNTER — TELEPHONE (OUTPATIENT)
Age: 75
End: 2025-03-26

## 2025-03-26 RX ORDER — MECLIZINE HCL 12.5 MG 12.5 MG/1
12.5 TABLET ORAL 3 TIMES DAILY PRN
Qty: 30 TABLET | Refills: 0 | Status: SHIPPED | OUTPATIENT
Start: 2025-03-26

## 2025-03-26 NOTE — TELEPHONE ENCOUNTER
Patient called for an ENT appointment. Provided address and phone number for [facility] [place] [facility] [place]. Call dropped. Called back and left a detailed voicemail with instructions to call [facility] for an appointment.

## 2025-03-26 NOTE — TELEPHONE ENCOUNTER
PT called and provided him with the [facility] number and informed him to contact his insurance provider to double check- PT asked to reach out to [facility] in [place] for his insurance which I did to confirm for his insurance and during the process, PT hung up the phone

## 2025-03-27 ENCOUNTER — PATIENT OUTREACH (OUTPATIENT)
Dept: CASE MANAGEMENT | Facility: OTHER | Age: 75
End: 2025-03-27

## 2025-03-27 NOTE — PROGRESS NOTES
Abhinav returned my call. He continues to work with PT. They called PCP office to get ENT appointment and meclizine ordered. Abhinav took first dose of meclizine at 2pm.   On 3/25/25 blood pressure 120/60 pulse74 pulse ox 98%  I will check back after ENT appointment on 4/17/25

## 2025-03-31 ENCOUNTER — RA CDI HCC (OUTPATIENT)
Dept: OTHER | Facility: HOSPITAL | Age: 75
End: 2025-03-31

## 2025-03-31 DIAGNOSIS — R42 VERTIGO: ICD-10-CM

## 2025-03-31 NOTE — TELEPHONE ENCOUNTER
Reason for call:   [x] Refill   [] Prior Auth  [] Other:     Office: [facility]   [x] PCP/Provider - Alexys Daniels   [] Specialty/Provider -     Medication: meclizine     Dose/Frequency: 12.5 mg/ 1 tab 3 times daily PRN    Quantity: 90 day supply     Pharmacy: [facility]   Does the patient have enough for 3 days?   [x] Yes   [] No - Send as HP to POD    Mail Away Pharmacy   Does the patient have enough for 10 days?   [] Yes   [] No - Send as HP to POD

## 2025-04-01 RX ORDER — MECLIZINE HCL 12.5 MG 12.5 MG/1
12.5 TABLET ORAL 3 TIMES DAILY PRN
Qty: 270 TABLET | Refills: 0 | Status: SHIPPED | OUTPATIENT
Start: 2025-04-01 | End: 2025-04-08

## 2025-04-08 ENCOUNTER — TELEPHONE (OUTPATIENT)
Age: 75
End: 2025-04-08

## 2025-04-08 ENCOUNTER — OFFICE VISIT (OUTPATIENT)
Age: 75
End: 2025-04-08
Payer: MEDICARE

## 2025-04-08 VITALS
HEART RATE: 88 BPM | TEMPERATURE: 98.5 F | HEIGHT: 70 IN | OXYGEN SATURATION: 98 % | SYSTOLIC BLOOD PRESSURE: 102 MMHG | DIASTOLIC BLOOD PRESSURE: 52 MMHG | WEIGHT: 193 LBS | BODY MASS INDEX: 27.63 KG/M2

## 2025-04-08 DIAGNOSIS — Z12.11 COLON CANCER SCREENING: Primary | ICD-10-CM

## 2025-04-08 DIAGNOSIS — M17.10 ARTHROPATHY OF KNEE: ICD-10-CM

## 2025-04-08 DIAGNOSIS — F11.20 CONTINUOUS OPIOID DEPENDENCE (HCC): ICD-10-CM

## 2025-04-08 DIAGNOSIS — E78.5 HYPERLIPIDEMIA, UNSPECIFIED HYPERLIPIDEMIA TYPE: ICD-10-CM

## 2025-04-08 DIAGNOSIS — G47.00 INSOMNIA, UNSPECIFIED TYPE: ICD-10-CM

## 2025-04-08 PROCEDURE — G2211 COMPLEX E/M VISIT ADD ON: HCPCS | Performed by: FAMILY MEDICINE

## 2025-04-08 PROCEDURE — 99214 OFFICE O/P EST MOD 30 MIN: CPT | Performed by: FAMILY MEDICINE

## 2025-04-08 RX ORDER — OXYCODONE AND ACETAMINOPHEN 5; 325 MG/1; MG/1
1 TABLET ORAL DAILY PRN
Qty: 10 TABLET | Refills: 0 | Status: SHIPPED | OUTPATIENT
Start: 2025-04-08

## 2025-04-08 RX ORDER — LISINOPRIL 40 MG/1
20 TABLET ORAL DAILY
Start: 2025-04-08

## 2025-04-08 RX ORDER — HYDROXYZINE HYDROCHLORIDE 10 MG/1
10 TABLET, FILM COATED ORAL
Qty: 30 TABLET | Refills: 0 | Status: SHIPPED | OUTPATIENT
Start: 2025-04-08 | End: 2025-04-14 | Stop reason: SDUPTHER

## 2025-04-08 NOTE — TELEPHONE ENCOUNTER
[facility] PT reports they are discharging patient from from home care services due to his goals being met. If patient would like to continue services, he is encouraged to get a referral for [facility] Fax:[phone]. Phone is [phone]. PT evaluate and treat secondary to balance impairment.

## 2025-04-09 PROBLEM — E66.01 MORBID (SEVERE) OBESITY DUE TO EXCESS CALORIES (HCC): Status: RESOLVED | Noted: 2019-03-19 | Resolved: 2025-04-09

## 2025-04-09 NOTE — ASSESSMENT & PLAN NOTE
PAPDMP reviewed and refilled for breakthrough symptoms only. Discussed supportive care and return parameters.   Orders:    oxyCODONE-acetaminophen (Percocet) 5-325 mg per tablet; Take 1 tablet by mouth daily as needed for severe pain Max Daily Amount: 1 tablet

## 2025-04-09 NOTE — ASSESSMENT & PLAN NOTE
PAPDMP reviewed and refilled for breakthrough symptoms only. Discussed supportive care and return parameters.

## 2025-04-09 NOTE — ASSESSMENT & PLAN NOTE
Add hydroxyzine PRN for insomnia or anxiety. Discussed supportive care and return parameters.   Orders:    hydrOXYzine HCL (ATARAX) 10 mg tablet; Take 1 tablet (10 mg total) by mouth daily at bedtime as needed (insomnia)

## 2025-04-09 NOTE — ASSESSMENT & PLAN NOTE
Continue meds. And decrease lisinopril due to hypoTN. Discussed supportive care and return parameters.   Orders:    lisinopril (ZESTRIL) 40 mg tablet; Take 0.5 tablets (20 mg total) by mouth daily

## 2025-04-09 NOTE — PROGRESS NOTES
Name: Abhinav Sierra      : 1950      MRN: [id number]  Encounter Provider: Alexys Daniels MD  Encounter Date: 2025   Encounter department: [facility]  :  Assessment & Plan  Colon cancer screening    Orders:    Ambulatory Referral to General Surgery; Future    Insomnia, unspecified type  Add hydroxyzine PRN for insomnia or anxiety. Discussed supportive care and return parameters.   Orders:    hydrOXYzine HCL (ATARAX) 10 mg tablet; Take 1 tablet (10 mg total) by mouth daily at bedtime as needed (insomnia)    Hyperlipidemia, unspecified hyperlipidemia type  Continue meds. And decrease lisinopril due to hypoTN. Discussed supportive care and return parameters.   Orders:    lisinopril (ZESTRIL) 40 mg tablet; Take 0.5 tablets (20 mg total) by mouth daily    Arthropathy of knee  PAPDMP reviewed and refilled for breakthrough symptoms only. Discussed supportive care and return parameters.   Orders:    oxyCODONE-acetaminophen (Percocet) 5-325 mg per tablet; Take 1 tablet by mouth daily as needed for severe pain Max Daily Amount: 1 tablet    Continuous opioid dependence (HCC)  PAPDMP reviewed and refilled for breakthrough symptoms only. Discussed supportive care and return parameters.               History of Present Illness   Patient is a 74 y/o male who presents for follow-up on insomnia, chronic knee arthropathy with chronic pain on opiates. No fevers chills nausea or vomiting continuing with dizziness.      Review of Systems   Constitutional: Negative.    HENT: Negative.     Eyes: Negative.    Respiratory: Negative.     Cardiovascular: Negative.    Gastrointestinal: Negative.    Endocrine: Negative.    Genitourinary: Negative.    Musculoskeletal:  Positive for arthralgias.   Allergic/Immunologic: Negative.    Neurological: Negative.    Hematological: Negative.    Psychiatric/Behavioral: Negative.     All other systems reviewed and are negative.      Objective   /52 (BP  "Location: Right arm, Patient Position: Sitting, Cuff Size: Standard)   Pulse 88   Temp 98.5 °F (36.9 °C) (Temporal)   Ht 5' 10\" (1.778 m)   Wt 87.5 kg (193 lb)   SpO2 98%   BMI 27.69 kg/m²      Physical Exam  Vitals reviewed.   Constitutional:       General: He is not in acute distress.     Appearance: He is well-developed. He is not diaphoretic.   HENT:      Head: Normocephalic and atraumatic.      Right Ear: External ear normal.      Left Ear: External ear normal.      Nose: Nose normal.   Eyes:      General: No scleral icterus.        Right eye: No discharge.         Left eye: No discharge.      Conjunctiva/sclera: Conjunctivae normal.      Pupils: Pupils are equal, round, and reactive to light.   Neck:      Thyroid: No thyromegaly.      Trachea: No tracheal deviation.   Cardiovascular:      Rate and Rhythm: Normal rate and regular rhythm.      Heart sounds: Normal heart sounds. No murmur heard.     No friction rub.   Pulmonary:      Effort: Pulmonary effort is normal. No respiratory distress.      Breath sounds: Normal breath sounds. No stridor. No wheezing or rales.   Abdominal:      General: There is no distension.      Palpations: Abdomen is soft. There is no mass.      Tenderness: There is no abdominal tenderness. There is no guarding or rebound.   Musculoskeletal:         General: Normal range of motion.      Cervical back: Normal range of motion and neck supple.   Lymphadenopathy:      Cervical: No cervical adenopathy.   Skin:     General: Skin is warm.   Neurological:      Mental Status: He is alert and oriented to person, place, and time.      Cranial Nerves: No cranial nerve deficit.   Psychiatric:         Behavior: Behavior normal.         Thought Content: Thought content normal.         Judgment: Judgment normal.         "

## 2025-04-10 ENCOUNTER — NURSE TRIAGE (OUTPATIENT)
Age: 75
End: 2025-04-10

## 2025-04-10 ENCOUNTER — TELEPHONE (OUTPATIENT)
Age: 75
End: 2025-04-10

## 2025-04-10 DIAGNOSIS — G47.00 INSOMNIA, UNSPECIFIED TYPE: ICD-10-CM

## 2025-04-10 DIAGNOSIS — F41.9 ANXIETY: Primary | ICD-10-CM

## 2025-04-10 NOTE — TELEPHONE ENCOUNTER
Patient BP today 106/48. He states his BP has been low and he is experiencing dizziness and lightheadedness with it. He is worried that his Lisinopril is causing his BP to be so low and is wondering if he should stop it to see if this increases his BP. He is nervous about falling as well due to the dizziness.     Other BP Readings:    4/9-102/44 116/49 105/44    Warm transfer to nurse line.

## 2025-04-10 NOTE — TELEPHONE ENCOUNTER
PA for hydrOXYzine HCl 10MG tablets SUBMITTED to [facility]    via    [x]CMM-KEY: [id number]  []Surescripts-Case ID #   []Availity-Auth ID # NDC #   []Faxed to plan   []Other website   []Phone call Case ID #     [x]PA sent as URGENT    All office notes, labs and other pertaining documents and studies sent. Clinical questions answered. Awaiting determination from insurance company.     Turnaround time for your insurance to make a decision on your Prior Authorization can take 7-21 business days.

## 2025-04-10 NOTE — TELEPHONE ENCOUNTER
"FOLLOW UP: Callback with PCP advice     REASON FOR CONVERSATION: Hypotension    SYMPTOMS: Dizziness, low BP    OTHER: Does not wish to go to the ED, would like a call back to advise if he should stop taking his BP medication since it is still dropping with 0.5 tabs.     DISPOSITION: Discuss With PCP and Callback by Nurse Today (overriding Go to ED/UCC Now (Or to Office with PCP Approval))      Reason for Disposition   Fall in systolic BP > 20 mm Hg from normal and feeling weak or lightheaded    Answer Assessment - Initial Assessment Questions  1. BLOOD PRESSURE: \"What is your blood pressure?\" \"Did you take at least two measurements 5 minutes apart?\"      106/48, 102/44, 116/49, 102/44  2. ONSET: \"When did you take your blood pressure?\"      1500 today, yesterday   3. HOW: \"How did you take your blood pressure?\" (e.g., visiting nurse, automatic home BP monitor)      Home BP monitor   4. HISTORY: \"Do you have a history of low blood pressure?\" \"What is your blood pressure normally?\"      Just recently  5. MEDICINES: \"Are you taking any medicines for blood pressure?\" If Yes, ask: \"Have they been changed recently?\"      Lisinopril, decreased   6. PULSE RATE: \"Do you know what your pulse rate is?\"       N/A  7. OTHER SYMPTOMS: \"Have you been sick recently?\" \"Have you had a recent injury?\"      Dizziness    Protocols used: Blood Pressure - Low-Adult-OH    "

## 2025-04-10 NOTE — TELEPHONE ENCOUNTER
Please review, Pt refuse going to ED, would like a call back to advise if he should stop taking his BP medication since it is still dropping witn  0.5 tabs. Please advise.     Thank you

## 2025-04-10 NOTE — TELEPHONE ENCOUNTER
"Insomnia is not an \"approved\" diagnosis for coverage. Is there another appropriate diagnosis we could use for the patient or do you wish to change the med?  "

## 2025-04-10 NOTE — TELEPHONE ENCOUNTER
PA for hydrOXYzine HCl 10MG tablets DENIED    Reason:        Message sent to office clinical pool Yes    Denial letter scanned into Media Yes    Appeal started No (Provider will need to decide if appeal is warranted and send clinical documentation to Prior Authorization Team for initiation.)    **Please follow up with your patient regarding denial and next steps**

## 2025-04-14 NOTE — TELEPHONE ENCOUNTER
"Med needs to be re-approved with the new dx for the PA team, although it appears the auth was already resubmitted. I have it pending and set to \"no print\". Please sign off on med.     Heidi - awaiting Dr Daniels to sign off on update.   "

## 2025-04-14 NOTE — TELEPHONE ENCOUNTER
PA for hydrOXYzine HCl 10MG tablets RESUBMITTED to [facility]    via    [x]CMM-KEY: [id number]  []Surescripts-Case ID #   []Availity-Auth ID # NDC #   []Faxed to plan   []Other website   []Phone call Case ID #     [x]PA sent as URGENT    All office notes, labs and other pertaining documents and studies sent. Clinical questions answered. Awaiting determination from insurance company.     Turnaround time for your insurance to make a decision on your Prior Authorization can take 7-21 business days.

## 2025-04-15 RX ORDER — HYDROXYZINE HYDROCHLORIDE 10 MG/1
10 TABLET, FILM COATED ORAL
Start: 2025-04-15 | End: 2025-04-18 | Stop reason: SDUPTHER

## 2025-04-17 ENCOUNTER — TELEPHONE (OUTPATIENT)
Age: 75
End: 2025-04-17

## 2025-04-17 DIAGNOSIS — R26.9 GAIT DISTURBANCE: Primary | ICD-10-CM

## 2025-04-17 DIAGNOSIS — H91.90 HEARING LOSS, UNSPECIFIED HEARING LOSS TYPE, UNSPECIFIED LATERALITY: ICD-10-CM

## 2025-04-17 DIAGNOSIS — R55 SYNCOPE AND COLLAPSE: ICD-10-CM

## 2025-04-17 DIAGNOSIS — R42 DIZZINESS: ICD-10-CM

## 2025-04-17 NOTE — TELEPHONE ENCOUNTER
Patient called and stated recently he had dropped down his dosage on his lisinopril to 20 mg. Recently patient has been having low blood pressure readings still and has since stopped the lisinopril. Patient stated the other day he had a blood pressure reading of 130/66. Patient stated all other readings were within normal range. At this time patient is asking if pcp would recommend staying off of his blood pressure medication at this time. Patient stated if pcp does recommend this is there a range patient should be looking out for, for when it may need to be time to restart the medication. Please advise and return patient call to discuss.    Patient also stated he had recently seen his ENT and his physician is recommending him to have an MRI of his brain. Patient stated he was diagnosed with hearing loss however patients ENT suggested the MRI due to dizziness as well as the hearing loss to see if the dizziness may be from another cause. Patient is asking if pcp can please place the order for the MRI. If MRI can be placed please return patient call to notify. Patient stated he has an appointment on 04/23/2025 to see pcp however if MRI order is placed patient will be having this done next week and will need to reschedule pcp visit. Please advise.

## 2025-04-17 NOTE — TELEPHONE ENCOUNTER
PA for hydrOXYzine HCl 10MG tablets APPROVED     Date(s) approved 04/10/2025 until further notice    Case #[id number]    Patient advised by          [x]TopLoghart Message  []Phone call   []LMOM  []L/M to call office as no active Communication consent on file  [x]Unable to leave detailed message as VM not approved on Communication consent       Pharmacy advised by    [x]Fax  []Phone call  []Secure Chat    Approval letter scanned into Media Yes

## 2025-04-18 ENCOUNTER — TELEPHONE (OUTPATIENT)
Dept: LAB | Facility: HOSPITAL | Age: 75
End: 2025-04-18

## 2025-04-18 ENCOUNTER — PATIENT OUTREACH (OUTPATIENT)
Dept: CASE MANAGEMENT | Facility: OTHER | Age: 75
End: 2025-04-18

## 2025-04-18 DIAGNOSIS — Z78.9 NEED FOR FOLLOW-UP BY SOCIAL WORKER: Primary | ICD-10-CM

## 2025-04-18 DIAGNOSIS — F41.9 ANXIETY: ICD-10-CM

## 2025-04-18 NOTE — TELEPHONE ENCOUNTER
Patient is requesting a 90 day supply.   Reason for call:   [x] Refill   [] Prior Auth  [] Other:     Office:   [x] PCP/Provider - [facility]  Authorized By: Alexys Daniels MD    [] Specialty/Provider -     Medication: hydrOXYzine HCL (ATARAX) 10 mg tablet    Dose/Frequency: Take 1 tablet (10 mg total) by mouth daily at bedtime as needed for anxiety    Quantity: 90     Pharmacy: [id number] - [place] - [address] [facility] [facility]   Does the patient have enough for 3 days?   [x] Yes   [] No - Send as HP to POD    Mail Away Pharmacy   Does the patient have enough for 10 days?   [] Yes   [] No - Send as HP to POD

## 2025-04-18 NOTE — PROGRESS NOTES
"Spoke with Abhinav PT has stopped they did all they could do he said. Continues with dizziness. Did see ENT yesterday and will have MRI done on 5/8/25. Provided him phone number for [facility] lab now  \"Its hard for me to get around and move my legs\"  Abhinav keeps track of blood pressure readings. Lisinopril is still on hold  I will check back in one week   "

## 2025-04-20 RX ORDER — HYDROXYZINE HYDROCHLORIDE 10 MG/1
10 TABLET, FILM COATED ORAL
Qty: 90 TABLET | Refills: 1 | Status: SHIPPED | OUTPATIENT
Start: 2025-04-20

## 2025-04-21 ENCOUNTER — PATIENT OUTREACH (OUTPATIENT)
Dept: CASE MANAGEMENT | Facility: OTHER | Age: 75
End: 2025-04-21

## 2025-04-21 NOTE — PROGRESS NOTES
OP CM rcvd referral for waiver.  Called to pt and he states he received mail about getting help at home.   Pt states he would like help cleaning.  Pt resides alone.  Pt states his brother drives him to appts.  Pt states he has neuropathy.  Pt uses a roller walker for ambulation.  Pt does bathe himself.  Pt was recently in [facility] re[facility].  Discussed home health waiver but pt is over income.  Pt is not a  so he is not eligible for Vet Assist.  Pt states his son is local but works all the time.  Pt states his neighbor helps him with the laundry.  Pt given info for private pay home health aides.  Pt states his son also has a cleaning lady so he will also speak to son and get the information from his son on his cleaning lady.

## 2025-04-25 ENCOUNTER — PATIENT OUTREACH (OUTPATIENT)
Dept: CASE MANAGEMENT | Facility: OTHER | Age: 75
End: 2025-04-25

## 2025-04-25 NOTE — PROGRESS NOTES
Abhinav returned my phone call. MRI cancelled because he is concerned he will have to urinate during test.  Blood pressure running 120's-130 over 60's.  Will ENT on 5/6/25.  Urinates frequently during the night. Did not have this issue when he was taking gabapentin   He is established with urology.   Encouraged Abhinav to follow up with urinary issues with urology or PCP.  I will check back after ENT appointment.

## 2025-04-30 ENCOUNTER — TELEPHONE (OUTPATIENT)
Age: 75
End: 2025-04-30

## 2025-04-30 NOTE — TELEPHONE ENCOUNTER
Patient calling due to his BP concerns. The last few BP reading were as follows: 113/56, 130/68, 131/64, 129/60, 129/64, 131/63, 134/61, 128/65 this am. He us wondering if he should be taking half a tablet every other day of his Lisinopril. Patient asking for advice.

## 2025-05-01 NOTE — TELEPHONE ENCOUNTER
Alexys Daniels MD to [facility]  3 minutes ago  Pressure is ok off of it, but he should continue to monitor pressures, and restart if pressures increasing > 140/90.     Patient was contacted and was advised of the above that he can stay of the lisinopril but if his blood pressure starts to rise above 140/90 he needs to restart the lisinopril

## 2025-05-01 NOTE — TELEPHONE ENCOUNTER
Patient aware. He states he has not been taking any lisinopril due to Dr. Daniels orders. Asking if this is okay or if he should restart medication.

## 2025-05-06 ENCOUNTER — OFFICE VISIT (OUTPATIENT)
Dept: AUDIOLOGY | Age: 75
End: 2025-05-06
Attending: PHYSICIAN ASSISTANT
Payer: MEDICARE

## 2025-05-06 DIAGNOSIS — H81.8X1 RIGHT-SIDED VESTIBULAR WEAKNESS: Primary | ICD-10-CM

## 2025-05-06 DIAGNOSIS — R42 DIZZINESS: ICD-10-CM

## 2025-05-06 PROCEDURE — 92540 BASIC VESTIBULAR EVALUATION: CPT | Performed by: AUDIOLOGIST

## 2025-05-06 PROCEDURE — 92537 CALORIC VSTBLR TEST W/REC: CPT | Performed by: AUDIOLOGIST

## 2025-05-06 PROCEDURE — 92567 TYMPANOMETRY: CPT | Performed by: AUDIOLOGIST

## 2025-05-06 NOTE — PROGRESS NOTES
Videonystagmography (VNG) Evaluation    Name:  Abhinav Sierra  :  1950  Age:  75 y.o.  MRN:  [id number]  Date of Evaluation: 25     HISTORY:     Reason for visit: Dizziness    Abhinav Sierra is seen today at the request of David Nicholson PA-C for VNG testing. Abhinav was unaccompanied to today's visit. Today, Abhinav reported that onset of symptoms began approximately 3 months ago when he suffered two falls. The current symptoms are described as persistent in frequency. Dizziness perception is described as a(n)  unsteady and lightheaded  sensation.   Episode triggers include getting into bed and reaching to the left or right. Symptom duration was noted to typically last seconds to minutes before subsiding. Episode frequency occurs on a daily basis. Abhinav was initially treated for BPPV which resolved his vertiginous symptoms but he continues to experience lingering imbalance. He also suffers from neuropathy in his lower extremities.  EVALUATION:    Otoscopic Evaluation:   Right Ear: Unremarkable, canal clear   Left Ear: Unremarkable, canal clear    Tympanometry:   Right: Type A; normal middle ear pressure and static compliance    Left: Type Ad; normal middle ear pressure with increased static compliance, consistent with a hypermobile tympanic membrane     Oculomotor battery:   Gaze:    Center:  2 degrees leftbeating nystagmus,suppressed with fixation  Right:  1 degree leftbeating nystagmus, suppressed with fixation  Left:  3 degrees leftbeating nystagmus, suppressed with fixation  Up:  3 degrees leftbeating nystagmus, suppressed with fixation  Down:  4 degrees leftbeating nystagmus, suppressed with fixation      Tracking: Abnormal: Gain at 0.3 Hz through 0.5 Hz     Saccades: Within normal limits     Optokinetic: Within normal limits    Positioning/Positionals:     Hong Justin Talcott:    Right:  Negative. 14 degrees leftbeating nystagmus. No reported dizziness.    Left:  Negative. 8 degrees leftbeating  nystagmus. No reported dizziness.        Positionals:   Sitting: Abnormal Findings: Left beating nystagmus: 2°, which is clinically significant, suppressed with fixation    Supine: Abnormal Findings: Left beating nystagmus: 8°, which is clinically significant, suppressed with fixation    Head Right:Abnormal Findings: Left beating nystagmus: 5°, which is clinically significant, suppressed with fixation    Head Left: Abnormal Findings: Left beating nystagmus: 3°, which is clinically significant, suppressed with fixation    Body Right:  Patient declined    Body Left:  Patient declined    30 degrees Supine: Abnormal Findings: Left beating nystagmus: 3°, which is clinically significant, suppressed with fixation        Calorics: (Normal response <25% difference)    Bithermal Caloric Irrigation: Abnormal findings: Unilateral Weakness: Right 80%     Caloric irrigations  completed without incident with good parting otoscopy noted.       IMPRESSIONS:     Abnormal: Peripheral:  80% right unilateral weakness    No significant central findings    RECOMMENDATIONS:     1) Follow-up with referring provider to review today's results.  2) Continue to monitor dizziness symptoms. If symptoms worsen or fail to improve prior to follow-up with their referring provider, contact your primary care/or referring provider and/or urgent medical attention should be considered.  3) Fall precautions were discussed at length with the patient. Most test effects are expected to subside shortly after testing is completed, it was recommended that they use caution moving around for the remainder of the day.   4) Consider vestibular physical therapy evaluation and rehabilitation through [facility]      Zeke Nielson, CCC-A  Clinical Audiologist  [facility]  [address] [id number]

## 2025-05-08 ENCOUNTER — PATIENT OUTREACH (OUTPATIENT)
Dept: CASE MANAGEMENT | Facility: OTHER | Age: 75
End: 2025-05-08

## 2025-05-08 NOTE — PROGRESS NOTES
Abhinav returned my phone call. He had his videonystagmography done and is now waiting to hear from ENT about treatment   Abhinav talked about the test and was so happy with the women who performed it.   Blood pressures ranging 126-134 over 57-67. Still not taking lisinopril knows to call PCP if blood pressure goes 140/90  I will check back next week to see if ENT has reached out to Abhinav

## 2025-05-13 ENCOUNTER — PATIENT OUTREACH (OUTPATIENT)
Dept: CASE MANAGEMENT | Facility: OTHER | Age: 75
End: 2025-05-13

## 2025-05-13 NOTE — PROGRESS NOTES
OP CM rcvd referral for pt for transportation.   Pt resides alone. Pt states his brother drives him to appts.  Pt states he has heard bad things about [facility] and does not want to use it.  Pt is aware he would have to private pay for transportation if he does not want to enroll in [facility].  Pt states he has an appt on Thursday May 29th at [address] [place] [address] OP therapy.  Pt states if he has to he will pay for an Uber.      Pt states he has needs vestibular therapy but may have difficulty getting to OP PT.  Called to Jose Raul at [facility] and she states that they can assist pt in the home if pt needs.  Pt would need Amb referral for PT for vestibular issues faxed to [facility] at fax [phone].  Pt is aware this could be an option if he cannot make it to an OP office.      Pt states he has neuropathy. Pt uses a roller walker for ambulation. Pt does bathe himself. Pt was recently in [facility] for rehab. Discussed home health waiver but pt is over income. Pt is not a  so he is not eligible for [employer]. Pt states his son is local but works all the time. Pt states his neighbor helps him with the laundry. Pt given info for private pay home health aides. Pt states his son also has a cleaning lady so he will also speak to son and get the information from his son on his cleaning lady.

## 2025-05-15 ENCOUNTER — TELEPHONE (OUTPATIENT)
Dept: CASE MANAGEMENT | Facility: OTHER | Age: 75
End: 2025-05-15

## 2025-05-15 NOTE — TELEPHONE ENCOUNTER
OP CM rcvd message from PCP office that pt would now like home PT.  Message sent back to PCP office that O PCM spoke to Jose Raul at [facility] and they can accommodate pt and tt would need Amb referral for PT for vestibular issues [facility] [phone].

## 2025-05-16 ENCOUNTER — PATIENT OUTREACH (OUTPATIENT)
Dept: CASE MANAGEMENT | Facility: OTHER | Age: 75
End: 2025-05-16

## 2025-05-16 NOTE — PROGRESS NOTES
Spoke with Abhinav. [facility] is [facility] about [facility]. States he has peripheral vertigo of right ear.  Abhinav's goal is to drive again.   Blood pressure ranging 121-125 over 57-61. Still not taking blood pressure medications. Plans to follow up with PCP after his therapy   Will close from care management at this time.

## 2025-05-28 ENCOUNTER — PATIENT OUTREACH (OUTPATIENT)
Dept: CASE MANAGEMENT | Facility: OTHER | Age: 75
End: 2025-05-28

## 2025-05-28 NOTE — PROGRESS NOTES
Spoke with Abhinav [facility] was out yesterday to start vestibular therapy. They will be backout tomorrow.   He has been dizzy since the session yesterday. Using his Rolator walker so he does not fall. He will ask PT if that is to be expected after he has session. His goal is to drive again.   No other questions at this time

## 2025-06-09 DIAGNOSIS — K21.9 GASTROESOPHAGEAL REFLUX DISEASE WITHOUT ESOPHAGITIS: ICD-10-CM

## 2025-06-09 RX ORDER — PANTOPRAZOLE SODIUM 40 MG/1
TABLET, DELAYED RELEASE ORAL
Qty: 90 TABLET | Refills: 0 | Status: SHIPPED | OUTPATIENT
Start: 2025-06-09

## 2025-06-23 ENCOUNTER — TELEPHONE (OUTPATIENT)
Age: 75
End: 2025-06-23

## 2025-06-23 DIAGNOSIS — Z12.11 COLON CANCER SCREENING: Primary | ICD-10-CM

## 2025-06-23 NOTE — TELEPHONE ENCOUNTER
Pt returned call relayed the chart note he do not understand what is all about. He keep saying can they send home cologuard which he had previously. Tried to warm transfer the call went unanswered. Kindly reach out to patient to explain the same. Thanks

## 2025-06-23 NOTE — TELEPHONE ENCOUNTER
Patient said he keeps getting a message on his MyChart to make an appointment with the general surgeon. He has no knowledge of why he is supposed to call him. Please call patient. He is very confused.

## 2025-06-23 NOTE — TELEPHONE ENCOUNTER
L/m for patient to contact office, the referral for general surgery is for his colorectal screening (colonoscopy) as they can probably get him in sooner for this procedure.

## 2025-06-25 ENCOUNTER — TELEPHONE (OUTPATIENT)
Age: 75
End: 2025-06-25

## 2025-06-25 ENCOUNTER — DOCUMENTATION (OUTPATIENT)
Age: 75
End: 2025-06-25

## 2025-06-25 NOTE — PROGRESS NOTES
06/26/25 - Form was completed, faxed and scanned into patients' chart today.    Processing, will completed tomorrow 06/26/25.

## 2025-06-25 NOTE — TELEPHONE ENCOUNTER
Patient called in stating that he is having someone refax the forms for Primary Care Provider to fill out  it could take up to three days before we receive it patient had no further questions

## 2025-06-25 NOTE — TELEPHONE ENCOUNTER
Spoke with patient to attempt to figure out what it is that he needs and the patient is unsure. Provided him with the office fax number and told him to call whoever he was speaking with before and to have them fax any paperwork to us.

## 2025-06-25 NOTE — TELEPHONE ENCOUNTER
Patient asking for the office to call the waiver program due to them sending forms and the office not receiving them . He states he is looking for help around his house.

## 2025-08-19 ENCOUNTER — OFFICE VISIT (OUTPATIENT)
Age: 75
End: 2025-08-19
Payer: MEDICARE

## 2025-08-19 VITALS
OXYGEN SATURATION: 100 % | TEMPERATURE: 99.5 F | DIASTOLIC BLOOD PRESSURE: 70 MMHG | WEIGHT: 200 LBS | SYSTOLIC BLOOD PRESSURE: 138 MMHG | HEIGHT: 70 IN | RESPIRATION RATE: 16 BRPM | BODY MASS INDEX: 28.63 KG/M2 | HEART RATE: 71 BPM

## 2025-08-19 DIAGNOSIS — M17.10 ARTHROPATHY OF KNEE: ICD-10-CM

## 2025-08-19 DIAGNOSIS — R73.9 HYPERGLYCEMIA: ICD-10-CM

## 2025-08-19 DIAGNOSIS — E78.2 MIXED HYPERLIPIDEMIA: ICD-10-CM

## 2025-08-19 DIAGNOSIS — E78.5 HYPERLIPIDEMIA, UNSPECIFIED HYPERLIPIDEMIA TYPE: ICD-10-CM

## 2025-08-19 DIAGNOSIS — I10 BENIGN ESSENTIAL HYPERTENSION: ICD-10-CM

## 2025-08-19 DIAGNOSIS — Z12.5 ENCOUNTER FOR SCREENING FOR MALIGNANT NEOPLASM OF PROSTATE: ICD-10-CM

## 2025-08-19 DIAGNOSIS — I25.10 CORONARY ARTERY DISEASE INVOLVING NATIVE CORONARY ARTERY OF NATIVE HEART WITHOUT ANGINA PECTORIS: ICD-10-CM

## 2025-08-19 DIAGNOSIS — R35.89 POLYURIA: ICD-10-CM

## 2025-08-19 DIAGNOSIS — R73.03 PRE-DIABETES: Primary | ICD-10-CM

## 2025-08-19 PROCEDURE — 99214 OFFICE O/P EST MOD 30 MIN: CPT | Performed by: FAMILY MEDICINE

## 2025-08-19 PROCEDURE — G2211 COMPLEX E/M VISIT ADD ON: HCPCS | Performed by: FAMILY MEDICINE

## 2025-08-19 RX ORDER — ATORVASTATIN CALCIUM 40 MG/1
40 TABLET, FILM COATED ORAL DAILY
Qty: 90 TABLET | Refills: 1 | Status: SHIPPED | OUTPATIENT
Start: 2025-08-19

## 2025-08-19 RX ORDER — OXYCODONE AND ACETAMINOPHEN 5; 325 MG/1; MG/1
1 TABLET ORAL DAILY PRN
Qty: 60 TABLET | Refills: 0 | Status: SHIPPED | OUTPATIENT
Start: 2025-08-19